# Patient Record
Sex: MALE | Race: WHITE | NOT HISPANIC OR LATINO | Employment: OTHER | URBAN - METROPOLITAN AREA
[De-identification: names, ages, dates, MRNs, and addresses within clinical notes are randomized per-mention and may not be internally consistent; named-entity substitution may affect disease eponyms.]

---

## 2019-03-19 ENCOUNTER — APPOINTMENT (OUTPATIENT)
Dept: GENERAL RADIOLOGY | Facility: CLINIC | Age: 64
DRG: 871 | End: 2019-03-19
Attending: INTERNAL MEDICINE
Payer: COMMERCIAL

## 2019-03-19 ENCOUNTER — APPOINTMENT (OUTPATIENT)
Dept: ULTRASOUND IMAGING | Facility: CLINIC | Age: 64
DRG: 871 | End: 2019-03-19
Attending: EMERGENCY MEDICINE
Payer: COMMERCIAL

## 2019-03-19 ENCOUNTER — APPOINTMENT (OUTPATIENT)
Dept: ULTRASOUND IMAGING | Facility: CLINIC | Age: 64
DRG: 871 | End: 2019-03-19
Attending: INTERNAL MEDICINE
Payer: COMMERCIAL

## 2019-03-19 ENCOUNTER — APPOINTMENT (OUTPATIENT)
Dept: CT IMAGING | Facility: CLINIC | Age: 64
DRG: 871 | End: 2019-03-19
Payer: COMMERCIAL

## 2019-03-19 ENCOUNTER — HOSPITAL ENCOUNTER (INPATIENT)
Facility: CLINIC | Age: 64
LOS: 9 days | Discharge: HOME | DRG: 871 | End: 2019-03-28
Attending: EMERGENCY MEDICINE | Admitting: INTERNAL MEDICINE
Payer: COMMERCIAL

## 2019-03-19 DIAGNOSIS — R52 PAIN: Primary | ICD-10-CM

## 2019-03-19 DIAGNOSIS — N28.9 ACUTE RENAL INSUFFICIENCY: ICD-10-CM

## 2019-03-19 DIAGNOSIS — I48.92 ATRIAL FLUTTER WITH RAPID VENTRICULAR RESPONSE (H): ICD-10-CM

## 2019-03-19 DIAGNOSIS — A41.9 SEPSIS, DUE TO UNSPECIFIED ORGANISM: ICD-10-CM

## 2019-03-19 DIAGNOSIS — R10.13 ABDOMINAL PAIN, EPIGASTRIC: ICD-10-CM

## 2019-03-19 DIAGNOSIS — K21.00 GASTROESOPHAGEAL REFLUX DISEASE WITH ESOPHAGITIS: ICD-10-CM

## 2019-03-19 DIAGNOSIS — R11.2 NAUSEA AND VOMITING, INTRACTABILITY OF VOMITING NOT SPECIFIED, UNSPECIFIED VOMITING TYPE: ICD-10-CM

## 2019-03-19 LAB
ALBUMIN SERPL-MCNC: 3.1 G/DL (ref 3.4–5)
ALP SERPL-CCNC: 73 U/L (ref 40–150)
ALT SERPL W P-5'-P-CCNC: 27 U/L (ref 0–70)
ANION GAP SERPL CALCULATED.3IONS-SCNC: 12 MMOL/L (ref 3–14)
AST SERPL W P-5'-P-CCNC: 18 U/L (ref 0–45)
BASOPHILS # BLD AUTO: 0 10E9/L (ref 0–0.2)
BASOPHILS NFR BLD AUTO: 0 %
BILIRUB SERPL-MCNC: 2.5 MG/DL (ref 0.2–1.3)
BUN SERPL-MCNC: 34 MG/DL (ref 7–30)
CALCIUM SERPL-MCNC: 9.2 MG/DL (ref 8.5–10.1)
CHLORIDE SERPL-SCNC: 102 MMOL/L (ref 94–109)
CO2 BLDCOV-SCNC: 24 MMOL/L (ref 21–28)
CO2 SERPL-SCNC: 23 MMOL/L (ref 20–32)
CREAT SERPL-MCNC: 1.89 MG/DL (ref 0.66–1.25)
D DIMER PPP FEU-MCNC: 1.4 UG/ML FEU (ref 0–0.5)
DIFFERENTIAL METHOD BLD: ABNORMAL
EOSINOPHIL # BLD AUTO: 0 10E9/L (ref 0–0.7)
EOSINOPHIL NFR BLD AUTO: 0 %
ERYTHROCYTE [DISTWIDTH] IN BLOOD BY AUTOMATED COUNT: 13.4 % (ref 10–15)
GFR SERPL CREATININE-BSD FRML MDRD: 36 ML/MIN/{1.73_M2}
GLUCOSE SERPL-MCNC: 123 MG/DL (ref 70–99)
HCT VFR BLD AUTO: 46 % (ref 40–53)
HGB BLD-MCNC: 13.9 G/DL (ref 13.3–17.7)
HGB BLD-MCNC: 14.8 G/DL (ref 13.3–17.7)
HGB BLD-MCNC: 15.8 G/DL (ref 13.3–17.7)
IMM GRANULOCYTES # BLD: 0.2 10E9/L (ref 0–0.4)
IMM GRANULOCYTES NFR BLD: 1.1 %
INTERPRETATION ECG - MUSE: NORMAL
LACTATE BLD-SCNC: 2.6 MMOL/L (ref 0.7–2.1)
LACTATE SERPL-SCNC: 2.2 MMOL/L (ref 0.4–2)
LACTATE SERPL-SCNC: 2.7 MMOL/L (ref 0.4–2)
LIPASE SERPL-CCNC: 332 U/L (ref 73–393)
LYMPHOCYTES # BLD AUTO: 0.5 10E9/L (ref 0.8–5.3)
LYMPHOCYTES NFR BLD AUTO: 2.6 %
MCH RBC QN AUTO: 30.3 PG (ref 26.5–33)
MCHC RBC AUTO-ENTMCNC: 34.3 G/DL (ref 31.5–36.5)
MCV RBC AUTO: 88 FL (ref 78–100)
MONOCYTES # BLD AUTO: 0.7 10E9/L (ref 0–1.3)
MONOCYTES NFR BLD AUTO: 3.4 %
NEUTROPHILS # BLD AUTO: 18.8 10E9/L (ref 1.6–8.3)
NEUTROPHILS NFR BLD AUTO: 92.9 %
NRBC # BLD AUTO: 0 10*3/UL
NRBC BLD AUTO-RTO: 0 /100
PCO2 BLDV: 42 MM HG (ref 40–50)
PH BLDV: 7.36 PH (ref 7.32–7.43)
PLATELET # BLD AUTO: 152 10E9/L (ref 150–450)
PO2 BLDV: 19 MM HG (ref 25–47)
POTASSIUM SERPL-SCNC: 4 MMOL/L (ref 3.4–5.3)
PROCALCITONIN SERPL-MCNC: 23 NG/ML
PROT SERPL-MCNC: 7.9 G/DL (ref 6.8–8.8)
RBC # BLD AUTO: 5.22 10E12/L (ref 4.4–5.9)
SAO2 % BLDV FROM PO2: 27 %
SODIUM SERPL-SCNC: 137 MMOL/L (ref 133–144)
TROPONIN I SERPL-MCNC: <0.015 UG/L (ref 0–0.04)
WBC # BLD AUTO: 20.3 10E9/L (ref 4–11)

## 2019-03-19 PROCEDURE — 87040 BLOOD CULTURE FOR BACTERIA: CPT | Performed by: PHYSICIAN ASSISTANT

## 2019-03-19 PROCEDURE — 83690 ASSAY OF LIPASE: CPT | Performed by: PHYSICIAN ASSISTANT

## 2019-03-19 PROCEDURE — 25000128 H RX IP 250 OP 636: Performed by: PHYSICIAN ASSISTANT

## 2019-03-19 PROCEDURE — 84484 ASSAY OF TROPONIN QUANT: CPT | Performed by: PHYSICIAN ASSISTANT

## 2019-03-19 PROCEDURE — 36415 COLL VENOUS BLD VENIPUNCTURE: CPT | Performed by: INTERNAL MEDICINE

## 2019-03-19 PROCEDURE — C9113 INJ PANTOPRAZOLE SODIUM, VIA: HCPCS | Performed by: INTERNAL MEDICINE

## 2019-03-19 PROCEDURE — 96365 THER/PROPH/DIAG IV INF INIT: CPT | Mod: 59

## 2019-03-19 PROCEDURE — 25800030 ZZH RX IP 258 OP 636: Performed by: PHYSICIAN ASSISTANT

## 2019-03-19 PROCEDURE — 85025 COMPLETE CBC W/AUTO DIFF WBC: CPT | Performed by: PHYSICIAN ASSISTANT

## 2019-03-19 PROCEDURE — 93970 EXTREMITY STUDY: CPT

## 2019-03-19 PROCEDURE — 25000132 ZZH RX MED GY IP 250 OP 250 PS 637: Performed by: EMERGENCY MEDICINE

## 2019-03-19 PROCEDURE — 25800030 ZZH RX IP 258 OP 636: Performed by: INTERNAL MEDICINE

## 2019-03-19 PROCEDURE — 25000128 H RX IP 250 OP 636: Performed by: EMERGENCY MEDICINE

## 2019-03-19 PROCEDURE — 96361 HYDRATE IV INFUSION ADD-ON: CPT

## 2019-03-19 PROCEDURE — 85018 HEMOGLOBIN: CPT | Performed by: PHYSICIAN ASSISTANT

## 2019-03-19 PROCEDURE — 85379 FIBRIN DEGRADATION QUANT: CPT | Performed by: PHYSICIAN ASSISTANT

## 2019-03-19 PROCEDURE — 25000132 ZZH RX MED GY IP 250 OP 250 PS 637: Performed by: PHYSICIAN ASSISTANT

## 2019-03-19 PROCEDURE — 80053 COMPREHEN METABOLIC PANEL: CPT | Performed by: PHYSICIAN ASSISTANT

## 2019-03-19 PROCEDURE — 96375 TX/PRO/DX INJ NEW DRUG ADDON: CPT

## 2019-03-19 PROCEDURE — 99285 EMERGENCY DEPT VISIT HI MDM: CPT | Mod: 25

## 2019-03-19 PROCEDURE — 25000128 H RX IP 250 OP 636: Performed by: FAMILY MEDICINE

## 2019-03-19 PROCEDURE — 82803 BLOOD GASES ANY COMBINATION: CPT

## 2019-03-19 PROCEDURE — 76705 ECHO EXAM OF ABDOMEN: CPT

## 2019-03-19 PROCEDURE — 71046 X-RAY EXAM CHEST 2 VIEWS: CPT

## 2019-03-19 PROCEDURE — 74177 CT ABD & PELVIS W/CONTRAST: CPT

## 2019-03-19 PROCEDURE — 12000000 ZZH R&B MED SURG/OB

## 2019-03-19 PROCEDURE — C9113 INJ PANTOPRAZOLE SODIUM, VIA: HCPCS | Performed by: EMERGENCY MEDICINE

## 2019-03-19 PROCEDURE — 93005 ELECTROCARDIOGRAM TRACING: CPT

## 2019-03-19 PROCEDURE — 25000125 ZZHC RX 250: Performed by: FAMILY MEDICINE

## 2019-03-19 PROCEDURE — 83605 ASSAY OF LACTIC ACID: CPT

## 2019-03-19 PROCEDURE — A9270 NON-COVERED ITEM OR SERVICE: HCPCS | Performed by: EMERGENCY MEDICINE

## 2019-03-19 PROCEDURE — 25000128 H RX IP 250 OP 636: Performed by: INTERNAL MEDICINE

## 2019-03-19 PROCEDURE — 83605 ASSAY OF LACTIC ACID: CPT | Performed by: PHYSICIAN ASSISTANT

## 2019-03-19 PROCEDURE — 84145 PROCALCITONIN (PCT): CPT | Performed by: PHYSICIAN ASSISTANT

## 2019-03-19 PROCEDURE — 99223 1ST HOSP IP/OBS HIGH 75: CPT | Mod: AI | Performed by: INTERNAL MEDICINE

## 2019-03-19 PROCEDURE — 85018 HEMOGLOBIN: CPT | Performed by: INTERNAL MEDICINE

## 2019-03-19 RX ORDER — POTASSIUM CL/LIDO/0.9 % NACL 10MEQ/0.1L
10 INTRAVENOUS SOLUTION, PIGGYBACK (ML) INTRAVENOUS
Status: DISCONTINUED | OUTPATIENT
Start: 2019-03-19 | End: 2019-03-22

## 2019-03-19 RX ORDER — POLYETHYLENE GLYCOL 3350 17 G/17G
17 POWDER, FOR SOLUTION ORAL DAILY PRN
Status: DISCONTINUED | OUTPATIENT
Start: 2019-03-19 | End: 2019-03-28 | Stop reason: HOSPADM

## 2019-03-19 RX ORDER — POTASSIUM CHLORIDE 29.8 MG/ML
20 INJECTION INTRAVENOUS
Status: DISCONTINUED | OUTPATIENT
Start: 2019-03-19 | End: 2019-03-22

## 2019-03-19 RX ORDER — ONDANSETRON 2 MG/ML
4 INJECTION INTRAMUSCULAR; INTRAVENOUS EVERY 6 HOURS PRN
Status: DISCONTINUED | OUTPATIENT
Start: 2019-03-19 | End: 2019-03-28 | Stop reason: HOSPADM

## 2019-03-19 RX ORDER — METOCLOPRAMIDE HYDROCHLORIDE 5 MG/ML
5 INJECTION INTRAMUSCULAR; INTRAVENOUS EVERY 6 HOURS PRN
Status: DISCONTINUED | OUTPATIENT
Start: 2019-03-19 | End: 2019-03-27

## 2019-03-19 RX ORDER — LIDOCAINE 40 MG/G
CREAM TOPICAL
Status: DISCONTINUED | OUTPATIENT
Start: 2019-03-19 | End: 2019-03-19

## 2019-03-19 RX ORDER — PROCHLORPERAZINE MALEATE 5 MG
5 TABLET ORAL EVERY 6 HOURS PRN
Status: DISCONTINUED | OUTPATIENT
Start: 2019-03-19 | End: 2019-03-28 | Stop reason: HOSPADM

## 2019-03-19 RX ORDER — BISACODYL 5 MG
15 TABLET, DELAYED RELEASE (ENTERIC COATED) ORAL DAILY PRN
Status: DISCONTINUED | OUTPATIENT
Start: 2019-03-19 | End: 2019-03-27

## 2019-03-19 RX ORDER — IOPAMIDOL 755 MG/ML
95 INJECTION, SOLUTION INTRAVASCULAR ONCE
Status: COMPLETED | OUTPATIENT
Start: 2019-03-19 | End: 2019-03-19

## 2019-03-19 RX ORDER — ACETAMINOPHEN 650 MG/1
650 SUPPOSITORY RECTAL EVERY 4 HOURS PRN
Status: DISCONTINUED | OUTPATIENT
Start: 2019-03-19 | End: 2019-03-28 | Stop reason: HOSPADM

## 2019-03-19 RX ORDER — POTASSIUM CHLORIDE 1.5 G/1.58G
20-40 POWDER, FOR SOLUTION ORAL
Status: DISCONTINUED | OUTPATIENT
Start: 2019-03-19 | End: 2019-03-22

## 2019-03-19 RX ORDER — POTASSIUM CHLORIDE 7.45 MG/ML
10 INJECTION INTRAVENOUS
Status: DISCONTINUED | OUTPATIENT
Start: 2019-03-19 | End: 2019-03-22

## 2019-03-19 RX ORDER — ONDANSETRON 2 MG/ML
4 INJECTION INTRAMUSCULAR; INTRAVENOUS ONCE
Status: COMPLETED | OUTPATIENT
Start: 2019-03-19 | End: 2019-03-19

## 2019-03-19 RX ORDER — AMOXICILLIN 250 MG
1 CAPSULE ORAL 2 TIMES DAILY
Status: DISCONTINUED | OUTPATIENT
Start: 2019-03-19 | End: 2019-03-20

## 2019-03-19 RX ORDER — ACETAMINOPHEN 325 MG/1
650 TABLET ORAL EVERY 4 HOURS PRN
Status: DISCONTINUED | OUTPATIENT
Start: 2019-03-19 | End: 2019-03-28 | Stop reason: HOSPADM

## 2019-03-19 RX ORDER — HYDROMORPHONE HYDROCHLORIDE 1 MG/ML
.3-.5 INJECTION, SOLUTION INTRAMUSCULAR; INTRAVENOUS; SUBCUTANEOUS
Status: DISCONTINUED | OUTPATIENT
Start: 2019-03-19 | End: 2019-03-23

## 2019-03-19 RX ORDER — METOCLOPRAMIDE 5 MG/1
5 TABLET ORAL EVERY 6 HOURS PRN
Status: DISCONTINUED | OUTPATIENT
Start: 2019-03-19 | End: 2019-03-27

## 2019-03-19 RX ORDER — NITROGLYCERIN 0.4 MG/1
0.4 TABLET SUBLINGUAL EVERY 5 MIN PRN
Status: DISCONTINUED | OUTPATIENT
Start: 2019-03-19 | End: 2019-03-28 | Stop reason: HOSPADM

## 2019-03-19 RX ORDER — ACETAMINOPHEN 500 MG
1000 TABLET ORAL ONCE
Status: COMPLETED | OUTPATIENT
Start: 2019-03-19 | End: 2019-03-19

## 2019-03-19 RX ORDER — PROCHLORPERAZINE 25 MG
12.5 SUPPOSITORY, RECTAL RECTAL EVERY 12 HOURS PRN
Status: DISCONTINUED | OUTPATIENT
Start: 2019-03-19 | End: 2019-03-28 | Stop reason: HOSPADM

## 2019-03-19 RX ORDER — PSEUDOEPHEDRINE HCL 30 MG
TABLET ORAL PRN
Status: ON HOLD | COMMUNITY
End: 2019-03-28

## 2019-03-19 RX ORDER — MAGNESIUM SULFATE HEPTAHYDRATE 40 MG/ML
4 INJECTION, SOLUTION INTRAVENOUS EVERY 4 HOURS PRN
Status: DISCONTINUED | OUTPATIENT
Start: 2019-03-19 | End: 2019-03-22

## 2019-03-19 RX ORDER — BISACODYL 5 MG
10 TABLET, DELAYED RELEASE (ENTERIC COATED) ORAL DAILY PRN
Status: DISCONTINUED | OUTPATIENT
Start: 2019-03-19 | End: 2019-03-27

## 2019-03-19 RX ORDER — LIDOCAINE 40 MG/G
CREAM TOPICAL
Status: DISCONTINUED | OUTPATIENT
Start: 2019-03-19 | End: 2019-03-28 | Stop reason: HOSPADM

## 2019-03-19 RX ORDER — OXYCODONE HYDROCHLORIDE 5 MG/1
5-10 TABLET ORAL
Status: DISCONTINUED | OUTPATIENT
Start: 2019-03-19 | End: 2019-03-28 | Stop reason: HOSPADM

## 2019-03-19 RX ORDER — POTASSIUM CHLORIDE 1500 MG/1
20-40 TABLET, EXTENDED RELEASE ORAL
Status: DISCONTINUED | OUTPATIENT
Start: 2019-03-19 | End: 2019-03-22

## 2019-03-19 RX ORDER — AMPICILLIN AND SULBACTAM 2; 1 G/1; G/1
3 INJECTION, POWDER, FOR SOLUTION INTRAMUSCULAR; INTRAVENOUS ONCE
Status: COMPLETED | OUTPATIENT
Start: 2019-03-19 | End: 2019-03-19

## 2019-03-19 RX ORDER — AMPICILLIN AND SULBACTAM 2; 1 G/1; G/1
3 INJECTION, POWDER, FOR SOLUTION INTRAMUSCULAR; INTRAVENOUS EVERY 6 HOURS
Status: DISCONTINUED | OUTPATIENT
Start: 2019-03-19 | End: 2019-03-19

## 2019-03-19 RX ORDER — BISACODYL 5 MG
5 TABLET, DELAYED RELEASE (ENTERIC COATED) ORAL DAILY PRN
Status: DISCONTINUED | OUTPATIENT
Start: 2019-03-19 | End: 2019-03-27

## 2019-03-19 RX ORDER — SODIUM CHLORIDE 9 MG/ML
INJECTION, SOLUTION INTRAVENOUS CONTINUOUS
Status: DISCONTINUED | OUTPATIENT
Start: 2019-03-19 | End: 2019-03-24

## 2019-03-19 RX ORDER — IBUPROFEN 200 MG
TABLET ORAL PRN
Status: ON HOLD | COMMUNITY
End: 2019-03-28

## 2019-03-19 RX ORDER — PIPERACILLIN SODIUM, TAZOBACTAM SODIUM 3; .375 G/15ML; G/15ML
3.38 INJECTION, POWDER, LYOPHILIZED, FOR SOLUTION INTRAVENOUS ONCE
Status: DISCONTINUED | OUTPATIENT
Start: 2019-03-19 | End: 2019-03-19

## 2019-03-19 RX ORDER — OXYMETAZOLINE HYDROCHLORIDE 0.05 G/100ML
SPRAY NASAL PRN
Status: ON HOLD | COMMUNITY
End: 2019-03-28

## 2019-03-19 RX ORDER — NALOXONE HYDROCHLORIDE 0.4 MG/ML
.1-.4 INJECTION, SOLUTION INTRAMUSCULAR; INTRAVENOUS; SUBCUTANEOUS
Status: DISCONTINUED | OUTPATIENT
Start: 2019-03-19 | End: 2019-03-28 | Stop reason: HOSPADM

## 2019-03-19 RX ORDER — AMOXICILLIN 250 MG
2 CAPSULE ORAL 2 TIMES DAILY
Status: DISCONTINUED | OUTPATIENT
Start: 2019-03-19 | End: 2019-03-20

## 2019-03-19 RX ORDER — ONDANSETRON 4 MG/1
4 TABLET, ORALLY DISINTEGRATING ORAL EVERY 6 HOURS PRN
Status: DISCONTINUED | OUTPATIENT
Start: 2019-03-19 | End: 2019-03-28 | Stop reason: HOSPADM

## 2019-03-19 RX ORDER — HYDROMORPHONE HYDROCHLORIDE 1 MG/ML
0.5 INJECTION, SOLUTION INTRAMUSCULAR; INTRAVENOUS; SUBCUTANEOUS ONCE
Status: COMPLETED | OUTPATIENT
Start: 2019-03-19 | End: 2019-03-19

## 2019-03-19 RX ORDER — AMPICILLIN AND SULBACTAM 2; 1 G/1; G/1
3 INJECTION, POWDER, FOR SOLUTION INTRAMUSCULAR; INTRAVENOUS EVERY 6 HOURS
Status: DISCONTINUED | OUTPATIENT
Start: 2019-03-19 | End: 2019-03-20

## 2019-03-19 RX ADMIN — Medication 0.5 MG: at 14:56

## 2019-03-19 RX ADMIN — AMPICILLIN SODIUM AND SULBACTAM SODIUM 3 G: 2; 1 INJECTION, POWDER, FOR SOLUTION INTRAMUSCULAR; INTRAVENOUS at 16:29

## 2019-03-19 RX ADMIN — ONDANSETRON 4 MG: 2 INJECTION INTRAMUSCULAR; INTRAVENOUS at 14:56

## 2019-03-19 RX ADMIN — ACETAMINOPHEN 1000 MG: 500 TABLET, FILM COATED ORAL at 15:18

## 2019-03-19 RX ADMIN — SODIUM CHLORIDE: 9 INJECTION, SOLUTION INTRAVENOUS at 18:55

## 2019-03-19 RX ADMIN — SODIUM CHLORIDE 1000 ML: 9 INJECTION, SOLUTION INTRAVENOUS at 17:08

## 2019-03-19 RX ADMIN — SODIUM CHLORIDE 1000 ML: 9 INJECTION, SOLUTION INTRAVENOUS at 14:51

## 2019-03-19 RX ADMIN — SODIUM CHLORIDE 69 ML: 9 INJECTION, SOLUTION INTRAVENOUS at 15:33

## 2019-03-19 RX ADMIN — PANTOPRAZOLE SODIUM 80 MG: 40 INJECTION, POWDER, FOR SOLUTION INTRAVENOUS at 16:18

## 2019-03-19 RX ADMIN — AMPICILLIN SODIUM AND SULBACTAM SODIUM 3 G: 2; 1 INJECTION, POWDER, FOR SOLUTION INTRAMUSCULAR; INTRAVENOUS at 21:37

## 2019-03-19 RX ADMIN — PANTOPRAZOLE SODIUM 40 MG: 40 INJECTION, POWDER, FOR SOLUTION INTRAVENOUS at 21:37

## 2019-03-19 RX ADMIN — SODIUM CHLORIDE, POTASSIUM CHLORIDE, SODIUM LACTATE AND CALCIUM CHLORIDE 1000 ML: 600; 310; 30; 20 INJECTION, SOLUTION INTRAVENOUS at 15:42

## 2019-03-19 RX ADMIN — IOPAMIDOL 95 ML: 755 INJECTION, SOLUTION INTRAVENOUS at 15:33

## 2019-03-19 RX ADMIN — HYDROMORPHONE HYDROCHLORIDE 0.3 MG: 1 INJECTION, SOLUTION INTRAMUSCULAR; INTRAVENOUS; SUBCUTANEOUS at 21:47

## 2019-03-19 ASSESSMENT — ENCOUNTER SYMPTOMS
DIARRHEA: 0
ABDOMINAL DISTENTION: 1
FEVER: 1
DYSURIA: 0
VOMITING: 1
BACK PAIN: 1
COUGH: 0
NAUSEA: 1
ABDOMINAL PAIN: 1

## 2019-03-19 ASSESSMENT — ACTIVITIES OF DAILY LIVING (ADL): ADLS_ACUITY_SCORE: 11

## 2019-03-19 ASSESSMENT — MIFFLIN-ST. JEOR: SCORE: 1637.21

## 2019-03-19 NOTE — H&P
[facility]    History and Physical - Hospitalist Service       Date of Admission:  3/19/2019    Assessment & Plan   Sanjeev Blackmon is a previously healthy 63 year old male admitted on 3/19/2019 after presenting with 1 day of dry heaves with epigastrium pain, and fevers to 102. On presentation he is tachycardic with SBP in the low 90's. Oxygenation 90-91% on RA. Labs are significant for BUN/Cr 34/1.89 (baseline Cr 1.05), bili 2.5, liver enzymes o/w nl, lipase 332. Lactic acid 2.7, procal 23, WBC 20.3, hgb 15.8. D-dimer ordered by RN in triage due to pain in the abdomen with deep breaths was up at 1.4. Otherwise, no chest pain, SOB, leg pain or risk factors. Patient denies any hematemesis, black or bloody stools. Just took ibuprofen 2 tabs x 3 in the last 2 days. No recent EtOH. CT abdomen/pelvis shows inflammatory changes in upper abdomen retroperitoneum around the gastric antrum, suggestive of pancreatitis vs gastritis/gastric ulcer. RUQ U/S shows sludge within the gallbladder with mild wall thickening measuring 0.3 cm, possible cholecystitis. No biliary dilation.                                                                    Severe sepsis with hypotension, elevated lactic acid  Suspect acute cholecystitis -   Gastric inflammation ? Ulcer   BRANDEN (Cr 1.89 on admission, baseline 1.05) secondary to  hypovolemia In the context of speiss.   - SBP remain in the 90's post 3 L IVF in the ED.   - Admit to [facility] for close monitoring  - Unasyn started in the ED.   - Follow hgb q 4 hours x 3  - Follow up lactic acid, BMP.   - Protonix 40 mg IV BID  - GI consulted for probable EGD tomorrow.   - General surgery for possible eventual cholecystectomy   - Continue IVF at 200 ml/hr following strict UOP. NOTIFY MD IF UOP under 120 ml / 3 hours. Will place falk if continues to be hypotensive.   - BCx x 2.     Mild hypoxia at 90% on RA - secondary to atelectasis seen on CT from upper abdominal pain with deep  breaths  Elevated D-dimer - This was ordered without symptoms suggestive of PE.   H/o superficial vein thrombosis of R leg - S/p vein surgery, otherwise no personal or family history of DVT/PE. No recent immobilization, surgery, cancer.   - CXR PA and lateral ordered  - Incentive Spirometry, discussed with patient   - bilateral U/S of legs given history, but with BRANDEN and low suspicion, I am not pursuing further diagnostics for PE.        Diet: NPO with ice chips and meds.   DVT Prophylaxis: Pneumatic Compression Devices  Jordan Catheter: not present  Code Status: Full code confirmed.     Disposition Plan   Expected discharge: 2 - 3 days, recommended to prior living arrangement once once sepsis is resolved, GI and surgery have evaluated, possible EGD and cholecystectomy. .  Entered: Kamryn Alamo MD 03/19/2019, 4:34 PM     The patient's care was discussed with the Patient, Patient's Family and ER Team.    Kamryn Alamo MD  [facility]    ______________________________________________________________________    Chief Complaint   Dry heaves, fevers, abdominal pain.     History is obtained from the patient    History of Present Illness   Sanjeev Blackmon is a previously healthy 63 year old male admitted on 3/19/2019 after presenting with 1 day of dry heaves with epigastrium pain, and fevers to 102. On presentation he is tachycardic with SBP in the low 90's. Oxygenation 90-91% on RA. Labs are significant for BUN/Cr 34/1.89 (baseline Cr 1.05), bili 2.5, liver enzymes o/w nl, lipase 332. Lactic acid 2.7, procal 23, WBC 20.3, hgb 15.8. D-dimer ordered by RN in triage due to pain in the abdomen with deep breaths was up at 1.4.     On direct questioning, patient states [facility].  About 2-1/2 hours following this he developed acute onset of nausea with vomiting.  He states the vomiting was basically just dry heaves.  He persisted with dry heaves all night.  And  start develops severe epigastric pain.  He therefore presented to the emergency department.  He denies any coffee grounds or blood in the vomit.  He had no black or bloody stools.  He states his last bowel movement was yesterday and was normal.  He has taken ibuprofen 2 tablets x3 with this acute illness.  He was having some fevers at home up to 102.  No chills.  Denies any lightheadedness.  He denies any urinary symptoms.  He specifically denies any chest pain he did report to the triage nurse that he had upper abdominal pain with deep breath.  Otherwise no pain in his chest no shortness of breath.  No recent lower extremity pain or swelling.  No known recent sick contacts.     IMPRESSION:  1. There are inflammatory changes in the upper abdomen and  retroperitoneum which abut the gastric antrum and the pancreas.  Possible etiologies include gastritis/gastric ulcer disease as well as  acute pancreatitis. No focal abscess or pseudocyst.  2. There are colonic diverticula without acute diverticulitis. No  bowel obstruction.  3. Left lung base consolidation may be pneumonia. There is dependent  atelectasis bilaterally.    U/S:   IMPRESSION: There is sludge within the gallbladder and there is mild  gallbladder wall thickening measuring 0.3 cm in diameter. This could  represent cholecystitis No biliary dilatation.    Review of Systems    The 10 point Review of Systems is negative other than noted in the HPI.     Past Medical History    I have reviewed this patient's medical history and updated it with pertinent information if needed.   Past Medical History:   Diagnosis Date     Nasal polyps 12/17/2012   History of superficial vein thrombosis. -Patient states that he was going to have vein surgery and then developed thrombosis of that vein he never required anticoagulation for this.      Past Surgical History   I have reviewed this patient's surgical history and updated it with pertinent information if needed.  Past  Surgical History:   Procedure Laterality Date     ABLATE VEIN VARICOSE RADIO FREQUENCY WITH PHLEBECTOMY MULTIPLE STAB  2011    Dr. Do     Scleral lesion excision  2006     SINUS SURGERY  1979     SINUS SURGERY  1989     SINUS SURGERY  1999     SINUS SURGERY  2010    Dr. Samson       Social History   I have reviewed this patient's social history and updated it with pertinent information if needed.  Social History     Tobacco Use     Smoking status: Never Smoker     Smokeless tobacco: Never Used   Substance Use Topics     Alcohol use: Yes     Alcohol/week: 2.0 - 3.0 oz     Types: 4 - 6 Standard drinks or equivalent per week     Drug use: Not on file   He confirms that he has never smoked and drinks about 5 drinks per week.  His last drink was on Saturday.  He lives at home independently with his wife.    Family History   I have reviewed this patient's family history and updated it with pertinent information if needed.     Family History   Problem Relation Age of Onset     Coronary Artery Disease Father    His daughter and mother both had gallbladder disease.    Prior to Admission Medications   Prior to Admission Medications   Prescriptions Last Dose Informant Patient Reported? Taking?   NO ACTIVE MEDICATIONS   Yes No      Facility-Administered Medications: None     Allergies   No Known Allergies    Physical Exam   Vital Signs: Temp: 99.6  F (37.6  C) Temp src: Oral BP: 100/73 Pulse: 103 Heart Rate: 120 Resp: 22 SpO2: 94 % O2 Device: Nasal cannula Oxygen Delivery: 2 LPM  Weight: 190 lbs 0 oz    Constitutional:   awake, alert, cooperative, no apparent distress, and appears stated age, appears tired.      Eyes:   Lids and lashes normal, pupils equal, round and reactive to light, extra ocular muscles intact, sclera clear, conjunctiva normal     ENT:   Normocephalic, without obvious abnormality, atramatic, oral pharynx with moist mucus membranes, tonsils without erythema or exudates .     Neck:   Supple, symmetrical,  trachea midline, no adenopathy, thyroid symmetric, not enlarged and no tenderness, skin normal     Lungs:   No increased work of breathing, good air exchange, clear to auscultation bilaterally, no crackles or wheezing     Cardiovascular:   Regular rhythm, rate around 100, and no murmur noted. Extremities are warm. No edema.      Abdomen:   Normal bowel sounds, soft, non-distended, TTP in the mid abdomen, not RUQ, no guarding or rebound, no masses palpated, no hepatosplenomegally     Musculoskeletal:   There is no redness, warmth, or swelling of the joints. Normal build.      Neurologic:   Awake, alert, oriented to name, place and time.  Cranial nerves II-XII are grossly intact.  Moving a 4 extremities without gross focal weakness.     Neuropsychiatric:   General: normal, calm and normal eye contact.      Skin:   no bruising or bleeding, no redness, warmth, or swelling and no rashes.        Data   Data reviewed today: I reviewed all medications, new labs and imaging results over the last 24 hours. I personally reviewed no images or EKG's today. Will personally review CXR    Recent Labs   Lab 03/19/19  1745 03/19/19  1450   WBC  --  20.3*   HGB 13.9 15.8   MCV  --  88   PLT  --  152   NA  --  137   POTASSIUM  --  4.0   CHLORIDE  --  102   CO2  --  23   BUN  --  34*   CR  --  1.89*   ANIONGAP  --  12   LEANNA  --  9.2   GLC  --  123*   ALBUMIN  --  3.1*   PROTTOTAL  --  7.9   BILITOTAL  --  2.5*   ALKPHOS  --  73   ALT  --  27   AST  --  18   LIPASE  --  332   TROPI  --  <0.015     Recent Results (from the past 24 hour(s))   CT Abdomen Pelvis w Contrast    Narrative    CT ABDOMEN AND PELVIS WITH CONTRAST   3/19/2019 3:34 PM     HISTORY:  Abdominal distension. Tenderness with vomiting and fevers.    TECHNIQUE:  CT abdomen and pelvis with 95 mL Isovue-370 IV. Radiation  dose for this scan was reduced using automated exposure control,  adjustment of the mA and/or kV according to patient size, or iterative  reconstruction  technique.    COMPARISON: None.    FINDINGS:  Abdomen: There is atelectasis at the lung bases. Consolidation in the  left posterior costophrenic angle may be pneumonia. The heart size is  normal. The liver, spleen and gallbladder are normal in appearance.  There is fluid stranding in the fat of the upper abdomen abutting the  gastric antrum as well as the pancreas. There is fluid stranding  extending down the retroperitoneum bilaterally, left greater than  right. No focal fluid collection to suggest pseudocyst or abscess. No  evidence of perforation. The pancreas otherwise appears normal. The  adrenal glands are normal in appearance. There are a few renal  cortical cysts bilaterally. No hydronephrosis. There is no abdominal  or pelvic lymph node enlargement. There is a retroaortic left renal  vein. There is atherosclerotic calcification of the aorta and its  branches. No aneurysm.    Pelvis: There are colonic diverticula without acute diverticulitis.  There is no bowel obstruction or inflammation. No free intraperitoneal  gas or fluid.      Impression    IMPRESSION:  1. There are inflammatory changes in the upper abdomen and  retroperitoneum which abut the gastric antrum and the pancreas.  Possible etiologies include gastritis/gastric ulcer disease as well as  acute pancreatitis. No focal abscess or pseudocyst.  2. There are colonic diverticula without acute diverticulitis. No  bowel obstruction.  3. Left lung base consolidation may be pneumonia. There is dependent  atelectasis bilaterally.    YAAKOV RAZO MD   Abdomen US, limited (RUQ only)    Narrative    RIGHT UPPER QUADRANT ULTRASOUND 3/19/2019 4:57 PM    HISTORY:  Right upper quadrant pain.    COMPARISON: CT abdomen and pelvis from today.    FINDINGS:    Gallbladder: No echogenic gallstones. There does appear to be sludge  within the gallbladder. Gallbladder wall is mildly thickened measuring  up to 0.3 cm.    Bile ducts: Common hepatic duct is within normal  limits at 0.2 cm.    Liver: Normal.     Pancreas: Obscured by gas    Right kidney: There are 2 cysts, the largest measuring 4.0 cm in  diameter in the lower pole.      Impression    IMPRESSION: There is sludge within the gallbladder and there is mild  gallbladder wall thickening measuring 0.3 cm in diameter. This could  represent cholecystitis No biliary dilatation.    KELLEY TORRES MD   Chest XR,  PA & LAT    Narrative    XR CHEST 2 VW 3/19/2019 5:58 PM    COMPARISON: None.    HISTORY: Shortness of breath and hypoxia.      Impression    IMPRESSION: Mild bibasilar atelectasis/consolidation. No pleural  effusion or pneumothorax seen on either side.

## 2019-03-19 NOTE — ED NOTES
"[facility]  ED Nurse Handoff Report    ED Chief complaint: Nausea & Vomiting (x1 today. Started Monday. Has not seen PCP for this. ) and Abdominal Pain (Since Monday. )      ED Diagnosis:   Final diagnoses:   Nausea and vomiting, intractability of vomiting not specified, unspecified vomiting type   Acute renal insufficiency   Sepsis, due to unspecified organism (H)   Abdominal pain, epigastric       Code Status: Full Code    Allergies: No Known Allergies    Activity level - Baseline/Home:  Independent    Activity Level - Current:   Independent     Needed?: No    Isolation: No  Infection: Not Applicable  Bariatric?: No    Vital Signs:   Vitals:    03/19/19 1605 03/19/19 1610 03/19/19 1611 03/19/19 1631   BP: 106/73 106/73     Pulse: 117      Resp:  18 22    Temp:    99.6  F (37.6  C)   TempSrc:    Oral   SpO2: 93% 94% 94%    Weight:       Height:           Cardiac Rhythm: ,        Pain level: 0-10 Pain Scale: 4    Is this patient confused?: No   Does this patient have a guardian?  No         If yes, is there guardianship documents in the Epic \"Code/ACP\" activity?  N/A         Guardian Notified?  N/A  [place] - Suicide Severity Rating Scale Completed?  Yes  If yes, what color did the patient score?  White    Patient Report: Initial Complaint: Abd pain, fever   Focused Assessment: Pt presents from home with wife with abd pain since yesterday, with one episode of vomiting yesterday and dry heaving last night into today. Pt does have a hx of drinking alcohol and CT showing possible pancreatitis vs gastritis. Pt also complaining of chest discomfort and inability to take a full breath- CT showing possible PNA as well. Here, pt febrile at 101 and tachycardic. Pt given 1g tylenol, 2 liter IVF, 80 mg protonix and unasyn. Pt reports pain improved after IV dilaudid and zofran. Blood pressures ranging from  systolic, o2 only 90% on RA so pt placed on 3 liters NC. Pt reports feeling much " improved. Wife at bedside. Pt being sent for US at this time- this resulted showing possible cholecystitis.   Tests Performed: Blood work, CT, US, ekg  Abnormal Results: Pancreatitis vs gastritis, possible PNA, lactic 2.6, wbc 20, BRANDEN  Treatments provided: See above and MAR    Family Comments: Wife at bedside    OBS brochure/video discussed/provided to patient/family: Yes              Name of person given brochure if not patient: n/a              Relationship to patient: n/a    ED Medications:   Medications   lidocaine 1 % 1 mL (not administered)   lidocaine (LMX4) cream (not administered)   sodium chloride (PF) 0.9% PF flush 3 mL (not administered)   sodium chloride (PF) 0.9% PF flush 3 mL (not administered)   0.9% sodium chloride BOLUS (not administered)   ampicillin-sulbactam (UNASYN) 3 g vial to attach to  mL bag (3 g Intravenous New Bag 3/19/19 1629)   0.9% sodium chloride BOLUS (0 mLs Intravenous Stopped 3/19/19 1541)   ondansetron (ZOFRAN) injection 4 mg (4 mg Intravenous Given 3/19/19 1456)   HYDROmorphone (PF) (DILAUDID) injection 0.5 mg (0.5 mg Intravenous Given 3/19/19 1456)   iopamidol (ISOVUE-370) solution 95 mL (95 mLs Intravenous Given 3/19/19 1533)   Saline (69 mLs As instructed Given 3/19/19 1533)   acetaminophen (TYLENOL) tablet 1,000 mg (1,000 mg Oral Given 3/19/19 1518)   lactated ringers BOLUS 1,000 mL (1,000 mLs Intravenous New Bag 3/19/19 1542)   pantoprazole (PROTONIX) 40 mg IV push injection (80 mg Intravenous Given 3/19/19 1618)       Drips infusing?:  Yes    For the majority of the shift this patient was Green.   Interventions performed were meds updates.    Severe Sepsis OR Septic Shock Diagnosis Present: No    To be done/followed up on inpatient unit:  inpt orders    ED NURSE PHONE NUMBER: *[phone]

## 2019-03-19 NOTE — ED PROVIDER NOTES
History     Chief Complaint:  Abdominal Pain and Vomiting     The history is provided by the patient.      Sanjeev Blackmon is a 63 year old male who presents with two days of abdominal pain as well as nausea and vomiting. He states he has been predominately dry heaving since a significant episode of emesis after breakfast with his mother yesterday. He reports eating a jalapeno bagel at [facility]. He reports most recent episode of emesis was 13 hours ago. Patient's spouse note trouble breathing and he asserts he cannot take a full breath due to his abdominal pain. He denies history of similar symptoms. Currently, he complains of diffuse abdominal pain and distention as well as low back pain. In addition, he reports an episode of fever at 102F yesterday and asserts his pain started shortly after having breakfast yesterday morning. He denies any cough, chest pain, diarrhea, dysuria, penile/scotal pain, or personal history of myocardial infarction. He states his last ibuprofen dose was six hours ago and states his last bowel movement was yesterday.     Allergies:  No Known Drug Allergies     Medications:    Medications reviewed. No current medications.     Past Medical History:    Nasal polyps    Past Surgical History:    Scleral lesion excision    Family History:    Father: CAD    Social History:  The patient was accompanied to the ED by wife.  Smoking Status: Never Smoker  Smokeless Tobacco: Never Used  Alcohol Use: Positive  Marital Status:      Review of Systems   Constitutional: Positive for fever.   Respiratory: Negative for cough.    Cardiovascular: Negative for chest pain.   Gastrointestinal: Positive for abdominal distention, abdominal pain, nausea and vomiting. Negative for diarrhea.   Genitourinary: Negative for dysuria and penile pain.   Musculoskeletal: Positive for back pain.   All other systems reviewed and are negative.    Physical Exam     Patient Vitals for the past 24 hrs:   BP Temp Temp  "src Pulse Heart Rate Resp SpO2 Height Weight   03/19/19 1800 100/73 -- -- -- 100 -- 92 % -- --   03/19/19 1759 -- -- -- -- 103 23 91 % -- --   03/19/19 1758 93/58 -- -- -- -- -- -- -- --   03/19/19 1735 -- -- -- -- 105 16 92 % -- --   03/19/19 1730 90/70 -- -- 103 101 20 -- -- --   03/19/19 1705 (!) 89/64 -- -- 104 103 21 -- -- --   03/19/19 1631 -- 99.6  F (37.6  C) Oral -- -- -- -- -- --   03/19/19 1630 98/69 -- -- 111 112 19 94 % -- --   03/19/19 1611 -- -- -- -- 120 22 94 % -- --   03/19/19 1610 106/73 -- -- -- 118 18 94 % -- --   03/19/19 1605 106/73 -- -- 117 120 -- 93 % -- --   03/19/19 1557 -- -- -- -- 115 23 94 % -- --   03/19/19 1545 -- -- -- -- -- -- 90 % -- --   03/19/19 1544 -- -- -- 119 -- -- 91 % -- --   03/19/19 1540 95/69 -- -- -- -- -- -- -- --   03/19/19 1511 -- 101  F (38.3  C) Oral -- -- -- -- -- --   03/19/19 1508 109/73 -- -- 118 -- -- 93 % -- --   03/19/19 1423 111/77 98.5  F (36.9  C) Oral 118 -- 18 95 % 1.753 m (5' 9\") 86.2 kg (190 lb)     Physical Exam  General: Alert and interactive. Appears uncomfortable. Cooperative and pleasant.   Eyes: The pupils are equal and round. EOMs intact. No scleral icterus.  ENT: No abnormalities to the external nose or ears. Mucous membranes moist. Posterior oropharynx is non-erythematous.      Neck: Trachea is in the midline. No nuchal rigidity.     CV: Rate as above. Regular rhythm. S1 and S2 normal without murmur, click, gallop or rub. Patient unable to take a complete breath due to discomfort.   Resp: Breath sounds are clear bilaterally, without rhonchi, wheezes, rales. Non-labored, no retractions or accessory muscle use.     GI: No peritoneal signs. Abdomen is slightly distended. Tenderness and guarding in the upper abdomen. No specific area of maximal tenderness to palpation.     MS: Moving all extremities well. Good muscle tone.   Skin: Warm and dry. No rash or lesions noted.  Neuro: Alert and oriented x 3. No focal neurologic deficits. Good strength " and sensation in upper and lower extremities.    Psych: Awake. Alert.  Normal affect. Appropriate interactions.  Lymph: No anterior or posterior cervical lymphadenopathy noted.    Emergency Department Course     ECG:  ECG taken at 1511, ECG read at 1522  Sinus tachycardia  Otherwise normal ECG  Rate 115 bpm. OH interval 140 ms. QRS duration 90 ms. QT/QTc 336/464 ms. P-R-T axes 49 62 51.    Imaging:  Radiology findings were communicated with the patient who voiced understanding of the findings.    CT Abdomen Pelvis w Contrast  1. There are inflammatory changes in the upper abdomen and  retroperitoneum which abut the gastric antrum and the pancreas.  Possible etiologies include gastritis/gastric ulcer disease as well as  acute pancreatitis. No focal abscess or pseudocyst.  2. There are colonic diverticula without acute diverticulitis. No  bowel obstruction.  3. Left lung base consolidation may be pneumonia. There is dependent  atelectasis bilaterally.  Reading per radiology    Abdomen US, limited (RUQ only)  There is sludge within the gallbladder and there is mild  gallbladder wall thickening measuring 0.3 cm in diameter. This could  represent cholecystitis No biliary dilatation.  Reading per radiology    Laboratory:  Laboratory findings were communicated with the patient who voiced understanding of the findings.    Hemoglobin: 13.9  ISTAT gases: 7.36/42/19(L)/24; Lactic Acid (Resulted: 1527): 2.6 (H)  CBC: WBC 20.3 (H), HGB 15.8,   CMP: glucose 123  (H), BUN 34 (H ), creatinine 1.89 (H), GFR 36 (L), bilirubin 2.5 (H), albumin 3.1 (L) o/w WNL  Lipase: 332  Troponin (Collected 1450): <0.015  D dimer: 1.4 (H)  Procalcitonin: 23.00 (HH)  Blood culture x2: Pending  Lactic Acid (Resulted: 1546): 2.7 (H)  Lactic Acid (Resulted: 1802): 2.2 (H)    Interventions:  1451: NS 1L IV Bolus   1456: Zofran 4 mg IV  1456: Dilaudid 0.5 mg IV  1518: Tylenol 1000 mg PO  1542: LR 1L IV Infusion  1618: Protonix 80 mg IV  Infusion  1629: Unasyn 3 g in  mL IV    Emergency Department Course:  1415 Nursing notes and vitals reviewed.    1445 I performed an exam of the patient as documented above.     1450 IV was inserted and blood was drawn for laboratory testing, results above.    1510 The patient was sent for a CT while in the emergency department, results above.     1511 EKG obtained in the ED, see results above.     1550 I staffed the patient with Juan Nieto DO. Please refer to his note for further details.    1613 I spoke with Dr. Alamo of the hospitalist service regarding patient's presentation, findings, and plan of care.    1619 Patient rechecked and updated.     1638 The patient was sent for an ultrasound while in the emergency department, results above.     I personally reviewed the imaging and laboratory results with the patient and answered all related questions prior to admission.     Impression & Plan      CMS Diagnoses:   The patient has signs of Severe Sepsis as evidenced by:    1. 2 SIRS criteria, AND  2. Suspected infection, AND   3. Organ dysfunction: Lactic Acid > 1.9    Time severe sepsis diagnosis confirmed = 1520 as this was the time when Lactate resulted, and the level was > 1.9      3 Hour Severe Sepsis Bundle Completion:  1. Initial Lactic Acid Result:   Recent Labs   Lab Test 03/19/19  1745 03/19/19  1523 03/19/19  1520   LACT 2.2* 2.6* 2.7*     2. Blood Cultures before Antibiotics: Yes  3. Broad Spectrum Antibiotics Administered: Yes  Unasyn 3g IV    4. 3000 ml of IV fluids.  Ideal body weight: 70.7 kg (155 lb 13.8 oz)  Adjusted ideal body weight: 76.9 kg (169 lb 8.3 oz)    Severe Sepsis reassessment:  1. Repeat Lactic Acid Level: 2.2  2. MAP>65 after initial IVF bolus, will continue to monitor fluid status and vital signs  I attest to having performed a repeat sepsis exam and assessment of perfusion at 1800 and the results demonstrate improved perfusion.    Medical Decision Making:  Sanjeev ANTOINE  Lorri is a 63 year old male who presents to the emergency department today for evaluation of nausea, vomiting, and abdominal pain for the past 48 hours. This apparently began after the patient ate a spicy sandwich for lunch with his mother yesterday. Laboratory analysis obtained here did show significant leukocytosis of 20.3 with a left shift, and as slight bump in the Creatinine at 1.89 (up from baseline at 1.05). Given initial vitals with tachycardia and fever, the patient met sepsis criteria. Lactate was ordered, which was elevated at 2.7. CT abdomen pelvis shows signs of inflammation surrounding the gastric antrum and pancreas. Given normal lipase, unlikely related to be acute pancreatitis, although patient does endorse alcohol use. No signs of bowel obstruction. Patient was given IV Protonix in case this is related to gastritis. Abdominal US obtained, which does show some signs of possible cholecystitis. Procalcitonin is elevated, making this more likely to be a bacterial infection, as opposed to viral gastroenteritis. Blood cultures are pending. Patient given Unasyn for possibly cholecystitis. Repeat lactate is improved at 2.2.     Patient given three liter of fluids and maintained soft blood pressures here in the Emergency Department. Upon arrival, troponin and dimer obtained given his shortness of breath. Troponin and EKG show no signs of ischemia or infarction, but dimer was elevated at 1.4. I considered PE as an etiology, although I don't think this is likely without hypoxia and no other risk factors. Given elevated renal function, felt as though I should hold off on CTA at this time. Patient understands that we cannot completely rule out PE at this time. WIll continue to monitor vitals, and CXR and ultrasounds can be ordered as an inpatient. CT and CXR shows infiltrates versus atelectasis, but I do not believe sepsis is do to pneumonia without any cough.     Staffed this patient with Dr. Nieto and  then discussed the patient with Dr. Alamo, who will admit to an inpatient bed for further IV antibiotics, GI and/or surgery consult, and further monitoring and treatment. Patient stable in Emergency Department prior to admission to floor.     Diagnosis:    ICD-10-CM   1. Nausea and vomiting, intractability of vomiting not specified, unspecified vomiting type R11.2   2. Acute renal insufficiency N28.9   3. Sepsis, due to unspecified organism (H) A41.9   4. Abdominal pain, epigastric R10.13     Disposition:   The patient is admitted into the hospitalist service under care of Dr. Alamo.    Scribe Disclosure:  Siddhartha AYALA, am serving as a scribe at 3:11 PM on 3/19/2019 to document services personally performed by Isabelle Rubio PA-C based on my observations and the provider's statements to me.     EMERGENCY DEPARTMENT       Isabelle Rubio PA-C  03/19/19 190

## 2019-03-19 NOTE — ED NOTES
DATE:  3/19/2019   TIME OF RECEIPT FROM LAB:  5:22 PM    LAB TEST:  Procalcitonin   LAB VALUE:  23  RESULTS GIVEN WITH READ-BACK TO (PROVIDER):  Juan Nieto, *  TIME LAB VALUE REPORTED TO PROVIDER:   5:23 PM

## 2019-03-19 NOTE — PROGRESS NOTES
RECEIVING UNIT ED HANDOFF REVIEW    ED Nurse Handoff Report was reviewed by: Yris Tejeda on March 19, 2019 at 6:12 PM

## 2019-03-19 NOTE — PHARMACY-ADMISSION MEDICATION HISTORY
Admission medication history interview status for the 3/19/2019  admission is complete. See EPIC admission navigator for prior to admission medications     Medication history source reliability:Good    Actions taken by pharmacist (provider contacted, etc): spoke to wife     Additional medication history information not noted on PTA med list :None    Medication reconciliation/reorder completed by provider prior to medication history? No    Time spent in this activity: 5 minutes    Prior to Admission medications    Medication Sig Last Dose Taking? Auth Provider   hypromellose-dextran (ARTIFICAL TEARS) 0.1-0.3 % ophthalmic solution as needed for dry eyes prn med Yes Unknown, Entered By History   ibuprofen (ADVIL/MOTRIN) 200 MG tablet Take by mouth as needed for mild pain prn med Yes Unknown, Entered By History   oxymetazoline (AFRIN) 0.05 % nasal spray Spray into both nostrils as needed for congestion prn med Yes Unknown, Entered By History   pseudoePHEDrine (SUDAFED) 30 MG tablet Take by mouth as needed for congestion prn med Yes Unknown, Entered By History   Alina Flaherty, PharmD

## 2019-03-19 NOTE — PROGRESS NOTES
RECEIVING UNIT ED HANDOFF REVIEW    ED Nurse Handoff Report was reviewed by: Lucy Lott on March 19, 2019 at 5:17 PM

## 2019-03-20 ENCOUNTER — APPOINTMENT (OUTPATIENT)
Dept: NUCLEAR MEDICINE | Facility: CLINIC | Age: 64
DRG: 871 | End: 2019-03-20
Attending: SURGERY
Payer: COMMERCIAL

## 2019-03-20 ENCOUNTER — APPOINTMENT (OUTPATIENT)
Dept: GENERAL RADIOLOGY | Facility: CLINIC | Age: 64
DRG: 871 | End: 2019-03-20
Attending: INTERNAL MEDICINE
Payer: COMMERCIAL

## 2019-03-20 ENCOUNTER — APPOINTMENT (OUTPATIENT)
Dept: CT IMAGING | Facility: CLINIC | Age: 64
DRG: 871 | End: 2019-03-20
Attending: INTERNAL MEDICINE
Payer: COMMERCIAL

## 2019-03-20 LAB
ALBUMIN SERPL-MCNC: 2.2 G/DL (ref 3.4–5)
ALBUMIN UR-MCNC: 100 MG/DL
ALP SERPL-CCNC: 41 U/L (ref 40–150)
ALT SERPL W P-5'-P-CCNC: 20 U/L (ref 0–70)
ANION GAP SERPL CALCULATED.3IONS-SCNC: 9 MMOL/L (ref 3–14)
APPEARANCE UR: ABNORMAL
AST SERPL W P-5'-P-CCNC: 18 U/L (ref 0–45)
BILIRUB DIRECT SERPL-MCNC: 0.8 MG/DL (ref 0–0.2)
BILIRUB SERPL-MCNC: 1.6 MG/DL (ref 0.2–1.3)
BILIRUB UR QL STRIP: NEGATIVE
BUN SERPL-MCNC: 34 MG/DL (ref 7–30)
CALCIUM SERPL-MCNC: 7.7 MG/DL (ref 8.5–10.1)
CHLORIDE SERPL-SCNC: 110 MMOL/L (ref 94–109)
CO2 SERPL-SCNC: 21 MMOL/L (ref 20–32)
COLOR UR AUTO: YELLOW
CREAT SERPL-MCNC: 1.65 MG/DL (ref 0.66–1.25)
ERYTHROCYTE [DISTWIDTH] IN BLOOD BY AUTOMATED COUNT: 13.8 % (ref 10–15)
GFR SERPL CREATININE-BSD FRML MDRD: 43 ML/MIN/{1.73_M2}
GLUCOSE SERPL-MCNC: 93 MG/DL (ref 70–99)
GLUCOSE UR STRIP-MCNC: NEGATIVE MG/DL
HCT VFR BLD AUTO: 40 % (ref 40–53)
HGB BLD-MCNC: 13.1 G/DL (ref 13.3–17.7)
HGB BLD-MCNC: 14.9 G/DL (ref 13.3–17.7)
HGB UR QL STRIP: ABNORMAL
KETONES UR STRIP-MCNC: 5 MG/DL
LACTATE BLD-SCNC: 1.6 MMOL/L (ref 0.7–2)
LACTATE BLD-SCNC: 1.8 MMOL/L (ref 0.7–2)
LACTATE BLD-SCNC: 2.1 MMOL/L (ref 0.7–2)
LACTATE BLD-SCNC: 3.6 MMOL/L (ref 0.7–2)
LACTATE BLD-SCNC: 4.1 MMOL/L (ref 0.7–2)
LEUKOCYTE ESTERASE UR QL STRIP: NEGATIVE
LIPASE SERPL-CCNC: 210 U/L (ref 73–393)
LIPASE SERPL-CCNC: 332 U/L (ref 73–393)
MCH RBC QN AUTO: 29.6 PG (ref 26.5–33)
MCHC RBC AUTO-ENTMCNC: 32.8 G/DL (ref 31.5–36.5)
MCV RBC AUTO: 90 FL (ref 78–100)
MUCOUS THREADS #/AREA URNS LPF: PRESENT /LPF
NITRATE UR QL: NEGATIVE
PH UR STRIP: 5.5 PH (ref 5–7)
PLATELET # BLD AUTO: 129 10E9/L (ref 150–450)
POTASSIUM SERPL-SCNC: 3.8 MMOL/L (ref 3.4–5.3)
PROT SERPL-MCNC: 6.2 G/DL (ref 6.8–8.8)
RBC # BLD AUTO: 4.43 10E12/L (ref 4.4–5.9)
RBC #/AREA URNS AUTO: 0 /HPF (ref 0–2)
SODIUM SERPL-SCNC: 140 MMOL/L (ref 133–144)
SOURCE: ABNORMAL
SP GR UR STRIP: 1.03 (ref 1–1.03)
SQUAMOUS #/AREA URNS AUTO: 1 /HPF (ref 0–1)
UROBILINOGEN UR STRIP-MCNC: 2 MG/DL (ref 0–2)
WBC # BLD AUTO: 15 10E9/L (ref 4–11)
WBC #/AREA URNS AUTO: 4 /HPF (ref 0–5)

## 2019-03-20 PROCEDURE — 83605 ASSAY OF LACTIC ACID: CPT | Performed by: INTERNAL MEDICINE

## 2019-03-20 PROCEDURE — 74177 CT ABD & PELVIS W/CONTRAST: CPT

## 2019-03-20 PROCEDURE — 99291 CRITICAL CARE FIRST HOUR: CPT | Performed by: INTERNAL MEDICINE

## 2019-03-20 PROCEDURE — 25000128 H RX IP 250 OP 636: Performed by: INTERNAL MEDICINE

## 2019-03-20 PROCEDURE — 82248 BILIRUBIN DIRECT: CPT | Performed by: INTERNAL MEDICINE

## 2019-03-20 PROCEDURE — 25800030 ZZH RX IP 258 OP 636: Performed by: INTERNAL MEDICINE

## 2019-03-20 PROCEDURE — 85027 COMPLETE CBC AUTOMATED: CPT | Performed by: INTERNAL MEDICINE

## 2019-03-20 PROCEDURE — 83690 ASSAY OF LIPASE: CPT | Performed by: INTERNAL MEDICINE

## 2019-03-20 PROCEDURE — 34300033 ZZH RX 343: Performed by: INTERNAL MEDICINE

## 2019-03-20 PROCEDURE — 80053 COMPREHEN METABOLIC PANEL: CPT | Performed by: INTERNAL MEDICINE

## 2019-03-20 PROCEDURE — 36415 COLL VENOUS BLD VENIPUNCTURE: CPT | Performed by: INTERNAL MEDICINE

## 2019-03-20 PROCEDURE — A9537 TC99M MEBROFENIN: HCPCS | Performed by: INTERNAL MEDICINE

## 2019-03-20 PROCEDURE — 71045 X-RAY EXAM CHEST 1 VIEW: CPT

## 2019-03-20 PROCEDURE — 78226 HEPATOBILIARY SYSTEM IMAGING: CPT

## 2019-03-20 PROCEDURE — 81001 URINALYSIS AUTO W/SCOPE: CPT | Performed by: INTERNAL MEDICINE

## 2019-03-20 PROCEDURE — 99221 1ST HOSP IP/OBS SF/LOW 40: CPT | Performed by: SURGERY

## 2019-03-20 PROCEDURE — A9270 NON-COVERED ITEM OR SERVICE: HCPCS | Performed by: INTERNAL MEDICINE

## 2019-03-20 PROCEDURE — 25000132 ZZH RX MED GY IP 250 OP 250 PS 637: Performed by: INTERNAL MEDICINE

## 2019-03-20 PROCEDURE — C9113 INJ PANTOPRAZOLE SODIUM, VIA: HCPCS | Performed by: INTERNAL MEDICINE

## 2019-03-20 PROCEDURE — 25000125 ZZHC RX 250: Performed by: INTERNAL MEDICINE

## 2019-03-20 PROCEDURE — 12000000 ZZH R&B MED SURG/OB

## 2019-03-20 PROCEDURE — 85018 HEMOGLOBIN: CPT | Performed by: INTERNAL MEDICINE

## 2019-03-20 RX ORDER — IOPAMIDOL 755 MG/ML
103 INJECTION, SOLUTION INTRAVASCULAR ONCE
Status: COMPLETED | OUTPATIENT
Start: 2019-03-20 | End: 2019-03-20

## 2019-03-20 RX ORDER — KIT FOR THE PREPARATION OF TECHNETIUM TC 99M MEBROFENIN 45 MG/10ML
6 INJECTION, POWDER, LYOPHILIZED, FOR SOLUTION INTRAVENOUS ONCE
Status: COMPLETED | OUTPATIENT
Start: 2019-03-20 | End: 2019-03-20

## 2019-03-20 RX ORDER — PIPERACILLIN SODIUM, TAZOBACTAM SODIUM 3; .375 G/15ML; G/15ML
3.38 INJECTION, POWDER, LYOPHILIZED, FOR SOLUTION INTRAVENOUS EVERY 6 HOURS
Status: DISCONTINUED | OUTPATIENT
Start: 2019-03-20 | End: 2019-03-24

## 2019-03-20 RX ADMIN — PANTOPRAZOLE SODIUM 40 MG: 40 INJECTION, POWDER, FOR SOLUTION INTRAVENOUS at 08:38

## 2019-03-20 RX ADMIN — HYDROMORPHONE HYDROCHLORIDE 0.5 MG: 1 INJECTION, SOLUTION INTRAMUSCULAR; INTRAVENOUS; SUBCUTANEOUS at 16:15

## 2019-03-20 RX ADMIN — PIPERACILLIN SODIUM,TAZOBACTAM SODIUM 3.38 G: 3; .375 INJECTION, POWDER, FOR SOLUTION INTRAVENOUS at 03:29

## 2019-03-20 RX ADMIN — PIPERACILLIN SODIUM,TAZOBACTAM SODIUM 3.38 G: 3; .375 INJECTION, POWDER, FOR SOLUTION INTRAVENOUS at 09:47

## 2019-03-20 RX ADMIN — SENNOSIDES AND DOCUSATE SODIUM 1 TABLET: 8.6; 5 TABLET ORAL at 08:38

## 2019-03-20 RX ADMIN — SODIUM CHLORIDE: 9 INJECTION, SOLUTION INTRAVENOUS at 01:04

## 2019-03-20 RX ADMIN — SODIUM CHLORIDE 71 ML: 9 INJECTION, SOLUTION INTRAVENOUS at 19:31

## 2019-03-20 RX ADMIN — IOPAMIDOL 99 ML: 755 INJECTION, SOLUTION INTRAVENOUS at 19:32

## 2019-03-20 RX ADMIN — SODIUM CHLORIDE: 9 INJECTION, SOLUTION INTRAVENOUS at 05:58

## 2019-03-20 RX ADMIN — HYDROMORPHONE HYDROCHLORIDE 0.5 MG: 1 INJECTION, SOLUTION INTRAMUSCULAR; INTRAVENOUS; SUBCUTANEOUS at 06:47

## 2019-03-20 RX ADMIN — HYDROMORPHONE HYDROCHLORIDE 0.5 MG: 1 INJECTION, SOLUTION INTRAMUSCULAR; INTRAVENOUS; SUBCUTANEOUS at 08:38

## 2019-03-20 RX ADMIN — SODIUM CHLORIDE, POTASSIUM CHLORIDE, SODIUM LACTATE AND CALCIUM CHLORIDE 1000 ML: 600; 310; 30; 20 INJECTION, SOLUTION INTRAVENOUS at 01:50

## 2019-03-20 RX ADMIN — SODIUM CHLORIDE: 9 INJECTION, SOLUTION INTRAVENOUS at 21:09

## 2019-03-20 RX ADMIN — PIPERACILLIN SODIUM,TAZOBACTAM SODIUM 3.38 G: 3; .375 INJECTION, POWDER, FOR SOLUTION INTRAVENOUS at 21:05

## 2019-03-20 RX ADMIN — SODIUM CHLORIDE: 9 INJECTION, SOLUTION INTRAVENOUS at 12:30

## 2019-03-20 RX ADMIN — SODIUM CHLORIDE, POTASSIUM CHLORIDE, SODIUM LACTATE AND CALCIUM CHLORIDE 1000 ML: 600; 310; 30; 20 INJECTION, SOLUTION INTRAVENOUS at 04:02

## 2019-03-20 RX ADMIN — HYDROMORPHONE HYDROCHLORIDE 0.5 MG: 1 INJECTION, SOLUTION INTRAMUSCULAR; INTRAVENOUS; SUBCUTANEOUS at 19:59

## 2019-03-20 RX ADMIN — LIDOCAINE HYDROCHLORIDE 10 ML: 20 JELLY TOPICAL at 01:21

## 2019-03-20 RX ADMIN — ACETAMINOPHEN 650 MG: 325 TABLET, FILM COATED ORAL at 10:28

## 2019-03-20 RX ADMIN — PIPERACILLIN SODIUM,TAZOBACTAM SODIUM 3.38 G: 3; .375 INJECTION, POWDER, FOR SOLUTION INTRAVENOUS at 16:15

## 2019-03-20 RX ADMIN — MEBROFENIN 6.3 MILLICURIE: 45 INJECTION, POWDER, LYOPHILIZED, FOR SOLUTION INTRAVENOUS at 13:28

## 2019-03-20 RX ADMIN — HYDROMORPHONE HYDROCHLORIDE 0.5 MG: 1 INJECTION, SOLUTION INTRAMUSCULAR; INTRAVENOUS; SUBCUTANEOUS at 01:53

## 2019-03-20 RX ADMIN — PANTOPRAZOLE SODIUM 40 MG: 40 INJECTION, POWDER, FOR SOLUTION INTRAVENOUS at 21:05

## 2019-03-20 ASSESSMENT — ACTIVITIES OF DAILY LIVING (ADL)
ADLS_ACUITY_SCORE: 13

## 2019-03-20 ASSESSMENT — MIFFLIN-ST. JEOR: SCORE: 1707.52

## 2019-03-20 NOTE — PLAN OF CARE
Pt. A & O x 4.  Adequate urinary output via falk.  NPO except ice chips & meds.  BS Hyperactive; abdomen distended & tender with palpation.  Stand by assist.  Tele: Sinus Tachy.  92% on 3L O2 via oxy mask.  Pain managed w/ tylenol & PRN dilaudid; dilaudid held for nuclear medicine test (4 hours prior).  Went down to nuclear medicine at 1300.  Tachy w/ stable blood pressures.  Temp: 98.7 this shift.

## 2019-03-20 NOTE — CONSULTS
[facility] General Surgery Consultation    Sanjeev Blackmon MRN# [id number]   YOB: 1955 Age: 63 year old      Date of Admission:  3/19/2019  Date of Consult: 3/20/2019         Assessment and Plan:   Patient is a 63 year old male with upper abdominal pain and CT with inflammation surrounding the gastric antrum and retroperitoneal inflammation. He had an US of the gallbladder which revealed mild wall thickening and sludge within the gallbladder. He is tachycardic, hypotensive overnight, responding to fluid resuscitation and IV abx. His WBC has improved this morning. Given his clinical picture and review of the mild wall thickening on US of the gallbladder, I would be surprised if his presentation is related to cholecystitis. A HIDA scan would be helpful for further evaluation. With the retroperitoneal inflammation and inflammation surrounding the gastric antrum - I would consider further evaluation with EGD but defer to GI regarding these recommendations.     PLAN:  HIDA stat - if positive - laparoscopic cholecystectomy  Surgery will continue to follow         Requesting Physician:      Dr. Paul        Chief Complaint:     Chief Complaint   Patient presents with     Nausea & Vomiting     x1 today. Started Monday. Has not seen PCP for this.      Abdominal Pain     Since Monday.           History of Present Illness:   Sanjeev Blackmon is a 63 year old male who was seen in consultation at the request of Dr. Ponce who presented with abdominal and emesis. He was tachycardic, hypotensive and febrile on admission. His labs revealed elevated bilirubin at 2.5 which has now improved to 1.5, lactatic acidosis now resolved, leukocytosis initially 20,000, now 15,000. He c/o upper abdominal pain and right upper pain. He has mild nausea. no further emesis. He reports he ate a bagel on Monday morning and shortly thereafter began having nausea, emesis and abdominal pain. He had an US and CT, results  "above. His labs also reveal BRANDEN.            Physical Exam:   Blood pressure 125/79, pulse 125, temperature 98.6  F (37  C), temperature source Oral, resp. rate 27, height 1.753 m (5' 9\"), weight 93.2 kg (205 lb 8 oz), SpO2 91 %.  205 lbs 8 oz  General: sitting up in bed, mild distress  Psych: Alert and Oriented.  Normal affect  Neurological: grossly intact  Eyes: Sclera clear  Respiratory:  Appears short of breath, shallow breathing  Cardiovascular:  Regular Rate and Rhythm   GI: abdomen is distended, tender in epigastrium and RUQ, no hernias, no rebound or guarding   Lymphatic/Hematologic/Immune:  No femoral or cervical lymphadenopathy.  Integumentary:  No rashes         Past Medical History:     Past Medical History:   Diagnosis Date     Nasal polyps 12/17/2012            Past Surgical History:     Past Surgical History:   Procedure Laterality Date     ABLATE VEIN VARICOSE RADIO FREQUENCY WITH PHLEBECTOMY MULTIPLE STAB  2011    Dr. Do     Scleral lesion excision  2006     SINUS SURGERY  1979     SINUS SURGERY  1989     SINUS SURGERY  1999     SINUS SURGERY  2010    Dr. Samson            Current Medications:           pantoprazole  40 mg Intravenous BID     piperacillin-tazobactam  3.375 g Intravenous Q6H     senna-docusate  1 tablet Oral BID    Or     senna-docusate  2 tablet Oral BID     sodium chloride (PF)  3 mL Intracatheter Q8H       acetaminophen, acetaminophen, bisacodyl **OR** bisacodyl **OR** bisacodyl, HYDROmorphone, lidocaine 4%, lidocaine (buffered or not buffered), magnesium sulfate, melatonin, metoclopramide **OR** metoclopramide, naloxone, nitroGLYcerin, ondansetron **OR** ondansetron, oxyCODONE, polyethylene glycol, potassium chloride, potassium chloride with lidocaine, potassium chloride, potassium chloride, potassium chloride, prochlorperazine **OR** prochlorperazine **OR** prochlorperazine, sodium chloride (PF)         Home Medications:     Prior to Admission medications    Medication Sig " Last Dose Taking? Auth Provider   hypromellose-dextran (ARTIFICAL TEARS) 0.1-0.3 % ophthalmic solution as needed for dry eyes prn med Yes Unknown, Entered By History   ibuprofen (ADVIL/MOTRIN) 200 MG tablet Take by mouth as needed for mild pain prn med Yes Unknown, Entered By History   oxymetazoline (AFRIN) 0.05 % nasal spray Spray into both nostrils as needed for congestion prn med Yes Unknown, Entered By History   pseudoePHEDrine (SUDAFED) 30 MG tablet Take by mouth as needed for congestion prn med Yes Unknown, Entered By History            Allergies:   No Known Allergies         Family History:     Family History   Problem Relation Age of Onset     Coronary Artery Disease Father            Social History:   Sanjeev Blackmon  reports that  has never smoked. he has never used smokeless tobacco. He reports that he drinks about 2.0 - 3.0 oz of alcohol per week.          Review of Systems:   The 10 point Review of Systems is negative other than noted in the HPI.         Labs/Imaging   All new lab and imaging data was reviewed.   I have personally reviewed the imaging studies including his abdominal CT and US        Leny Guerra MD

## 2019-03-20 NOTE — PLAN OF CARE
A/Ox4. Tachycardic 120s-130s. Respirations 18-30. BP's soft majority of shift. Max temp of 99.2 Tele: ST. On 1lpm NC. Lung sounds diminished. BS hypo, +flatus. Abd distended. Pain in epigastric region. Managing pain with prn dilaudid. Pt belching a lot when sitting up, stated he feels better each time he burps.  Adequate urine output. Jordan patent. NPO with ice chips. Up with sba.

## 2019-03-20 NOTE — PROGRESS NOTES
Surgery Update    HIDA reviewed and negative for acute cholecystitis. Pt examined. His abdomen remains distended, but is slightly improved since this morning. His tachycardia and fever curve have improved. He is passing gas had has had two stools today. Pt seen by Dr. Alvarez and CT abdomen has been ordered. I will follow up on this CT as well. No surgical indications at this time. We will follow closely along.     Leny Guerra MD  [facility]  [phone]

## 2019-03-20 NOTE — PROGRESS NOTES
[facility]    Medicine Progress Note - Hospitalist Service       Date of Admission:  3/19/2019  Assessment & Plan   Sanjeev Blackmon is a previously healthy 63 year-old male admitted on 3/19/2019 after presenting with 1 day of dry heaves with epigastrium pain, and fevers to 102F. On presentation he was tachycardic with SBP in the low 90's. Oxygenation 90-91% on RA. Labs significant for BUN/Cr 34/1.89 (baseline Cr 1.05), bili 2.5, liver enzymes o/w nl, lipase 332. Lactic acid 2.7, procal 23, WBC 20.3, hgb 15.8. D-dimer ordered by RN in triage due to pain in the abdomen with deep breaths was up at 1.4. Otherwise, no chest pain, SOB, leg pain or risk factors. Patient denies any hematemesis, black or bloody stools. Just took ibuprofen 2 tabs x 3 in the last 2 days. No recent alcohol use.      Severe sepsis from intraabdominal source, unclear etiology  Presents with epigastric pain and fevers. CT abdomen/pelvis shows inflammatory changes in upper abdomen retroperitoneum around the gastric antrum, suggestive of pancreatitis vs gastritis/gastric ulcer. RUQ U/S shows sludge within the gallbladder with mild wall thickening measuring 0.3 cm, possible cholecystitis. No biliary dilation.   - Lactic acid peak of 4.1, trending down and subsequently normalized before slight elevation again. Continue to trend q6H.  - Serial lipase have been normal   - Continue piperacillin-tazobactam IV  - General surgery consulted, discussed with Dr. Guerra in AM, obtained STAT HIDA scan which was negative for cholecystitis  - GI consulted, discussed with Evin Wilkinson PA-C in AM, no immediate plans for EGD or EUS and agreed with surgical evaluation for cholecystectomy. Discussed in PM with Dr. Alvarez given HIDA results, she ordered CT abd/pelvis w/o contrast to reassess inflammatory changes as well as for any obstructive changes or perforation  - On serial abdominal exams, he was more tender and localized to RUQ in AM, more  distended and tympanic mostly over stomach in PM. No rebound tenderness or guarding on either exam. Reports passing gas, no n/v 3/20  - Continue IVF, reduce rate to 100 ml/hr and can given additional boluses as needed for BP or lactic acid levels  - Continue NPO  - Continue pantoprazole IV BID  - Blood cultures NGTD  - WBC trending down, continue to monitor   - Recheck CMP in AM    Mild hypoxia  SpO2 90% on RA. Suspect secondary to atelectasis seen on CT from upper abdominal pain with deep breaths. See below regarding PE.  - Oxygen as needed, wean as tolerated  - Encourage IS    History of superficial vein thrombosis of right leg  Elevated D-dimer  D-dimer was elevated after being ordered in triage without symptoms suggestive of PE. S/p vein surgery, otherwise no personal or family history of DVT/PE. No recent immobilization, surgery, cancer. Bilateral legs negative for DVT. Clinical suspicion for PE is low, presenting symptoms and abnormalities attributed to abdominal pathology.   - Monitor     Diet: NPO for Medical/Clinical Reasons Except for: Ice Chips, Meds    DVT Prophylaxis: Pneumatic Compression Devices  Jordan Catheter: in place, indication: Strict 1-2 Hour I&O  Code Status: Full Code      Disposition Plan   Expected discharge: Unclear[place] [facility] [place]   Entered: Meliton Ponce MD 03/20/2019, 9:03 AM       The patient's care was discussed with the Bedside Nurse, Patient and Surgery and Gastroenterology Team.    Time Spent on this Encounter   Sanjeev Blackmon is critically ill due to severe intraabdominal sepsis with unclear source, which required multiple assessments at the bedside and direct consultation with two two consulting physicians. I spent 60 minutes managing the critical care of Sanjeev Blackmon in relation to the issues listed in this note.      Meliton Ponce MD  Hospitalist Service  [facility]  Hospital    ______________________________________________________________________    Interval History   Overnight events reviewed. Reports overall he feels slight improvement in pain, reports mostly in upper abdomen particularly RUQ, but diffuse component as well. Denies n/v, diarrhea. Passing gas. Occasional cough.     Data reviewed today: I reviewed all medications, new labs and imaging results over the last 24 hours. I personally reviewed no images or EKG's today.    Physical Exam   Vital Signs: Temp: 98.6  F (37  C) Temp src: Oral BP: 125/79 Pulse: 125 Heart Rate: 123 Resp: (!) 37 SpO2: 92 % O2 Device: Oxymask Oxygen Delivery: 3 LPM  Weight: 205 lbs 8 oz    Constitutional: Appears ill, but not acutely distressed  Respiratory: Diminished at bilateral bases, pain in abdomen reported with deep inspiration, no wheezes  Cardiovascular: Tachycardic with regular rhythm. No peripheral edema.  GI: See A/P for serial exams from day.  Skin/Integumen: Warm, dry  Other:     Data   Recent Labs   Lab 03/20/19  0451 03/20/19  0222 03/19/19  2145  03/19/19  1450   WBC 15.0*  --   --   --  20.3*   HGB 13.1* 14.9 14.8   < > 15.8   MCV 90  --   --   --  88   *  --   --   --  152     --   --   --  137   POTASSIUM 3.8  --   --   --  4.0   CHLORIDE 110*  --   --   --  102   CO2 21  --   --   --  23   BUN 34*  --   --   --  34*   CR 1.65*  --   --   --  1.89*   ANIONGAP 9  --   --   --  12   LEANNA 7.7*  --   --   --  9.2   GLC 93  --   --   --  123*   ALBUMIN 2.2*  --   --   --  3.1*   PROTTOTAL 6.2*  --   --   --  7.9   BILITOTAL 1.6*  --   --   --  2.5*   ALKPHOS 41  --   --   --  73   ALT 20  --   --   --  27   AST 18  --   --   --  18   LIPASE 332  --   --   --  332   TROPI  --   --   --   --  <0.015    < > = values in this interval not displayed.       Recent Results (from the past 24 hour(s))   CT Abdomen Pelvis w Contrast    Narrative    CT ABDOMEN AND PELVIS WITH CONTRAST   3/19/2019 3:34 PM     HISTORY:  Abdominal  distension. Tenderness with vomiting and fevers.    TECHNIQUE:  CT abdomen and pelvis with 95 mL Isovue-370 IV. Radiation  dose for this scan was reduced using automated exposure control,  adjustment of the mA and/or kV according to patient size, or iterative  reconstruction technique.    COMPARISON: None.    FINDINGS:  Abdomen: There is atelectasis at the lung bases. Consolidation in the  left posterior costophrenic angle may be pneumonia. The heart size is  normal. The liver, spleen and gallbladder are normal in appearance.  There is fluid stranding in the fat of the upper abdomen abutting the  gastric antrum as well as the pancreas. There is fluid stranding  extending down the retroperitoneum bilaterally, left greater than  right. No focal fluid collection to suggest pseudocyst or abscess. No  evidence of perforation. The pancreas otherwise appears normal. The  adrenal glands are normal in appearance. There are a few renal  cortical cysts bilaterally. No hydronephrosis. There is no abdominal  or pelvic lymph node enlargement. There is a retroaortic left renal  vein. There is atherosclerotic calcification of the aorta and its  branches. No aneurysm.    Pelvis: There are colonic diverticula without acute diverticulitis.  There is no bowel obstruction or inflammation. No free intraperitoneal  gas or fluid.      Impression    IMPRESSION:  1. There are inflammatory changes in the upper abdomen and  retroperitoneum which abut the gastric antrum and the pancreas.  Possible etiologies include gastritis/gastric ulcer disease as well as  acute pancreatitis. No focal abscess or pseudocyst.  2. There are colonic diverticula without acute diverticulitis. No  bowel obstruction.  3. Left lung base consolidation may be pneumonia. There is dependent  atelectasis bilaterally.    YAAKOV RAZO MD   Abdomen US, limited (RUQ only)    Narrative    RIGHT UPPER QUADRANT ULTRASOUND 3/19/2019 4:57 PM    HISTORY:  Right upper quadrant  pain.    COMPARISON: CT abdomen and pelvis from today.    FINDINGS:    Gallbladder: No echogenic gallstones. There does appear to be sludge  within the gallbladder. Gallbladder wall is mildly thickened measuring  up to 0.3 cm.    Bile ducts: Common hepatic duct is within normal limits at 0.2 cm.    Liver: Normal.     Pancreas: Obscured by gas    Right kidney: There are 2 cysts, the largest measuring 4.0 cm in  diameter in the lower pole.      Impression    IMPRESSION: There is sludge within the gallbladder and there is mild  gallbladder wall thickening measuring 0.3 cm in diameter. This could  represent cholecystitis No biliary dilatation.    KELLEY TORRES MD   Chest XR,  PA & LAT    Narrative    XR CHEST 2 VW 3/19/2019 5:58 PM    COMPARISON: None.    HISTORY: Shortness of breath and hypoxia.      Impression    IMPRESSION: Mild bibasilar atelectasis/consolidation. No pleural  effusion or pneumothorax seen on either side.    JORGE CATHERINE MD   US Lower Extremity Venous Duplex Bilateral    Narrative    ULTRASOUND VENOUS LOWER EXTREMITY BILATERAL 3/19/2019 7:51 PM     HISTORY: Rule out deep venous thrombosis. Positive D-dimer, history of  superficial vein thrombosis, low suspicion. BRANDEN.    COMPARISON: None.    TECHNIQUE: Ultrasound gray scale, Color Doppler flow, and spectral  Doppler waveform analysis performed.    FINDINGS: The bilateral common femoral, superficial femoral, popliteal  and posterior tibial veins are patent and fully compressible and  demonstrate normal venous Doppler flow. 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      Impression    IMPRESSION: No deep venous thrombosis demonstrated. 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 No proximal obstruction seen, however. Follow-up exam  could be performed to see if this clears.    KELLEY TORRES MD   XR Chest Port 1 View    Narrative    XR CHEST PORT 1 VW  3/20/2019 3:34 AM      INDICATION: Upright view; evaluate for free air in abdomen.    COMPARISON: 3/19/2019.      Impression    IMPRESSION: Portable upright chest x-ray demonstrates mild atelectasis  or infiltrate at the left base. Somewhat shallow inspiration. Lungs  otherwise clear. Stable heart size. No free air demonstrated in the  upper abdomen.    CLAYTON ROSENBERG MD     Medications     sodium chloride 200 mL/hr at 03/20/19 0558       pantoprazole  40 mg Intravenous BID     piperacillin-tazobactam  3.375 g Intravenous Q6H     senna-docusate  1 tablet Oral BID    Or     senna-docusate  2 tablet Oral BID     sodium chloride (PF)  3 mL Intracatheter Q8H

## 2019-03-20 NOTE — PROVIDER NOTIFICATION
Brief update:    Paged re: tachycardia    HR in 130s  Here with severe sepsis    Recheck lactic acid now  Additional 1L LR bolus    Holding on any rate control given concern for recurrent hypotension and presentation with sepsis.    Doug Paul MD  1:48 AM    Repeat lactic acid 4.1    Tachycardia persists.     In discussion with patient, abdominal pain not significantly changed for worse or better. Believes abdomen is more distended over the past hours.    Guarding, diffusely tender to palpation, mild rebound tenderness.  Endorses abdominal pleurisy  Still alert, conversant. No longer febrile     Add on repeat CMP    UA/UC added    Broadened abx to zosyn (had been on unasyn)    Upright XR chest/abdomen to ensure no development of free air.    Consult to general surgery; discussed with Dr Santana. With severe sepsis, may need more urgent evaluation.    Additional 1L LR as pt w/o respiratory difficulty currently (5th L)  Repeat lactic acid in 2 hr    Pending surgery input, may need to transfer to ICU. Did discuss this with patient.    Doug Paul MD  3:21 AM    Will await repeat lactic acid w/ additional fluids. If pt clinically worsening or LA remains >4, will repeat CT abd non-contrast.   Awaiting CMP.   If further decompensation, will go to ICU.     ------------    Pt remains stable, lactic acid mildly improved.  Will await surgery evaluation unless clinical changes rather than pursue non-contrast CT at this time.    Doug Paul MD  5:26 AM

## 2019-03-20 NOTE — PROGRESS NOTES
Arrived to unit approx 1845. Afebrile. HR 90s-low 100s. O2 sats down to 86% on RA, placed on 2 L n/c and weaned to 1 L n/c. IVFs. Report given to oncoming nurse.

## 2019-03-20 NOTE — CONSULTS
GASTROENTEROLOGY CONSULTATION     Sanjeev Blackmon   [address] [facility] [address]   63 year old male   Admission Date/Time: 3/19/2019   Encounter Date: 3/20/2019  Primary Care Provider: Timmy Calix     Referring / Attending Physician: Kamryn Alamo   We were asked to see the patient in consultation by Dr. Alamo for evaluation of gastric inflammation.     HPI: Sanjeev Blackmon is a 63 year old male with an unremarkable past medical history who presented to the emergency department yesterday for evaluation of nausea, dry heaving and epigastric pain.  His symptoms began 2 days ago with severe nausea and dry heaving followed by epigastric discomfort and fevers up to 102.  He denies any diarrhea hematochezia or melena.    In the emergency department he was found to be tachycardic and hypotensive with oxygen saturations in the 90-91% range.  Labs were significant for an elevated BUN and creatinine.  Total bilirubin was elevated at 2.5 however his other liver enzymes were normal.  Lipase was within normal limits at 332.  Lactic acid was elevated 2.7 and his white count was elevated at 20.3.  Hemoglobin was normal at 15.8.  CT of the abdomen and pelvis was done which suggested inflammatory changes in the upper abdomen and retroperitoneum around the gastric antrum and pancreas.  Ultrasound was then done which revealed mild gallbladder wall thickening and possibly sludge in the gallbladder.  He was admitted to the floor.  Overnight he continued to be tachycardic and his lactic acid remained elevated and did not improve with fluids.  He was initially started on Unasyn and overnight this was changed to Zosyn.  General surgery was consulted   But has not been by to see the patient.  This morning the patient states that he is doing a little bit better.  The pain no longer is coming in severe waves but is more of a constant sensation in his epigastrium radiating to his back.  He denies any further nausea or  vomiting.  He has not had any diarrhea, hematochezia or melena.    Past Medical History  Past Medical History:   Diagnosis Date     Nasal polyps 12/17/2012       Past Surgical History  Past Surgical History:   Procedure Laterality Date     ABLATE VEIN VARICOSE RADIO FREQUENCY WITH PHLEBECTOMY MULTIPLE STAB  2011    Dr. oD     Scleral lesion excision  2006     SINUS SURGERY  1979     SINUS SURGERY  1989     SINUS SURGERY  1999     SINUS SURGERY  2010    Dr. Samson       Family History  Family History   Problem Relation Age of Onset     Coronary Artery Disease Father        Social History  Social History     Socioeconomic History     Marital status:      Spouse name: Katrina     Number of children: 2     Years of education: Not on file     Highest education level: Not on file   Occupational History     Occupation: Retired     Employer: [employer]   Social Needs     Financial resource strain: Not on file     Food insecurity:     Worry: Not on file     Inability: Not on file     Transportation needs:     Medical: Not on file     Non-medical: Not on file   Tobacco Use     Smoking status: Never Smoker     Smokeless tobacco: Never Used   Substance and Sexual Activity     Alcohol use: Yes     Alcohol/week: 2.0 - 3.0 oz     Types: 4 - 6 Standard drinks or equivalent per week     Drug use: Not on file     Sexual activity: Yes     Partners: Female     Comment:  in 1976   Lifestyle     Physical activity:     Days per week: Not on file     Minutes per session: Not on file     Stress: Not on file   Relationships     Social connections:     Talks on phone: Not on file     Gets together: Not on file     Attends [employer] service: Not on file     Active member of club or organization: Not on file     Attends meetings of clubs or organizations: Not on file     Relationship status: Not on file     Intimate partner violence:     Fear of current or ex partner: Not on file     Emotionally abused: Not on file      "Physically abused: Not on file     Forced sexual activity: Not on file   Other Topics Concern     Not on file   Social History Narrative     Not on file       Medications  Prior to Admission medications    Medication Sig Start Date End Date Taking? Authorizing Provider   hypromellose-dextran (ARTIFICAL TEARS) 0.1-0.3 % ophthalmic solution as needed for dry eyes   Yes Unknown, Entered By History   ibuprofen (ADVIL/MOTRIN) 200 MG tablet Take by mouth as needed for mild pain   Yes Unknown, Entered By History   oxymetazoline (AFRIN) 0.05 % nasal spray Spray into both nostrils as needed for congestion   Yes Unknown, Entered By History   pseudoePHEDrine (SUDAFED) 30 MG tablet Take by mouth as needed for congestion   Yes Unknown, Entered By History       Allergies:  Patient has no known allergies.    ROS: A ten point review of systems was obtained and negative other than the symptoms noted above in the HPI.     Physical Exam:   /79   Pulse 125   Temp 98.6  F (37  C) (Oral)   Resp 29   Ht 1.753 m (5' 9\")   Wt 93.2 kg (205 lb 8 oz)   SpO2 99%   BMI 30.35 kg/m     Constitutional: healthy, alert, no acute distress  Cardiovascular: regular rate and rhythm, no murmurs,rubs or gallops  Respiratory: clear to auscultation bilaterally  Psychiatric: normal pleasant affect  Head: atraumatic, normocephalic  Neck: supple, no thyromegaly  ENT: mucous membranes are moist, no oral lesions are noted  Abdomen: soft, non-tender, non-distended, normally active bowel sound. No masses or hepatosplenomegaly is appreciated. No rebound tenderness or guarding  Neuro: Neurologically intact grossly  Skin: warm, dry, no rashes are noted    ADDITIONAL COMMENTS:   I reviewed the patient's new clinical lab test results.   Recent Labs   Lab Test 03/20/19  0451 03/20/19  0222 03/19/19  2145  03/19/19  1450   WBC 15.0*  --   --   --  20.3*   HGB 13.1* 14.9 14.8   < > 15.8   MCV 90  --   --   --  88   *  --   --   --  152    < > = values " in this interval not displayed.      Recent Labs   Lab Test 03/20/19  0451 03/19/19  1450 03/06/15  1053 02/27/15  1117    137  --  138   POTASSIUM 3.8 4.0 4.5 5.5*   CHLORIDE 110* 102  --  101   CO2 21 23  --   --    BUN 34* 34*  --  23  22   CR 1.65* 1.89*  --  1.05   ANIONGAP 9 12  --   --    LEANNA 7.7* 9.2  --  9.7      Recent Labs   Lab Test 03/20/19  0451 03/20/19  0325 03/19/19  1450 02/27/15  1117   ALBUMIN 2.2*  --  3.1* 4.4   BILITOTAL 1.6*  --  2.5* 0.7   ALT 20  --  27 19   AST 18  --  18 16   ALKPHOS 41  --  73 68   PROTEIN  --  100*  --   --    LIPASE 332  --  332  --       3/19/19 CT abdomen pelvis with contrast  IMPRESSION:  1. There are inflammatory changes in the upper abdomen and  retroperitoneum which abut the gastric antrum and the pancreas.  Possible etiologies include gastritis/gastric ulcer disease as well as  acute pancreatitis. No focal abscess or pseudocyst.  2. There are colonic diverticula without acute diverticulitis. No  bowel obstruction.  3. Left lung base consolidation may be pneumonia. There is dependent  atelectasis bilaterally.     3/19/19 Ultrasound abdomen  IMPRESSION: There is sludge within the gallbladder and there is mild  gallbladder wall thickening measuring 0.3 cm in diameter. This could  represent cholecystitis No biliary dilatation.    Assessment: 63-year-old male presenting with nausea and epigastric pain along with fevers.  On presentation he appeared septic with tachycardia and hypotension.  His lactic acid and pro-calcitonin were elevated and his white blood count was 20.3.  CT suggested inflammatory process in the upper abdomen and retroperitoneum around the gastric antrum.  Lipase was normal, however his total bilirubin was mildly elevated at 2.5.  Ultrasound did suggest sludge and gallbladder wall thickening suggesting possible early cholecystitis. I doubt peptic ulcer disease is causing severe sepsis as no perforation was seen on imaging. Pancreatitis is  "within the differential however his lipase is normal making this less likely. Cholecystitis seems most likely given his continued issues overnight    Plan:   -Awaiting surgical consultation  -Continue PPI IV BID.   -Given lack of anemia or melena would hold off on EGD at this time  -Follow hgb and stool output  -Continue to follow LFTs  -Antibiotics per IM  -GI following    I discussed the patient's findings and plan with Dr. Yazmin Alvarez who will also independently see and examine the patient.     Mark Wilkinson PA-C  [facility]  Cell:  [phone] Monday through Friday [id number]  Office: [phone]    Staff addendum: 63 yom admitted with epigastric pain, abd distended, elevated WBC and lactate, tachycardia. CT showed inflammation about the stomach. HIDA negative, ultrasound showed sludge. WBC and lactate improved. Abd still distended. Passing gas but no BM. Pain becoming more diffuse.    /80   Pulse 110   Temp 98.2  F (36.8  C) (Oral)   Resp 24   Ht 1.753 m (5' 9\")   Wt 93.2 kg (205 lb 8 oz)   SpO2 94%   BMI 30.35 kg/m    Gen: awake alert NAD  Cor: Tachy  Abd; distended slightly firm decreased BS    A/P: 63 yom with multiple GI symptoms of unclear etiology. Doubtful PUD would cause sx unless perforated. Exam and sx suggestive of obstruction or ileus. Ischemic bowel in ddx but less likely.   -Continue NPO, IVF, abx  -Repeat CT without contrast due to elevated creatinine  -Consider MRA  -Consider EGD tomorrow if CT not revealing    Yazmin Alvarez MD  [facility]  Pager [phone]  Office [phone]      "

## 2019-03-20 NOTE — PROVIDER NOTIFICATION
Text paged Dr. Paul regarding critical lab value, lactic 4.1. MD rounded on patient. See new orders.

## 2019-03-20 NOTE — ED PROVIDER NOTES
"Emergency Department Attending Supervision Note  3/19/2019  7:11 PM    I evaluated this patient in conjunction with KEN White    Briefly, the patient presented with abdominal pain, nausea, and vomiting.  This began yesterday morning after eating a bagel.  Nobody else ate the same food.  He has had dry heaves since then.  He had one normal stool yesterday.  The patient developed abdominal pain today.  It is in the epigastric area.  He admits to alcohol use.  He had alcohol on Friday and Saturday but none on Sunday.  The patient has not had pancreatitis in the past.    On my exam:  Patient Vitals for the past 24 hrs:   BP Temp Temp src Pulse Heart Rate Resp SpO2 Height Weight   03/19/19 1859 -- -- -- -- -- -- 93 % -- --   03/19/19 1845 (!) 136/36 98.6  F (37  C) Oral -- 99 20 (!) 86 % -- --   03/19/19 1815 -- -- -- -- 96 20 93 % -- --   03/19/19 1800 100/73 -- -- 101 100 22 92 % -- --   03/19/19 1759 -- -- -- -- 103 23 91 % -- --   03/19/19 1758 93/58 -- -- -- -- -- -- -- --   03/19/19 1735 -- -- -- -- 105 16 92 % -- --   03/19/19 1730 90/70 -- -- 103 101 20 -- -- --   03/19/19 1705 (!) 89/64 -- -- 104 103 21 -- -- --   03/19/19 1631 -- 99.6  F (37.6  C) Oral -- -- -- -- -- --   03/19/19 1630 98/69 -- -- 111 112 19 94 % -- --   03/19/19 1611 -- -- -- -- 120 22 94 % -- --   03/19/19 1610 106/73 -- -- -- 118 18 94 % -- --   03/19/19 1605 106/73 -- -- 117 120 -- 93 % -- --   03/19/19 1557 -- -- -- -- 115 23 94 % -- --   03/19/19 1545 -- -- -- -- -- -- 90 % -- --   03/19/19 1544 -- -- -- 119 -- -- 91 % -- --   03/19/19 1540 95/69 -- -- -- -- -- -- -- --   03/19/19 1511 -- 101  F (38.3  C) Oral -- -- -- -- -- --   03/19/19 1508 109/73 -- -- 118 -- -- 93 % -- --   03/19/19 1423 111/77 98.5  F (36.9  C) Oral 118 -- 18 95 % 1.753 m (5' 9\") 86.2 kg (190 lb)       Physical Exam   General:  Sitting on bed with wife at bedside.   HENT:  No obvious trauma to head  Right Ear:  External ear normal.   Left Ear:  External ear " normal.   Nose:  Nose normal.   Eyes:  Conjunctivae and EOM are normal. Pupils are equal, round, and reactive.   Neck: Normal range of motion. Neck supple. No tracheal deviation present.   CV:  Normal heart sounds. No murmur heard.  Pulm/Chest: Effort normal and breath sounds normal.   Abd: Soft. No distension. There is mild epigastric tenderness and slight guarding in this area only. There is no rigidity, no rebound and no guarding.   M/S: Normal range of motion.   Neuro: Alert. GCS 15.  Skin: Skin is warm and dry. No rash noted. Not diaphoretic.   Psych: Normal mood and affect. Behavior is normal.       Brief MDM:  Sanjeev Blackmon is a very pleasant 63 year old year old male who presents to the emergency department with concern of nausea, vomiting and abdominal pain.  Labs reveal a leukocytosis and slight elevation in his lactate.  The patient does have a temperature of 101.  Differential includes gastroenteritis, cholecystitis, choledocholithiasis, appendicitis, small bowel obstruction, diverticulitis, appendicitis, etc.  CT does show evidence of inflammatory changes around the pancreas and stomach.  There is no evidence of perforated viscus ulcer.  The patient was provided a dose of pantoprazole in case this represents gastritis.  His hemoglobin is stable and GI bleed is less likely.  I considered pancreatitis as well given the inflammation in this area but his lipase is normal.  This is still within the differential.  A right upper quadrant ultrasound obtained and shows evidence of some sludge and this could represent early cholecystitis.  Patient was provided antibiotics.  He will be admitted to the hospitalist, Dr. Alamo who has agreed to admit the patient for continued evaluation and treatment.  After receiving IV fluids the patient's blood pressure did improve and his heart rate did slow down.  Of note the RN initially obtained a d-dimer which was found to be elevated.  The patient has no shortness of  breath, no hypoxia and no recent cough.  His symptoms are mainly GI with nausea vomiting and abdominal pain.  If he develops symptoms a PE workup could be considered during the hospitalization.    My Impression and diagnosis:    ICD-10-CM    1. Nausea and vomiting, intractability of vomiting not specified, unspecified vomiting type R11.2 Procalcitonin     Procalcitonin     Hemoglobin     Lactic acid     CANCELED: Procalcitonin   2. Acute renal insufficiency N28.9    3. Sepsis, due to unspecified organism (H) A41.9    4. Abdominal pain, epigastric R10.13          Juan Nieto, DO Nieto, Juan Waddell DO  03/19/19 1917

## 2019-03-21 ENCOUNTER — APPOINTMENT (OUTPATIENT)
Dept: NUCLEAR MEDICINE | Facility: CLINIC | Age: 64
DRG: 871 | End: 2019-03-21
Attending: NURSE PRACTITIONER
Payer: COMMERCIAL

## 2019-03-21 ENCOUNTER — ANESTHESIA (OUTPATIENT)
Dept: GASTROENTEROLOGY | Facility: CLINIC | Age: 64
DRG: 871 | End: 2019-03-21
Payer: COMMERCIAL

## 2019-03-21 ENCOUNTER — ANESTHESIA EVENT (OUTPATIENT)
Dept: GASTROENTEROLOGY | Facility: CLINIC | Age: 64
DRG: 871 | End: 2019-03-21
Payer: COMMERCIAL

## 2019-03-21 ENCOUNTER — RESULTS ONLY (OUTPATIENT)
Facility: CLINIC | Age: 64
End: 2019-03-21

## 2019-03-21 LAB
ALBUMIN SERPL-MCNC: 1.9 G/DL (ref 3.4–5)
ALP SERPL-CCNC: 41 U/L (ref 40–150)
ALT SERPL W P-5'-P-CCNC: 17 U/L (ref 0–70)
ANION GAP SERPL CALCULATED.3IONS-SCNC: 7 MMOL/L (ref 3–14)
ANION GAP SERPL CALCULATED.3IONS-SCNC: 8 MMOL/L (ref 3–14)
AST SERPL W P-5'-P-CCNC: 22 U/L (ref 0–45)
BASE DEFICIT BLDA-SCNC: 4.1 MMOL/L
BILIRUB SERPL-MCNC: 1 MG/DL (ref 0.2–1.3)
BUN SERPL-MCNC: 32 MG/DL (ref 7–30)
BUN SERPL-MCNC: 35 MG/DL (ref 7–30)
CALCIUM SERPL-MCNC: 7.9 MG/DL (ref 8.5–10.1)
CALCIUM SERPL-MCNC: 8.1 MG/DL (ref 8.5–10.1)
CHLORIDE SERPL-SCNC: 111 MMOL/L (ref 94–109)
CHLORIDE SERPL-SCNC: 114 MMOL/L (ref 94–109)
CO2 SERPL-SCNC: 20 MMOL/L (ref 20–32)
CO2 SERPL-SCNC: 24 MMOL/L (ref 20–32)
CREAT SERPL-MCNC: 1.28 MG/DL (ref 0.66–1.25)
CREAT SERPL-MCNC: 1.5 MG/DL (ref 0.66–1.25)
ERYTHROCYTE [DISTWIDTH] IN BLOOD BY AUTOMATED COUNT: 14.4 % (ref 10–15)
GFR SERPL CREATININE-BSD FRML MDRD: 48 ML/MIN/{1.73_M2}
GFR SERPL CREATININE-BSD FRML MDRD: 58 ML/MIN/{1.73_M2}
GLUCOSE BLDC GLUCOMTR-MCNC: 68 MG/DL (ref 70–99)
GLUCOSE BLDC GLUCOMTR-MCNC: 99 MG/DL (ref 70–99)
GLUCOSE SERPL-MCNC: 108 MG/DL (ref 70–99)
GLUCOSE SERPL-MCNC: 78 MG/DL (ref 70–99)
HCO3 BLD-SCNC: 21 MMOL/L (ref 21–28)
HCT VFR BLD AUTO: 36 % (ref 40–53)
HGB BLD-MCNC: 11.8 G/DL (ref 13.3–17.7)
INR PPP: 1.39 (ref 0.86–1.14)
INTERPRETATION ECG - MUSE: NORMAL
LACTATE BLD-SCNC: 1.2 MMOL/L (ref 0.7–2)
LACTATE BLD-SCNC: 1.4 MMOL/L (ref 0.7–2)
LACTATE BLD-SCNC: 1.5 MMOL/L (ref 0.7–2)
LACTATE BLD-SCNC: 1.5 MMOL/L (ref 0.7–2)
LIPASE SERPL-CCNC: 128 U/L (ref 73–393)
LIPASE SERPL-CCNC: 94 U/L (ref 73–393)
MAGNESIUM SERPL-MCNC: 1.8 MG/DL (ref 1.6–2.3)
MCH RBC QN AUTO: 29.6 PG (ref 26.5–33)
MCHC RBC AUTO-ENTMCNC: 32.8 G/DL (ref 31.5–36.5)
MCV RBC AUTO: 91 FL (ref 78–100)
O2/TOTAL GAS SETTING VFR VENT: 15 %
OXYHGB MFR BLD: 91 % (ref 92–100)
PCO2 BLD: 37 MM HG (ref 35–45)
PH BLD: 7.36 PH (ref 7.35–7.45)
PLATELET # BLD AUTO: 127 10E9/L (ref 150–450)
PO2 BLD: 62 MM HG (ref 80–105)
POTASSIUM SERPL-SCNC: 3.7 MMOL/L (ref 3.4–5.3)
POTASSIUM SERPL-SCNC: 4.1 MMOL/L (ref 3.4–5.3)
PROT SERPL-MCNC: 6.1 G/DL (ref 6.8–8.8)
RBC # BLD AUTO: 3.98 10E12/L (ref 4.4–5.9)
SODIUM SERPL-SCNC: 142 MMOL/L (ref 133–144)
SODIUM SERPL-SCNC: 142 MMOL/L (ref 133–144)
TROPONIN I SERPL-MCNC: 0.04 UG/L (ref 0–0.04)
TROPONIN I SERPL-MCNC: 0.04 UG/L (ref 0–0.04)
TROPONIN I SERPL-MCNC: 0.07 UG/L (ref 0–0.04)
TROPONIN I SERPL-MCNC: NORMAL UG/L (ref 0–0.04)
WBC # BLD AUTO: 14.6 10E9/L (ref 4–11)

## 2019-03-21 PROCEDURE — 94660 CPAP INITIATION&MGMT: CPT

## 2019-03-21 PROCEDURE — 25000125 ZZHC RX 250: Performed by: NURSE PRACTITIONER

## 2019-03-21 PROCEDURE — 93005 ELECTROCARDIOGRAM TRACING: CPT

## 2019-03-21 PROCEDURE — 93010 ELECTROCARDIOGRAM REPORT: CPT | Performed by: INTERNAL MEDICINE

## 2019-03-21 PROCEDURE — 25000128 H RX IP 250 OP 636: Performed by: INTERNAL MEDICINE

## 2019-03-21 PROCEDURE — A9270 NON-COVERED ITEM OR SERVICE: HCPCS | Performed by: INTERNAL MEDICINE

## 2019-03-21 PROCEDURE — 99207 ZZC APP CREDIT; MD BILLING SHARED VISIT: CPT | Performed by: NURSE PRACTITIONER

## 2019-03-21 PROCEDURE — 99233 SBSQ HOSP IP/OBS HIGH 50: CPT | Performed by: HOSPITALIST

## 2019-03-21 PROCEDURE — 84484 ASSAY OF TROPONIN QUANT: CPT | Performed by: INTERNAL MEDICINE

## 2019-03-21 PROCEDURE — 84484 ASSAY OF TROPONIN QUANT: CPT | Performed by: NURSE PRACTITIONER

## 2019-03-21 PROCEDURE — C9113 INJ PANTOPRAZOLE SODIUM, VIA: HCPCS | Performed by: INTERNAL MEDICINE

## 2019-03-21 PROCEDURE — 00000146 ZZHCL STATISTIC GLUCOSE BY METER IP

## 2019-03-21 PROCEDURE — 36415 COLL VENOUS BLD VENIPUNCTURE: CPT | Performed by: NURSE PRACTITIONER

## 2019-03-21 PROCEDURE — A9540 TC99M MAA: HCPCS | Performed by: INTERNAL MEDICINE

## 2019-03-21 PROCEDURE — 25000132 ZZH RX MED GY IP 250 OP 250 PS 637: Performed by: INTERNAL MEDICINE

## 2019-03-21 PROCEDURE — 85027 COMPLETE CBC AUTOMATED: CPT | Performed by: INTERNAL MEDICINE

## 2019-03-21 PROCEDURE — 40000275 ZZH STATISTIC RCP TIME EA 10 MIN

## 2019-03-21 PROCEDURE — 80053 COMPREHEN METABOLIC PANEL: CPT | Performed by: INTERNAL MEDICINE

## 2019-03-21 PROCEDURE — 36415 COLL VENOUS BLD VENIPUNCTURE: CPT | Performed by: INTERNAL MEDICINE

## 2019-03-21 PROCEDURE — 36600 WITHDRAWAL OF ARTERIAL BLOOD: CPT

## 2019-03-21 PROCEDURE — 25800030 ZZH RX IP 258 OP 636: Performed by: NURSE PRACTITIONER

## 2019-03-21 PROCEDURE — 25000128 H RX IP 250 OP 636: Performed by: NURSE PRACTITIONER

## 2019-03-21 PROCEDURE — 27210210 NM LUNG SCAN VENTILATION AND PERFUSION

## 2019-03-21 PROCEDURE — A9567 TECHNETIUM TC-99M AEROSOL: HCPCS | Performed by: INTERNAL MEDICINE

## 2019-03-21 PROCEDURE — 34300033 ZZH RX 343: Performed by: INTERNAL MEDICINE

## 2019-03-21 PROCEDURE — 83605 ASSAY OF LACTIC ACID: CPT | Performed by: INTERNAL MEDICINE

## 2019-03-21 PROCEDURE — 80048 BASIC METABOLIC PNL TOTAL CA: CPT | Performed by: NURSE PRACTITIONER

## 2019-03-21 PROCEDURE — 25800030 ZZH RX IP 258 OP 636: Performed by: INTERNAL MEDICINE

## 2019-03-21 PROCEDURE — 12000000 ZZH R&B MED SURG/OB

## 2019-03-21 PROCEDURE — 99221 1ST HOSP IP/OBS SF/LOW 40: CPT | Performed by: INTERNAL MEDICINE

## 2019-03-21 PROCEDURE — 82805 BLOOD GASES W/O2 SATURATION: CPT | Performed by: NURSE PRACTITIONER

## 2019-03-21 PROCEDURE — 83690 ASSAY OF LIPASE: CPT | Performed by: NURSE PRACTITIONER

## 2019-03-21 PROCEDURE — 85610 PROTHROMBIN TIME: CPT | Performed by: NURSE PRACTITIONER

## 2019-03-21 PROCEDURE — 99231 SBSQ HOSP IP/OBS SF/LOW 25: CPT | Performed by: SURGERY

## 2019-03-21 PROCEDURE — 83735 ASSAY OF MAGNESIUM: CPT | Performed by: NURSE PRACTITIONER

## 2019-03-21 PROCEDURE — 25800025 ZZH RX 258

## 2019-03-21 RX ORDER — ADENOSINE 3 MG/ML
6 INJECTION, SOLUTION INTRAVENOUS ONCE
Status: COMPLETED | OUTPATIENT
Start: 2019-03-21 | End: 2019-03-21

## 2019-03-21 RX ORDER — METOPROLOL TARTRATE 1 MG/ML
2.5 INJECTION, SOLUTION INTRAVENOUS ONCE
Status: COMPLETED | OUTPATIENT
Start: 2019-03-21 | End: 2019-03-21

## 2019-03-21 RX ORDER — MAGNESIUM SULFATE HEPTAHYDRATE 40 MG/ML
2 INJECTION, SOLUTION INTRAVENOUS DAILY PRN
Status: DISCONTINUED | OUTPATIENT
Start: 2019-03-21 | End: 2019-03-22

## 2019-03-21 RX ORDER — MAGNESIUM SULFATE HEPTAHYDRATE 40 MG/ML
2 INJECTION, SOLUTION INTRAVENOUS ONCE
Status: COMPLETED | OUTPATIENT
Start: 2019-03-21 | End: 2019-03-21

## 2019-03-21 RX ORDER — DEXTROSE MONOHYDRATE 25 G/50ML
25 INJECTION, SOLUTION INTRAVENOUS ONCE
Status: COMPLETED | OUTPATIENT
Start: 2019-03-21 | End: 2019-03-21

## 2019-03-21 RX ORDER — DEXTROSE MONOHYDRATE 25 G/50ML
INJECTION, SOLUTION INTRAVENOUS
Status: COMPLETED
Start: 2019-03-21 | End: 2019-03-21

## 2019-03-21 RX ADMIN — PIPERACILLIN SODIUM,TAZOBACTAM SODIUM 3.38 G: 3; .375 INJECTION, POWDER, FOR SOLUTION INTRAVENOUS at 10:21

## 2019-03-21 RX ADMIN — Medication 6000 UNITS: at 03:36

## 2019-03-21 RX ADMIN — HYDROMORPHONE HYDROCHLORIDE 0.5 MG: 1 INJECTION, SOLUTION INTRAMUSCULAR; INTRAVENOUS; SUBCUTANEOUS at 00:14

## 2019-03-21 RX ADMIN — OXYCODONE HYDROCHLORIDE 10 MG: 5 TABLET ORAL at 16:54

## 2019-03-21 RX ADMIN — OXYCODONE HYDROCHLORIDE 5 MG: 5 TABLET ORAL at 21:30

## 2019-03-21 RX ADMIN — SODIUM CHLORIDE: 9 INJECTION, SOLUTION INTRAVENOUS at 14:30

## 2019-03-21 RX ADMIN — SODIUM CHLORIDE: 9 INJECTION, SOLUTION INTRAVENOUS at 04:49

## 2019-03-21 RX ADMIN — AMIODARONE HYDROCHLORIDE 1 MG/MIN: 50 INJECTION, SOLUTION INTRAVENOUS at 03:41

## 2019-03-21 RX ADMIN — AMIODARONE HYDROCHLORIDE 150 MG: 1.5 INJECTION, SOLUTION INTRAVENOUS at 13:07

## 2019-03-21 RX ADMIN — ADENOSINE 6 MG: 3 INJECTION, SOLUTION INTRAVENOUS at 02:53

## 2019-03-21 RX ADMIN — DEXTROSE MONOHYDRATE 25 ML: 25 INJECTION, SOLUTION INTRAVENOUS at 02:05

## 2019-03-21 RX ADMIN — SODIUM CHLORIDE 1000 ML: 9 INJECTION, SOLUTION INTRAVENOUS at 01:47

## 2019-03-21 RX ADMIN — HEPARIN SODIUM 1450 UNITS/HR: 10000 INJECTION, SOLUTION INTRAVENOUS at 03:36

## 2019-03-21 RX ADMIN — PIPERACILLIN SODIUM,TAZOBACTAM SODIUM 3.38 G: 3; .375 INJECTION, POWDER, FOR SOLUTION INTRAVENOUS at 16:16

## 2019-03-21 RX ADMIN — METOPROLOL TARTRATE 2.5 MG: 5 INJECTION INTRAVENOUS at 05:00

## 2019-03-21 RX ADMIN — PANTOPRAZOLE SODIUM 40 MG: 40 INJECTION, POWDER, FOR SOLUTION INTRAVENOUS at 21:30

## 2019-03-21 RX ADMIN — Medication 1 MG: at 21:30

## 2019-03-21 RX ADMIN — PANTOPRAZOLE SODIUM 40 MG: 40 INJECTION, POWDER, FOR SOLUTION INTRAVENOUS at 10:20

## 2019-03-21 RX ADMIN — SODIUM CHLORIDE 500 ML: 9 INJECTION, SOLUTION INTRAVENOUS at 03:36

## 2019-03-21 RX ADMIN — HYDROMORPHONE HYDROCHLORIDE 0.5 MG: 1 INJECTION, SOLUTION INTRAMUSCULAR; INTRAVENOUS; SUBCUTANEOUS at 13:44

## 2019-03-21 RX ADMIN — KIT FOR THE PREPARATION OF TECHNETIUM TC 99M PENTETATE 61 MILLICURIE: 20 INJECTION, POWDER, LYOPHILIZED, FOR SOLUTION INTRAVENOUS; RESPIRATORY (INHALATION) at 08:45

## 2019-03-21 RX ADMIN — AMIODARONE HYDROCHLORIDE 0.5 MG/MIN: 50 INJECTION, SOLUTION INTRAVENOUS at 08:25

## 2019-03-21 RX ADMIN — AMIODARONE HYDROCHLORIDE 150 MG: 1.5 INJECTION, SOLUTION INTRAVENOUS at 03:14

## 2019-03-21 RX ADMIN — Medication 3.6 MILLICURIE: at 08:52

## 2019-03-21 RX ADMIN — HYDROMORPHONE HYDROCHLORIDE 0.5 MG: 1 INJECTION, SOLUTION INTRAMUSCULAR; INTRAVENOUS; SUBCUTANEOUS at 11:09

## 2019-03-21 RX ADMIN — MAGNESIUM SULFATE HEPTAHYDRATE 2 G: 40 INJECTION, SOLUTION INTRAVENOUS at 02:30

## 2019-03-21 RX ADMIN — AMIODARONE HYDROCHLORIDE 0.5 MG/MIN: 50 INJECTION, SOLUTION INTRAVENOUS at 16:11

## 2019-03-21 RX ADMIN — PIPERACILLIN SODIUM,TAZOBACTAM SODIUM 3.38 G: 3; .375 INJECTION, POWDER, FOR SOLUTION INTRAVENOUS at 03:54

## 2019-03-21 RX ADMIN — PIPERACILLIN SODIUM,TAZOBACTAM SODIUM 3.38 G: 3; .375 INJECTION, POWDER, FOR SOLUTION INTRAVENOUS at 21:29

## 2019-03-21 ASSESSMENT — ACTIVITIES OF DAILY LIVING (ADL)
ADLS_ACUITY_SCORE: 13

## 2019-03-21 NOTE — PROGRESS NOTES
Patient on BiPAP 12/7 60% overnight. SPO2@ 98%. Alarm volume set at 10. Gel pad on. LS diminished. RT will continue to monitor.

## 2019-03-21 NOTE — PLAN OF CARE
VSS ex Soft BP's, on 3L oxymizer. Tele: SD tachy at times. A/Ox4. Labs: Creat elevated, decreased GFR, increased WBC, Lipase and Lactic normal. CMS intact. Pain controlled with prn IV dilaudid. Up with SBA + GB. Pt is NPO with ice chips. C/o abdominal discomfort and fullness. Denies N&V. +gas, -bm. Voiding adequately to falk. LS clear in upper lobes, diminished in lower lobes. Pt went down to CT tonight for imaging of abdomin with contrast. Will continue to monitor.

## 2019-03-21 NOTE — PROGRESS NOTES
"General Surgery Progress Note    Subjective: pt had eventful night with SVT noted and ongoing tachycardia, he was given adenosine and placed on amiodarone ggt and heparin. V/Q scan negative for PE this am.  He reports improvement overall in abdominal pain. No nausea. He is passing gas and had a bowel movement.     Objective: BP 98/79   Pulse 147   Temp 98.8  F (37.1  C) (Oral)   Resp 25   Ht 1.753 m (5' 9\")   Wt 93.2 kg (205 lb 8 oz)   SpO2 97%   BMI 30.35 kg/m    Gen: alert, sitting up in bed, appears more comfortable  CV: RRR  Pulm: nonlabored breathing  Abd: still distended and tender to palpation in epigastrium. No rebound or guarding.   Ext: WWP    Assessment/Plan:   Sanjeev Blackmon  is a 63 year old male admitted with sepsis and CT findings of retroperitoneal and perigastric edema. US revealed 3mm wall and sludge. HIDA yesterday was negative for cholecystitis. Repeat CT last night did not reveal any new findings. Sepsis of unclear etiology at this point - he has an improved abdominal exam, is having bowel function - my suspicion for a surgical intraabdominal process causing sepsis is very low at this point. Could consider upper GI with gastrograffin for further evaluation of stomach if no EGD is performed.     Leny Guerra MD  [facility]  [phone]              "

## 2019-03-21 NOTE — PROGRESS NOTES
[facility]    Medicine Progress Note - Hospitalist Service       Date of Admission:  3/19/2019  Assessment & Plan   Sanjeev Blackmon is a previously healthy 63 year-old male admitted on 3/19/2019 after presenting with 1 day of dry heaves with epigastrium pain, and fevers to 102F, clinical picture consistent with sepsis most likely due to intra-abdominal pathology based on admission CT findings.        Severe sepsis from intraabdominal source, unclear etiology  Presents with epigastric pain and fevers. CT abdomen/pelvis shows inflammatory changes in upper abdomen retroperitoneum around the gastric antrum, suggestive of pancreatitis (but serial lipase wnl) vs gastritis/gastric ulcer (repeat CT 3/20 without significant change). RUQ U/S shows sludge within the gallbladder with mild wall thickening measuring 0.3 cm, but HIDA scan 3/20 negative for cholecystitis.  Initial lactate of 4.1 trended to normal with IVF and antibiotics.    - Continue piperacillin-tazobactam IV  -  ml/hr  - continue NPO  - Continue pantoprazole IV BID  - Blood cultures NGTD  - afebrile, leukocytosis trending down  - EGD once heart rate and hypoxia improved  - GI and Surg recs appreciated    Possible A-fib/flutter  Acute increase in heart rate overnight 3/20-3/21 with EKG suggesting SVT.  Received adenosine with minimal response, but underlying rhythm appearing A-fib vs flutter.  - initiated on amiodarone overnight but remains tachycardic, will consult cardiology    BRANDEN  Baseline Cr unknown, most recent Cr 1.05 in 2015.  Admission Cr 1.89, trending down with IVF.  - Cr 1.28 on 3/21; continue IVF as above and repeat BMP in AM    Acute hypoxic respiratory failure  Possible PNA  Minimal oxygen requirements following admission, needs acutely increased with arrhythmia as above.  V/Q scan 3/21 with low probability of PE.  CT abd/pelvis 3/20 showing moderate bibasilar infiltrates.  - wean oxygen as able  - stop heparin as low  probability of PE      Diet: NPO for Medical/Clinical Reasons Except for: Ice Chips, Meds    DVT Prophylaxis: Pneumatic Compression Devices  Jordan Catheter: in place, indication: Strict 1-2 Hour I&O  Code Status: Full Code      Disposition Plan   Expected discharge: Unclear. Continue cares in [facility].   Entered: Parag Alvarenga MD 03/21/2019, 11:03 AM       The patient's care was discussed with the Bedside Nurse, Patient and Surgery Team.      Parag Alvarenga MD  Hospitalist Service  [facility]    ______________________________________________________________________    Interval History   Reports epigastric pain improved today, currently reporting more diffuse abdominal cramping.  Remains distended, but also improved today, no nausea.  Had large bowel movement.  No fever/chills.  Denies chest pain/pressure, palpitations.  States dyspnea due to inability to take deep breaths due to abd distension.     Data reviewed today: I reviewed all medications, new labs and imaging results over the last 24 hours. I personally reviewed no images or EKG's today.    Physical Exam   Vital Signs: Temp: 98.8  F (37.1  C) Temp src: Oral BP: 101/80 Pulse: 147 Heart Rate: 146 Resp: 22 SpO2: 96 % O2 Device: Oxymask Oxygen Delivery: 6 LPM  Weight: 205 lbs 8 oz    Constitutional: Well developed, well nourished male in NAD  Respiratory: Diminished at bilateral bases, pain in abdomen reported with deep inspiration, no wheezes  Cardiovascular: Tachycardic with regular rhythm. Normal s1/s2.  1+ peripheral edema.  GI: moderately distended and somewhat firm, hypoactive bowel sounds, reports diffuse tenderness with palpation  Skin/Integumen: Warm, dry  Other: alert and appropriate, cranial nerves grossly intact    Data   Recent Labs   Lab 03/21/19  0947 03/21/19  0628 03/21/19  0143  03/20/19  0451 03/20/19  0222  03/19/19  1450   WBC  --  14.6*  --   --  15.0*  --   --  20.3*   HGB  --  11.8*  --   --  13.1* 14.9   < > 15.8   MCV  --  91   --   --  90  --   --  88   PLT  --  127*  --   --  129*  --   --  152   INR  --   --  1.39*  --   --   --   --   --    NA  --  142 142  --  140  --   --  137   POTASSIUM  --  3.7 4.1  --  3.8  --   --  4.0   CHLORIDE  --  114* 111*  --  110*  --   --  102   CO2  --  20 24  --  21  --   --  23   BUN  --  32* 35*  --  34*  --   --  34*   CR  --  1.28* 1.50*  --  1.65*  --   --  1.89*   ANIONGAP  --  8 7  --  9  --   --  12   LEANNA  --  7.9* 8.1*  --  7.7*  --   --  9.2   GLC  --  108* 78  --  93  --   --  123*   ALBUMIN  --  1.9*  --   --  2.2*  --   --  3.1*   PROTTOTAL  --  6.1*  --   --  6.2*  --   --  7.9   BILITOTAL  --  1.0  --   --  1.6*  --   --  2.5*   ALKPHOS  --  41  --   --  41  --   --  73   ALT  --  17  --   --  20  --   --  27   AST  --  22  --   --  18  --   --  18   LIPASE  --  94 128   < > 332  --   --  332   TROPI 0.042 0.039  Canceled, Test credited 0.069*  --   --   --   --  <0.015    < > = values in this interval not displayed.       Recent Results (from the past 24 hour(s))   NM HepatOBiliary Scan    [facility]    NUCLEAR MEDICINE HEPATOBILIARY SCAN 3/20/2019 2:31 PM    HISTORY: Right upper quadrant pain, cholecystitis suspected. Rule out  cholecystitis.    COMPARISON: 3/19/2019 right upper quadrant ultrasound    TECHNIQUE: 6.3 mCi Tc-99m Mebrofenin injected RAC IV.    FINDINGS: There is a tiny photopenic area in the right lobe of the  liver which does not appear to correspond with the gallbladder. There  is no corresponding liver lesion on 3/19/2019 CT abdomen and pelvis.  Activity in the gallbladder identified beginning at 38 minutes. At 58  minutes the gallbladder is still fairly small but demonstrates  activity. Duodenal activity by 16 minutes and small bowel activity by  48 minutes.      Impression    IMPRESSION: No evidence for acute cholecystitis with visualization of  gallbladder and bowel activity before 60 minutes.    JERRY MASON MD   CT Abdomen Pelvis w Contrast    Narrative    CT  ABDOMEN AND PELVIS WITH CONTRAST  3/20/2019 7:36 PM    HISTORY: Abdominal distension. Evaluate for free air, obstruction,  ileus.    COMPARISON: A CT on 3/19/2019.    TECHNIQUE: Routine transverse CT imaging of the abdomen and pelvis was  performed following the uneventful administration of 90 mL Isovue-370  intravenous contrast. Radiation dose for this scan was reduced using  automated exposure control, adjustment of the mA and/or kV according  to patient size, or iterative reconstruction technique.      Impression    IMPRESSION: There has been interval placement of a catheter into the  bladder. There has been progression of mild to moderate infiltrates of  both lung bases with small bilateral pleural effusions. There has been  development of a small amount of free fluid in the pelvis. No other  change is demonstrated. This includes mild inflammatory changes of the  upper abdomen and retroperitoneum as well as colonic diverticulosis  with no evidence of diverticulitis or obstruction. No free  intraperitoneal air is seen.    ALCON CARRERO MD   NM Lung Scan Ventilation and Perfusion    Narrative    NUCLEAR MEDICINE LUNG SCAN VENTILATION AND PERFUSION   3/21/2019 9:16  AM     TECHNIQUE:  61 mCi Tc99m DTPA Vent done first, 3.6 mCi Tc99m MAA Perf  second    HISTORY:  PE suspected, intermediate probability, positive D-dimer.  Acute SVT/hypoxia from RA to now 15L.    COMPARISON:   Nuclear Study: None.  Other Relevant Studies: Chest x-ray 3/20/2019    FINDINGS:  Ventilation and perfusion static images were obtained in  anterior, Upper sor[place], left lateral, LPO, posterior, RPO, right lateral and  [place] OL[place]IRA projections. Images show no ventilation or perfusion mismatch.      Impression    IMPRESSION: Low probability of pulmonary embolus.    WILLIAM DELACRUZ DO     Medications     amiodarone 0.5 mg/min (03/21/19 0825)     HEParin 1,450 Units/hr (03/21/19 1027)     sodium chloride 100 mL/hr at 03/21/19 0800       pantoprazole  40 mg  Intravenous BID     piperacillin-tazobactam  3.375 g Intravenous Q6H     sodium chloride (PF)  3 mL Intracatheter Q8H

## 2019-03-21 NOTE — PROGRESS NOTES
"GASTROENTEROLOGY PROGRESS NOTE    CC:    SUBJECTIVE:  Feels better still pain with deep inspiration, passing flatus and having BM      OBJECTIVE:  General Appearance:  Awake alert NAD, brighter today  /85   Pulse 149   Temp 98.8  F (37.1  C) (Oral)   Resp (!) 31   Ht 1.753 m (5' 9\")   Wt 93.2 kg (205 lb 8 oz)   SpO2 95%   BMI 30.35 kg/m    Temp (24hrs), Av.6  F (37  C), Min:97.6  F (36.4  C), Max:99.7  F (37.6  C)    Patient Vitals for the past 72 hrs:   Weight   19 0542 93.2 kg (205 lb 8 oz)   19 1423 86.2 kg (190 lb)       Intake/Output Summary (Last 24 hours) at 3/21/2019 1230  Last data filed at 3/21/2019 0700  Gross per 24 hour   Intake 2200.42 ml   Output 1150 ml   Net 1050.42 ml       PHYSICAL EXAM    Cor: Tachy RR  Abd; S distended diffusely tender decreased BS        Additional Comments:  ROS, FH, SH: See initial GI consult for details.    I have reviewed the patient's new clinical lab results:    Recent Labs   Lab Test 191 19  0222  19  1450   WBC 14.6*  --  15.0*  --   --  20.3*   HGB 11.8*  --  13.1* 14.9   < > 15.8   MCV 91  --  90  --   --  88   *  --  129*  --   --  152   INR  --  1.39*  --   --   --   --     < > = values in this interval not displayed.     Recent Labs   Lab Test 19   POTASSIUM 3.7 4.1 3.8   CHLORIDE 114* 111* 110*   CO2    BUN 32* 35* 34*   ANIONGAP 8 7 9     Recent Labs   Lab Test 19  1543 19  0451 19  0325 19  1450   ALBUMIN 1.9*  --   --  2.2*  --  3.1*   BILITOTAL 1.0  --   --  1.6*  --  2.5*   ALT 17  --   --  20  --  27   AST 22  --   --  18  --  18   PROTEIN  --   --   --   --  100*  --    LIPASE 94 128 210 332  --  332         Active Problems:  Abdominal pain    Assessment: Appears to be infectious in nature but cause unclear. However appears to be improving with conservative treatment. " NO evidence of obstruction on 2nd CT    Plan:   1. EGD rescheduled for tomorrow. Heart rate too high to do today  2. Continue PPI, antibiotics  3. OK to start clears        Yazmin Alvarez MD  [facility]  Pager: [phone]  Office: [phone]

## 2019-03-21 NOTE — PROGRESS NOTES
"GASTROENTEROLOGY PROGRESS NOTE     SUBJECTIVE: Pain is slightly better today but still distended. Denies nausea, vomiting. No stool output but passing gas    OBJECTIVE:   BP (!) 86/67   Pulse 147   Temp 98.8  F (37.1  C) (Oral)   Resp 16   Ht 1.753 m (5' 9\")   Wt 93.2 kg (205 lb 8 oz)   SpO2 98%   BMI 30.35 kg/m     Temp (24hrs), Av.6  F (37  C), Min:97.6  F (36.4  C), Max:99.7  F (37.6  C)     Patient Vitals for the past 72 hrs:   Weight   19 0542 93.2 kg (205 lb 8 oz)   19 1423 86.2 kg (190 lb)        Intake/Output Summary (Last 24 hours) at 3/21/2019 0855  Last data filed at 3/21/2019 0700  Gross per 24 hour   Intake 2775.42 ml   Output 1350 ml   Net 1425.42 ml      PHYSICAL EXAM   Constitutional: Age appropriate, in bed, uncomfortable appearing  Cardiovascular: Regular rate and rhythm. No murmurs rubs or gallops  Respiratory: Clear to auscultation bilaterally  Abdomen: Soft, non-tender, moderately distended, hypoactive bowel sounds. No masses or hepatosplenomegaly appreciated. No guarding or rebound tenderness.    Additional Comments:   ROS, FH, SH: See initial GI consult for details.     I have reviewed the patient's new clinical lab results:   Recent Labs   Lab Test 19  0451 19  0222  19  1450   WBC 14.6*  --  15.0*  --   --  20.3*   HGB 11.8*  --  13.1* 14.9   < > 15.8   MCV 91  --  90  --   --  88   *  --  129*  --   --  152   INR  --  1.39*  --   --   --   --     < > = values in this interval not displayed.      Recent Labs   Lab Test 191    142 140   POTASSIUM 3.7 4.1 3.8   CHLORIDE 114* 111* 110*   CO2 20 24 21   BUN 32* 35* 34*   CR 1.28* 1.50* 1.65*   ANIONGAP 8 7 9   LEANNA 7.9* 8.1* 7.7*      Recent Labs   Lab Test 19  0628 19  0143 19  1543 19  0451 19  0325 19  1450   ALBUMIN 1.9*  --   --  2.2*  --  3.1*   BILITOTAL 1.0  --   --  1.6*  --  " 2.5*   ALT 17  --   --  20  --  27   AST 22  --   --  18  --  18   ALKPHOS 41  --   --  41  --  73   PROTEIN  --   --   --   --  100*  --    LIPASE  --  128 210 332  --  332      3/19/19 CT abdomen pelvis with contrast  IMPRESSION:  1. There are inflammatory changes in the upper abdomen and  retroperitoneum which abut the gastric antrum and the pancreas.  Possible etiologies include gastritis/gastric ulcer disease as well as  acute pancreatitis. No focal abscess or pseudocyst.  2. There are colonic diverticula without acute diverticulitis. No  bowel obstruction.  3. Left lung base consolidation may be pneumonia. There is dependent  atelectasis bilaterally.      3/19/19 Ultrasound abdomen  IMPRESSION: There is sludge within the gallbladder and there is mild  gallbladder wall thickening measuring 0.3 cm in diameter. This could  represent cholecystitis No biliary dilatation.    3/20/19 HIDA  IMPRESSION: No evidence for acute cholecystitis with visualization of  gallbladder and bowel activity before 60 minutes.    3/20/19 CT abdomen without contrast  IMPRESSION: There has been interval placement of a catheter into the  bladder. There has been progression of mild to moderate infiltrates of  both lung bases with small bilateral pleural effusions. There has been  development of a small amount of free fluid in the pelvis. No other  change is demonstrated. This includes mild inflammatory changes of the  upper abdomen and retroperitoneum as well as colonic diverticulosis  with no evidence of diverticulitis or obstruction. No free  intraperitoneal air is seen.     Assessment: 63-year-old male presenting with nausea and epigastric pain along with fevers.  On presentation he appeared septic with tachycardia and hypotension.  His lactic acid and pro-calcitonin were elevated and his white blood count was 20.3.  CT suggested inflammatory process in the upper abdomen and retroperitoneum around the gastric antrum.  Lipase was normal,  however his total bilirubin was mildly elevated at 2.5.  Ultrasound did suggest sludge and gallbladder wall thickening suggesting possible early cholecystitis. HIDA then performed which was negative for acute cholecystitis. Repeat CT still showing inflammation in the upper abdomen and retroperitoneum but no free air. Pt continued to be hypotensive and tachycardic overnight. There was concern for a PE so he was started on Heparin overnight. Now down for VQ scan. Unclear how PUD could be causing such significant sepsis without overt perforation. Pancreatitis still in the differential but seems less likely given imaging.     Plan:   -EGD today with Dr Alvarez  -Continue PPI IV BID.   -Add on lipase to morning labs  -Follow hgb and stool output  -Continue to follow LFTs  -Antibiotics per IM  -GI following    Mark Wilkinson PA-C  [facility]  Cell:  [phone] Monday through Friday [id number]  Office: [phone]

## 2019-03-21 NOTE — ANESTHESIA PREPROCEDURE EVALUATION
Anesthesia Pre-Procedure Evaluation    Patient: Sanjeev Blackmon   MRN: [id number] : 1955          Preoperative Diagnosis: Abdominal pain    Procedure(s):  COMBINED ESOPHAGOSCOPY, GASTROSCOPY, DUODENOSCOPY (EGD)    Past Medical History:   Diagnosis Date     Nasal polyps 2012     Past Surgical History:   Procedure Laterality Date     ABLATE VEIN VARICOSE RADIO FREQUENCY WITH PHLEBECTOMY MULTIPLE STAB  2011    Dr. Do     Scleral lesion excision       SINUS SURGERY       SINUS SURGERY       SINUS SURGERY       SINUS SURGERY      Dr. Samson       Anesthesia Evaluation     .             ROS/MED HX    ENT/Pulmonary: Comment: Increased oxygen requirements( increased to 15 L via face gage) overnight with sats 88 % to 94%    Nasal polyps      Neurologic:       Cardiovascular: Comment: Episode of SVT( afib vs aflutter) with RVR last evening treated with amiodarone  And heparin drips        METS/Exercise Tolerance:     Hematologic:         Musculoskeletal:         GI/Hepatic: Comment: Admitted with sepsis and CT finding of retroperitoneal and perigastric anemia    Abdominal pain improving, passing flatus        Renal/Genitourinary:         Endo:         Psychiatric:         Infectious Disease:         Malignancy:         Other:                          Physical Exam  Normal systems: dental    Airway   Mallampati: II    Dental     Cardiovascular   Rhythm and rate: irregular and abnormal      Pulmonary    breath sounds clear to auscultation            Lab Results   Component Value Date    WBC 14.6 (H) 2019    HGB 11.8 (L) 2019    HCT 36.0 (L) 2019     (L) 2019     2019    POTASSIUM 3.7 2019    CHLORIDE 114 (H) 2019    CO2 20 2019    BUN 32 (H) 2019    CR 1.28 (H) 2019     (H) 2019    LEANNA 7.9 (L) 2019    MAG 1.8 2019    ALBUMIN 1.9 (L) 2019    PROTTOTAL 6.1 (L) 2019    ALT 17  "03/21/2019    AST 22 03/21/2019    ALKPHOS 41 03/21/2019    BILITOTAL 1.0 03/21/2019    LIPASE 94 03/21/2019    INR 1.39 (H) 03/21/2019       Preop Vitals  BP Readings from Last 3 Encounters:   03/21/19 101/80   02/27/15 118/78   12/17/12 134/80    Pulse Readings from Last 3 Encounters:   03/21/19 147   02/27/15 68      Resp Readings from Last 3 Encounters:   03/21/19 22    SpO2 Readings from Last 3 Encounters:   03/21/19 96%      Temp Readings from Last 1 Encounters:   03/21/19 37.1  C (98.8  F) (Oral)    Ht Readings from Last 1 Encounters:   03/19/19 1.753 m (5' 9\")      Wt Readings from Last 1 Encounters:   03/20/19 93.2 kg (205 lb 8 oz)    Estimated body mass index is 30.35 kg/m  as calculated from the following:    Height as of this encounter: 1.753 m (5' 9\").    Weight as of this encounter: 93.2 kg (205 lb 8 oz).       Anesthesia Plan      History & Physical Review  History and physical reviewed and following examination; no interval change.    ASA Status:  3 .    NPO Status:  > 8 hours    Plan for General, RSI and ETT with Intravenous and Propofol induction.     Heart rate variable from 110-130s on amiodarone-sats better now in 93-95% on 2 liters fm      Postoperative Care      Consents  Anesthetic plan, risks, benefits and alternatives discussed with:  Patient..                 Nael Pryor MD  "

## 2019-03-21 NOTE — PROGRESS NOTES
Timur's test performed prior to radial ABG draw. Collateral circulation confirmed.     Recent Labs   Lab 03/21/19  0215   PH 7.36   PCO2 37   PO2 62*   HCO3 21   O2PER 15

## 2019-03-21 NOTE — PLAN OF CARE
A+Ox4  BP soft, tachycardic, O2 stable on 12L oxymask. Afebrile. RRT called at 0130 for SVT. Placed on Bipap for increased respiratory demand, given mag, fluid bolues, and adenosine with no result. Placed on amio and heparin gtt as well, still tachycardic, metoprolol given w/ slight decrease in HR, see geeta irizarry's note for details. LS diminished. BS active, flatus+, BM+. Jordan w/ adequate UOP. Dilaudid given x1 for pain. Up w/ 1. Abdomen rounded, distended and tender to touch. NPO ex ice.

## 2019-03-21 NOTE — PLAN OF CARE
Pt. A & O x 4.  Amio running at 0.5ml/hr.  Bolus given of 150ml.  BP soft, HR Tachy.  Afebrile.  94% on 2L O2.  Telemetry:  Sinus Tachy.  BS+, Flatus+, BM+++. Voiding via falk adequately.  SBA up to commode for BM.  LS clear, but diminished.

## 2019-03-21 NOTE — CONSULTS
INPATIENT CARDIOLOGY CONSULTATION  [facility]   DATE OF ADMISSION: 2019.   Consult Date:  2019.      REFERRAL SOURCE:  Dr. Parag Alvarenga MD, Hospitalist Service.      REASON FOR REFERRAL:  Atrial fibrillation with rapid ventricular rates in the setting of sepsis with unknown source.      HISTORY OF PRESENT ILLNESS:    Mr. Sanjeev Blackmon was accompanied by his wife, and other members of the family when I visited him in his hospital room.  He is a very pleasant 63-year-old non-obese  gentleman, a retired  from [employer], who still works part-time teaching, without any previous known medical diagnoses (was not taking any prescription medications prior to this hospitalization).  He is physically quite active and is a lifelong never tobacco user.      He was admitted 2 days ago on 2019, after developing sudden onset abdominal pain, recurrent nausea, high-grade fever, and is being treated for sepsis/infection of unknown source.  Gallbladder issue or pancreatitis has been ruled out.  He has had significant improvement over the last 24 hours and apart from obvious abdominal discomfort, he was mentally alert and was able to speak without cyanosis or dyspnea.  He is requiring about 3-4 liters oxygen supplementation.      Cardiology is being consulted because yesterday night, an RRT was called due to development of AFib with RVR of up to 170 BPM.  Indeed, on his telemetry monitor today, he is still in atrial fibrillation with RVR with labile rates of 140-160 BPM.  He has no awareness of this and denies palpitations, dyspnea or chest pain.  He has no prior cardiac history, clinical suspicion of sleep apnea is low, and he is a social alcohol consumer, no family history of atrial arrhythmias or sudden death.  His father (nonsmoker)  of a heart attack in his mid-70s.      The patient was started on IV amiodarone infusion and is currently getting 0.5 mg per  hour, but this was not preceded by a bolus.     DIAGNOSTIC DATA:  Labs:  Sodium 142, potassium 3.7, BUN 32, creatinine 1.2.  He is getting IV normal saline.  Serum lipase 94, serial troponins are borderline elevated at 0.039 and 0.042 (nonsignificant).  Normal liver enzymes.  Hemoglobin 11.8, leukocytes 14.6.      I reviewed his ECGs from yesterday and today and they confirm atrial fibrillation with a rapid ventricular rate of 150-160 BPM.      He has not had prior cardiac imaging.      A V/Q scan showed low probability of pulmonary embolus and his admission chest x-ray showed no pulmonary edema or consolidation.      PHYSICAL EXAMINATION:   VITAL SIGNS:  BP 94/80 (asymptomatic), pulse irregularly irregular with rates varying from   140-160 BPM, respiratory rate 24 per minute, sats 98% on 6 liters oxygen.  BMI 30.3 kg/m2.   GENERAL:  Pertinent for a gentleman with supplemental oxygen, who is mentally alert, has mild dyspnea.  No cyanosis.   CARDIOVASCULAR:  Normal jugular venous pressure.  He is sitting propped up.  Apical impulse is not palpable in this position.  Heart sounds are tachycardic.  No audible murmur or friction rub.   LUNGS:  Clear to auscultation bilaterally.   ABDOMEN:  Tender and distended.  I did not do a detailed examination to avoid patient discomfort.   NEUROPSYCHIATRIC:  Normal.   EXTREMITIES:  No edema.   MUSCULOSKELETAL:  Normal.      DIAGNOSES:   1.  Severe sepsis from intra-abdominal source, unclear etiology.  The patient is improving.   2.  Atrial fibrillation with rapid ventricular rates, secondary to #1.     PLAN:  1. I recommend continuing amiodarone infusion, but give a bolus of 150 mg (I have ordered this).  We really do not have the option of using other AV ravin-blocking agents such as metoprolol or Cardizem, as his blood pressure is in the 90s systolic and he is being treated for sepsis.  I suspect the atrial fibrillation was triggered by his severe systemic illness, and as this  improves, the heart rate will start to settle down.  It might take another 24-48 hours. Fortunately, he is mentating well, has maintained urine output and renal function is satisfactory, fevers are coming down, so hopefully his clinical status will trend in the right direction.   2.  In the interim, if the planned upper GI endoscopy can be postponed until tomorrow, it would be advisable.  He is too tachycardic and has borderline hypotension at present.   3.  I would recommend deferring the transthoracic echocardiogram until tomorrow when his heart rates are better controlled.  Of course, if there is acute clinical deterioration, we should get stat echocardiogram.      Cardiology will follow.  Thank you for consulting us.         DARLENE LOUISE MD             D: 2019   T: 2019   MT: REG      Name:     TIM ROCK   MRN:      [id number]        Account:       [id number]   :      1955           Consult Date:  2019      Document: [id number]

## 2019-03-21 NOTE — CONSULTS
Inpatient Cardiology Consultation   [facility]  Date of Admission:3/19/2019  Date of consult: 3/21/2019    Inpatient cardiology consultation note has been dictated. Dictation number [id number]        Anita Barros MD [facility]  Cardiology        REVIEW OF SYSTEMS:  A comprehensive 10 point review of systems was completed and the pertinent positives are documented in history of present illness.    MEDICATIONS:  Prior to Admission Medications   Prescriptions Last Dose Informant Patient Reported? Taking?   hypromellose-dextran (ARTIFICAL TEARS) 0.1-0.3 % ophthalmic solution prn med  Yes Yes   Sig: as needed for dry eyes   ibuprofen (ADVIL/MOTRIN) 200 MG tablet prn med  Yes Yes   Sig: Take by mouth as needed for mild pain   oxymetazoline (AFRIN) 0.05 % nasal spray prn med  Yes Yes   Sig: Spray into both nostrils as needed for congestion   pseudoePHEDrine (SUDAFED) 30 MG tablet prn med  Yes Yes   Sig: Take by mouth as needed for congestion      Facility-Administered Medications: None       ALLERGIES:  No Known Allergies    PAST MEDICAL HISTORY:  Past Medical History:   Diagnosis Date     Nasal polyps 12/17/2012       PAST SURGICAL HISTORY:  Past Surgical History:   Procedure Laterality Date     ABLATE VEIN VARICOSE RADIO FREQUENCY WITH PHLEBECTOMY MULTIPLE STAB  2011    Dr. Do     Scleral lesion excision  2006     SINUS SURGERY  1979     SINUS SURGERY  1989     SINUS SURGERY  1999     SINUS SURGERY  2010    Dr. Samson       SOCIAL HISTORY:   Sanjeev Blackmon  reports that  has never smoked. he has never used smokeless tobacco. He reports that he drinks about 2.0 - 3.0 oz of alcohol per week.    FAMILY HISTORY:  Family History   Problem Relation Age of Onset     Coronary Artery Disease Father        PHYSICAL EXAMINATION:  Temp: 98.8  F (37.1  C) Temp src: Oral BP: 94/80 Pulse: 125 Heart Rate: 123 Resp: 24 SpO2: 98 % O2 Device: Oxymask Oxygen Delivery: 6 LPM  03/16 0700 - 03/21 0659  In: 7545.42  [P.O.:410; I.V.:5135.42]  Out: 2050 [Urine:2050]  Net: 5495.42  Vitals:    03/19/19 1423 03/20/19 0542   Weight: 86.2 kg (190 lb) 93.2 kg (205 lb 8 oz)

## 2019-03-21 NOTE — CODE/RAPID RESPONSE
[facility]    House BIMAL RRT Note  3/21/2019   Time Called: 0139    RRT called for: HR 170s    Assessment & Plan     Supraventricular tachycardia with underlying atrial fibrillation vs ? atrial flutter with rapid ventricular response.  Acute hypoxia possibly 2/2 PE vs hypoventilation in setting of distended abdomen/acute abdominal pain.   Alternatively considered ACS, PTX, CHF exacerbation, COPD, PNA, narcotics  - Upon arrival, pt lying in bed, awake, alert, in no apparent distress.  Per nursing, pt's telemetry had been noting NSR/ST HR 90s-low 100s; however, pt's HR acutely jumped to 150s-170s.  Pt's SBP 100s-110s upon arrival.  Pt's RR 20s, O2 sats mid 70s while on 6L O2 via oxymask.  Pt now on 15L O2 via oxymask with O2 sat ~ 88-94%.  Stat EKG completed noting SVT; adenosine attempted with minimal response noted, appears to be underlying atrial fibrillation.  No history of a-fib, will initiate pt on amiodarone.  Pt's SBP soft, 90s, with noted HR 120s-130s at time of leaving the unit.  Pt's mentation remains intact, no reports of chest pain or SOB, making adequate urine output noted via falk catheter.        Considered stat CT PE; however, given BRANDEN with recent dye load x2, will start pt on heparin now and obtain VQ scan in AM after discussion with Dr. Paul.  Noted pt with recent negative BLE US for DVT.       INTERVENTIONS:  - Normal saline 1L bolus over 1 hour; an additional 500 ml NS bolus now  - Stat EKG  - Attempted vagal maneuvers without success  - Stat BMP, Mg, trop, lipase, INR  - Will trend troponin x3  - Stat ABG  - Bipap  - BG - 68; 1/2 amp D50 > 99  - Adenosine 6 mg IV now (please see paper chart for continuous EKG strip during adenosine administration)  - Mg 2 g IV now  - General surgery updated given worsening status; no emergent need for surgery at this time, will initiate heparin, pharmacy to dose.   - Amiodarone bolus and infusion  - VQ scan  - Metoprolol 2.5 mg IV now  - Updated  pt's wife per pt's request    At the end of the RRT pt resting in bed, noted slightly improved HR, now 120s-130s, SBP remains 90s, continues on Bipap, in no apparent respiratory distress.    Discussed with and defer further cares to nursing, Dr. Lazaro, oncall surgery, and Dr. Paul, cross covering hospitalist.    Interval History     Sanjeev Blackmon is a 63 year old male who was admitted on 3/19/2019 for epigastric pain, N/V, fevers, hypotension, hypoxia.    Medical history significant for: No PMH    Code Status: Full Code    Allergies   No Known Allergies    Physical Exam   Vital Signs with Ranges:  Temp:  [97.6  F (36.4  C)-99.7  F (37.6  C)] 97.6  F (36.4  C)  Pulse:  [102-130] 102  Heart Rate:  [] 101  Resp:  [13-37] 14  BP: ()/(68-84) 120/78  SpO2:  [89 %-99 %] 91 %  I/O last 3 completed shifts:  In: 4960 [P.O.:390; I.V.:2570; IV Piggyback:2000]  Out: 1825 [Urine:1825]    Constitutional: Pt lying in bed, awake, alert, in no apparent distress  Pulmonary: In no apparent respiratory distress, clear to auscultation bilaterally, shallow respirations, no crackles or wheezes noted  Cardiovascular: Tachycardic, regular rate and rhythm, normal S1S2, no murmur, rub or gallop noted  GI: Round, distended, mild tenderness to palpation most notably in RLQ, no obvious guarding or rebound tenderness noted, hypoactive bowel sounds  Skin/Integumen: Warm, dry  Neuro: A/Ox4, clear speech  Psych:  Calm  Extremities: Moves all extremities, no peripheral edema noted    Data     EKG:  Interpreted by RODOLFO Kemp CNP  Time reviewed: 0150  Symptoms at time of EKG: None   Rhythm: supraventricular tachycardia  Rate: >160  Axis: Normal  Ectopy: none  Conduction: normal  ST Segments/ T Waves: T wave inversion III and aVF  Q Waves: none  Comparison to prior: Now SVT, previously sinus tachycardia 3/19/19    Clinical Impression: supraventricular tachycardia    IMAGING: (X-ray/CT/MRI)   Recent Results (from the past 24  hour(s))   NM HepatOBiliary Scan    Narrative    NUCLEAR MEDICINE HEPATOBILIARY SCAN 3/20/2019 2:31 PM    HISTORY: Right upper quadrant pain, cholecystitis suspected. Rule out  cholecystitis.    COMPARISON: 3/19/2019 right upper quadrant ultrasound    TECHNIQUE: 6.3 mCi Tc-99m Mebrofenin injected RAC IV.    FINDINGS: There is a tiny photopenic area in the right lobe of the  liver which does not appear to correspond with the gallbladder. There  is no corresponding liver lesion on 3/19/2019 CT abdomen and pelvis.  Activity in the gallbladder identified beginning at 38 minutes. At 58  minutes the gallbladder is still fairly small but demonstrates  activity. Duodenal activity by 16 minutes and small bowel activity by  48 minutes.      Impression    IMPRESSION: No evidence for acute cholecystitis with visualization of  gallbladder and bowel activity before 60 minutes.    JERRY MASON MD   CT Abdomen Pelvis w Contrast    Narrative    CT ABDOMEN AND PELVIS WITH CONTRAST  3/20/2019 7:36 PM    HISTORY: Abdominal distension. Evaluate for free air, obstruction,  ileus.    COMPARISON: A CT on 3/19/2019.    TECHNIQUE: Routine transverse CT imaging of the abdomen and pelvis was  performed following the uneventful administration of 90 mL Isovue-370  intravenous contrast. Radiation dose for this scan was reduced using  automated exposure control, adjustment of the mA and/or kV according  to patient size, or iterative reconstruction technique.      Impression    IMPRESSION: There has been interval placement of a catheter into the  bladder. There has been progression of mild to moderate infiltrates of  both lung bases with small bilateral pleural effusions. There has been  development of a small amount of free fluid in the pelvis. No other  change is demonstrated. This includes mild inflammatory changes of the  upper abdomen and retroperitoneum as well as colonic diverticulosis  with no evidence of diverticulitis or obstruction.  No free  intraperitoneal air is seen.    ALCON CARRERO MD     CBC with Diff:  Recent Labs   Lab Test 03/21/19  0628 03/21/19  0143   WBC 14.6*  --    HGB 11.8*  --    MCV 91  --    *  --    INR  --  1.39*      Recent Labs   Lab 03/21/19  0628 03/21/19  0143 03/19/19  1450   TROPI 0.039  Canceled, Test credited 0.069* <0.015     Lactic Acid:    Lab Results   Component Value Date    LACT 1.5 03/21/2019         Comprehensive Metabolic Panel:  Recent Labs   Lab 03/21/19  0628 03/21/19  0143    142   POTASSIUM 3.7 4.1   CHLORIDE 114* 111*   CO2 20 24   ANIONGAP 8 7   * 78   BUN 32* 35*   CR 1.28* 1.50*   GFRESTIMATED 58* 48*   GFRESTBLACK 67 56*   LEANNA 7.9* 8.1*   MAG  --  1.8   PROTTOTAL 6.1*  --    ALBUMIN 1.9*  --    BILITOTAL 1.0  --    ALKPHOS 41  --    AST 22  --    ALT 17  --      INR:    Recent Labs   Lab Test 03/21/19  0143   INR 1.39*     Time Spent on this Encounter   I spent 45 minutes of critical care time on the unit/floor managing the care of Sanjeev Blackmon. Upon evaluation, this patient had a high probability of imminent or life-threatening deterioration due to acute arrhythmia with noted hypotension, which required my direct attention, intervention, and personal management. 100% of my time was spent at the bedside counseling the patient and/or coordinating care regarding services listed in this note.    RODOLFO Kemp [facility] BIMAL

## 2019-03-22 ENCOUNTER — APPOINTMENT (OUTPATIENT)
Dept: GENERAL RADIOLOGY | Facility: CLINIC | Age: 64
DRG: 871 | End: 2019-03-22
Attending: INTERNAL MEDICINE
Payer: COMMERCIAL

## 2019-03-22 PROBLEM — I48.92 ATRIAL FLUTTER WITH RAPID VENTRICULAR RESPONSE (H): Status: ACTIVE | Noted: 2019-03-22

## 2019-03-22 PROBLEM — K21.00 GASTROESOPHAGEAL REFLUX DISEASE WITH ESOPHAGITIS: Status: ACTIVE | Noted: 2019-03-22

## 2019-03-22 LAB
ANION GAP SERPL CALCULATED.3IONS-SCNC: 5 MMOL/L (ref 3–14)
BUN SERPL-MCNC: 29 MG/DL (ref 7–30)
CALCIUM SERPL-MCNC: 8 MG/DL (ref 8.5–10.1)
CHLORIDE SERPL-SCNC: 112 MMOL/L (ref 94–109)
CO2 SERPL-SCNC: 23 MMOL/L (ref 20–32)
CREAT SERPL-MCNC: 1.23 MG/DL (ref 0.66–1.25)
ERYTHROCYTE [DISTWIDTH] IN BLOOD BY AUTOMATED COUNT: 14.6 % (ref 10–15)
GFR SERPL CREATININE-BSD FRML MDRD: 61 ML/MIN/{1.73_M2}
GLUCOSE SERPL-MCNC: 92 MG/DL (ref 70–99)
HCT VFR BLD AUTO: 35.5 % (ref 40–53)
HGB BLD-MCNC: 11.6 G/DL (ref 13.3–17.7)
LACTATE BLD-SCNC: 1 MMOL/L (ref 0.7–2)
LACTATE BLD-SCNC: 1 MMOL/L (ref 0.7–2)
LACTATE BLD-SCNC: 1.1 MMOL/L (ref 0.7–2)
LACTATE BLD-SCNC: 1.5 MMOL/L (ref 0.7–2)
MCH RBC QN AUTO: 29.4 PG (ref 26.5–33)
MCHC RBC AUTO-ENTMCNC: 32.7 G/DL (ref 31.5–36.5)
MCV RBC AUTO: 90 FL (ref 78–100)
PLATELET # BLD AUTO: 136 10E9/L (ref 150–450)
POTASSIUM SERPL-SCNC: 3.6 MMOL/L (ref 3.4–5.3)
RBC # BLD AUTO: 3.94 10E12/L (ref 4.4–5.9)
SODIUM SERPL-SCNC: 140 MMOL/L (ref 133–144)
UPPER GI ENDOSCOPY: NORMAL
WBC # BLD AUTO: 15 10E9/L (ref 4–11)

## 2019-03-22 PROCEDURE — 40000886 ZZH STATISTIC STEP DOWN HRS DAY

## 2019-03-22 PROCEDURE — 74018 RADEX ABDOMEN 1 VIEW: CPT

## 2019-03-22 PROCEDURE — 12000000 ZZH R&B MED SURG/OB

## 2019-03-22 PROCEDURE — 99232 SBSQ HOSP IP/OBS MODERATE 35: CPT | Performed by: INTERNAL MEDICINE

## 2019-03-22 PROCEDURE — 25000128 H RX IP 250 OP 636: Performed by: INTERNAL MEDICINE

## 2019-03-22 PROCEDURE — 25000566 ZZH SEVOFLURANE, EA 15 MIN: Performed by: INTERNAL MEDICINE

## 2019-03-22 PROCEDURE — 25000132 ZZH RX MED GY IP 250 OP 250 PS 637: Performed by: INTERNAL MEDICINE

## 2019-03-22 PROCEDURE — 85027 COMPLETE CBC AUTOMATED: CPT | Performed by: HOSPITALIST

## 2019-03-22 PROCEDURE — 40000275 ZZH STATISTIC RCP TIME EA 10 MIN

## 2019-03-22 PROCEDURE — 25800030 ZZH RX IP 258 OP 636: Performed by: NURSE ANESTHETIST, CERTIFIED REGISTERED

## 2019-03-22 PROCEDURE — 93010 ELECTROCARDIOGRAM REPORT: CPT | Performed by: INTERNAL MEDICINE

## 2019-03-22 PROCEDURE — 83605 ASSAY OF LACTIC ACID: CPT | Performed by: INTERNAL MEDICINE

## 2019-03-22 PROCEDURE — 25000128 H RX IP 250 OP 636: Performed by: NURSE PRACTITIONER

## 2019-03-22 PROCEDURE — 25000125 ZZHC RX 250: Performed by: NURSE ANESTHETIST, CERTIFIED REGISTERED

## 2019-03-22 PROCEDURE — 0DJ08ZZ INSPECTION OF UPPER INTESTINAL TRACT, VIA NATURAL OR ARTIFICIAL OPENING ENDOSCOPIC: ICD-10-PCS | Performed by: INTERNAL MEDICINE

## 2019-03-22 PROCEDURE — A9270 NON-COVERED ITEM OR SERVICE: HCPCS | Performed by: INTERNAL MEDICINE

## 2019-03-22 PROCEDURE — 25000128 H RX IP 250 OP 636: Performed by: ANESTHESIOLOGY

## 2019-03-22 PROCEDURE — 99233 SBSQ HOSP IP/OBS HIGH 50: CPT | Performed by: INTERNAL MEDICINE

## 2019-03-22 PROCEDURE — 99231 SBSQ HOSP IP/OBS SF/LOW 25: CPT | Performed by: SURGERY

## 2019-03-22 PROCEDURE — 80048 BASIC METABOLIC PNL TOTAL CA: CPT | Performed by: HOSPITALIST

## 2019-03-22 PROCEDURE — 36415 COLL VENOUS BLD VENIPUNCTURE: CPT | Performed by: HOSPITALIST

## 2019-03-22 PROCEDURE — 25800030 ZZH RX IP 258 OP 636: Performed by: INTERNAL MEDICINE

## 2019-03-22 PROCEDURE — 43235 EGD DIAGNOSTIC BRUSH WASH: CPT | Performed by: INTERNAL MEDICINE

## 2019-03-22 PROCEDURE — 25800030 ZZH RX IP 258 OP 636: Performed by: NURSE PRACTITIONER

## 2019-03-22 PROCEDURE — 94660 CPAP INITIATION&MGMT: CPT

## 2019-03-22 PROCEDURE — P9041 ALBUMIN (HUMAN),5%, 50ML: HCPCS | Performed by: ANESTHESIOLOGY

## 2019-03-22 PROCEDURE — 37000009 ZZH ANESTHESIA TECHNICAL FEE, EACH ADDTL 15 MIN: Performed by: INTERNAL MEDICINE

## 2019-03-22 PROCEDURE — 27210339 ZZH CIRCUIT HUMIDITY W/CPAP BIP

## 2019-03-22 PROCEDURE — 37000008 ZZH ANESTHESIA TECHNICAL FEE, 1ST 30 MIN: Performed by: INTERNAL MEDICINE

## 2019-03-22 PROCEDURE — 27210338 ZZH CIRCUIT HUMID FACE/TRACH MSK

## 2019-03-22 PROCEDURE — P9041 ALBUMIN (HUMAN),5%, 50ML: HCPCS | Performed by: NURSE ANESTHETIST, CERTIFIED REGISTERED

## 2019-03-22 PROCEDURE — 93005 ELECTROCARDIOGRAM TRACING: CPT

## 2019-03-22 PROCEDURE — 36415 COLL VENOUS BLD VENIPUNCTURE: CPT | Performed by: INTERNAL MEDICINE

## 2019-03-22 PROCEDURE — 25000128 H RX IP 250 OP 636: Performed by: NURSE ANESTHETIST, CERTIFIED REGISTERED

## 2019-03-22 PROCEDURE — 40000010 ZZH STATISTIC ANES STAT CODE-CRNA PER MINUTE: Performed by: INTERNAL MEDICINE

## 2019-03-22 RX ORDER — PROPOFOL 10 MG/ML
INJECTION, EMULSION INTRAVENOUS PRN
Status: DISCONTINUED | OUTPATIENT
Start: 2019-03-22 | End: 2019-03-22

## 2019-03-22 RX ORDER — ALBUMIN, HUMAN INJ 5% 5 %
12.5 SOLUTION INTRAVENOUS EVERY 10 MIN PRN
Status: DISCONTINUED | OUTPATIENT
Start: 2019-03-22 | End: 2019-03-22 | Stop reason: HOSPADM

## 2019-03-22 RX ORDER — ALBUMIN, HUMAN INJ 5% 5 %
SOLUTION INTRAVENOUS CONTINUOUS PRN
Status: DISCONTINUED | OUTPATIENT
Start: 2019-03-22 | End: 2019-03-22

## 2019-03-22 RX ORDER — METOPROLOL TARTRATE 25 MG/1
25 TABLET, FILM COATED ORAL 2 TIMES DAILY
Status: DISCONTINUED | OUTPATIENT
Start: 2019-03-22 | End: 2019-03-22

## 2019-03-22 RX ORDER — SODIUM CHLORIDE, SODIUM LACTATE, POTASSIUM CHLORIDE, CALCIUM CHLORIDE 600; 310; 30; 20 MG/100ML; MG/100ML; MG/100ML; MG/100ML
INJECTION, SOLUTION INTRAVENOUS CONTINUOUS PRN
Status: DISCONTINUED | OUTPATIENT
Start: 2019-03-22 | End: 2019-03-22

## 2019-03-22 RX ORDER — NALOXONE HYDROCHLORIDE 0.4 MG/ML
.1-.4 INJECTION, SOLUTION INTRAMUSCULAR; INTRAVENOUS; SUBCUTANEOUS
Status: ACTIVE | OUTPATIENT
Start: 2019-03-22 | End: 2019-03-23

## 2019-03-22 RX ORDER — METOPROLOL TARTRATE 50 MG
50 TABLET ORAL 2 TIMES DAILY
Status: DISCONTINUED | OUTPATIENT
Start: 2019-03-22 | End: 2019-03-23

## 2019-03-22 RX ORDER — LIDOCAINE 40 MG/G
CREAM TOPICAL
Status: DISCONTINUED | OUTPATIENT
Start: 2019-03-22 | End: 2019-03-22 | Stop reason: HOSPADM

## 2019-03-22 RX ORDER — FLUMAZENIL 0.1 MG/ML
0.2 INJECTION, SOLUTION INTRAVENOUS
Status: ACTIVE | OUTPATIENT
Start: 2019-03-22 | End: 2019-03-23

## 2019-03-22 RX ORDER — ONDANSETRON 2 MG/ML
INJECTION INTRAMUSCULAR; INTRAVENOUS PRN
Status: DISCONTINUED | OUTPATIENT
Start: 2019-03-22 | End: 2019-03-22

## 2019-03-22 RX ORDER — PANTOPRAZOLE SODIUM 40 MG/1
40 TABLET, DELAYED RELEASE ORAL
Status: DISCONTINUED | OUTPATIENT
Start: 2019-03-23 | End: 2019-03-28 | Stop reason: HOSPADM

## 2019-03-22 RX ORDER — LIDOCAINE HYDROCHLORIDE 20 MG/ML
INJECTION, SOLUTION INFILTRATION; PERINEURAL PRN
Status: DISCONTINUED | OUTPATIENT
Start: 2019-03-22 | End: 2019-03-22

## 2019-03-22 RX ORDER — ESMOLOL HYDROCHLORIDE 10 MG/ML
INJECTION INTRAVENOUS PRN
Status: DISCONTINUED | OUTPATIENT
Start: 2019-03-22 | End: 2019-03-22

## 2019-03-22 RX ADMIN — PHENYLEPHRINE HYDROCHLORIDE 100 MCG: 10 INJECTION, SOLUTION INTRAMUSCULAR; INTRAVENOUS; SUBCUTANEOUS at 09:41

## 2019-03-22 RX ADMIN — AMIODARONE HYDROCHLORIDE 0.5 MG/MIN: 50 INJECTION, SOLUTION INTRAVENOUS at 19:57

## 2019-03-22 RX ADMIN — ALBUMIN HUMAN: 0.05 INJECTION, SOLUTION INTRAVENOUS at 09:49

## 2019-03-22 RX ADMIN — PHENYLEPHRINE HYDROCHLORIDE 100 MCG: 10 INJECTION, SOLUTION INTRAMUSCULAR; INTRAVENOUS; SUBCUTANEOUS at 09:57

## 2019-03-22 RX ADMIN — LIDOCAINE HYDROCHLORIDE 80 MG: 20 INJECTION, SOLUTION INFILTRATION; PERINEURAL at 09:40

## 2019-03-22 RX ADMIN — PHENYLEPHRINE HYDROCHLORIDE 100 MCG: 10 INJECTION, SOLUTION INTRAMUSCULAR; INTRAVENOUS; SUBCUTANEOUS at 09:49

## 2019-03-22 RX ADMIN — PIPERACILLIN SODIUM,TAZOBACTAM SODIUM 3.38 G: 3; .375 INJECTION, POWDER, FOR SOLUTION INTRAVENOUS at 12:16

## 2019-03-22 RX ADMIN — OXYCODONE HYDROCHLORIDE 5 MG: 5 TABLET ORAL at 03:12

## 2019-03-22 RX ADMIN — PHENYLEPHRINE HYDROCHLORIDE 100 MCG: 10 INJECTION, SOLUTION INTRAMUSCULAR; INTRAVENOUS; SUBCUTANEOUS at 09:55

## 2019-03-22 RX ADMIN — METOPROLOL TARTRATE 50 MG: 50 TABLET ORAL at 21:14

## 2019-03-22 RX ADMIN — AMIODARONE HYDROCHLORIDE 0.5 MG/MIN: 50 INJECTION, SOLUTION INTRAVENOUS at 01:36

## 2019-03-22 RX ADMIN — SODIUM CHLORIDE: 9 INJECTION, SOLUTION INTRAVENOUS at 23:16

## 2019-03-22 RX ADMIN — SUCCINYLCHOLINE CHLORIDE 100 MG: 20 INJECTION, SOLUTION INTRAMUSCULAR; INTRAVENOUS; PARENTERAL at 09:40

## 2019-03-22 RX ADMIN — ESMOLOL HYDROCHLORIDE 20 MG: 10 INJECTION, SOLUTION INTRAVENOUS at 09:47

## 2019-03-22 RX ADMIN — MIDAZOLAM 1 MG: 1 INJECTION INTRAMUSCULAR; INTRAVENOUS at 09:37

## 2019-03-22 RX ADMIN — PIPERACILLIN SODIUM,TAZOBACTAM SODIUM 3.38 G: 3; .375 INJECTION, POWDER, FOR SOLUTION INTRAVENOUS at 19:14

## 2019-03-22 RX ADMIN — PHENYLEPHRINE HYDROCHLORIDE 100 MCG: 10 INJECTION, SOLUTION INTRAMUSCULAR; INTRAVENOUS; SUBCUTANEOUS at 09:53

## 2019-03-22 RX ADMIN — PIPERACILLIN SODIUM,TAZOBACTAM SODIUM 3.38 G: 3; .375 INJECTION, POWDER, FOR SOLUTION INTRAVENOUS at 03:41

## 2019-03-22 RX ADMIN — ALBUMIN HUMAN: 0.05 INJECTION, SOLUTION INTRAVENOUS at 09:57

## 2019-03-22 RX ADMIN — SODIUM CHLORIDE: 9 INJECTION, SOLUTION INTRAVENOUS at 12:16

## 2019-03-22 RX ADMIN — SODIUM CHLORIDE, POTASSIUM CHLORIDE, SODIUM LACTATE AND CALCIUM CHLORIDE: 600; 310; 30; 20 INJECTION, SOLUTION INTRAVENOUS at 09:36

## 2019-03-22 RX ADMIN — PHENYLEPHRINE HYDROCHLORIDE 100 MCG: 10 INJECTION, SOLUTION INTRAMUSCULAR; INTRAVENOUS; SUBCUTANEOUS at 09:51

## 2019-03-22 RX ADMIN — PHENYLEPHRINE HYDROCHLORIDE 100 MCG: 10 INJECTION, SOLUTION INTRAMUSCULAR; INTRAVENOUS; SUBCUTANEOUS at 10:02

## 2019-03-22 RX ADMIN — AMIODARONE HYDROCHLORIDE 0.5 MG/MIN: 50 INJECTION, SOLUTION INTRAVENOUS at 11:09

## 2019-03-22 RX ADMIN — ONDANSETRON 4 MG: 2 INJECTION INTRAMUSCULAR; INTRAVENOUS at 09:57

## 2019-03-22 RX ADMIN — PHENYLEPHRINE HYDROCHLORIDE 100 MCG: 10 INJECTION, SOLUTION INTRAMUSCULAR; INTRAVENOUS; SUBCUTANEOUS at 09:50

## 2019-03-22 RX ADMIN — METOPROLOL TARTRATE 25 MG: 25 TABLET ORAL at 13:49

## 2019-03-22 RX ADMIN — ACETAMINOPHEN 650 MG: 325 TABLET, FILM COATED ORAL at 19:48

## 2019-03-22 RX ADMIN — PHENYLEPHRINE HYDROCHLORIDE 200 MCG: 10 INJECTION, SOLUTION INTRAMUSCULAR; INTRAVENOUS; SUBCUTANEOUS at 09:52

## 2019-03-22 RX ADMIN — ALBUMIN HUMAN 12.5 G: 0.05 INJECTION, SOLUTION INTRAVENOUS at 11:01

## 2019-03-22 RX ADMIN — PHENYLEPHRINE HYDROCHLORIDE 100 MCG: 10 INJECTION, SOLUTION INTRAMUSCULAR; INTRAVENOUS; SUBCUTANEOUS at 09:47

## 2019-03-22 RX ADMIN — MIDAZOLAM 1 MG: 1 INJECTION INTRAMUSCULAR; INTRAVENOUS at 09:41

## 2019-03-22 RX ADMIN — ESMOLOL HYDROCHLORIDE 20 MG: 10 INJECTION, SOLUTION INTRAVENOUS at 09:41

## 2019-03-22 RX ADMIN — PHENYLEPHRINE HYDROCHLORIDE 100 MCG: 10 INJECTION, SOLUTION INTRAMUSCULAR; INTRAVENOUS; SUBCUTANEOUS at 10:00

## 2019-03-22 RX ADMIN — OXYCODONE HYDROCHLORIDE 5 MG: 5 TABLET ORAL at 19:48

## 2019-03-22 RX ADMIN — PHENYLEPHRINE HYDROCHLORIDE 200 MCG: 10 INJECTION, SOLUTION INTRAMUSCULAR; INTRAVENOUS; SUBCUTANEOUS at 09:45

## 2019-03-22 RX ADMIN — OXYCODONE HYDROCHLORIDE 5 MG: 5 TABLET ORAL at 12:57

## 2019-03-22 RX ADMIN — PROPOFOL 100 MG: 10 INJECTION, EMULSION INTRAVENOUS at 09:40

## 2019-03-22 RX ADMIN — ACETAMINOPHEN 650 MG: 325 TABLET, FILM COATED ORAL at 12:57

## 2019-03-22 RX ADMIN — PHENYLEPHRINE HYDROCHLORIDE 100 MCG: 10 INJECTION, SOLUTION INTRAMUSCULAR; INTRAVENOUS; SUBCUTANEOUS at 10:06

## 2019-03-22 ASSESSMENT — MIFFLIN-ST. JEOR: SCORE: 1767.38

## 2019-03-22 ASSESSMENT — ACTIVITIES OF DAILY LIVING (ADL)
ADLS_ACUITY_SCORE: 13

## 2019-03-22 NOTE — PROGRESS NOTES
"General Surgery Progress Note    Subjective: pt reports he feels more distended today. Pain overall has improved. HR improved on amiodarone gtt.     Objective: /82   Pulse 114   Temp 98.8  F (37.1  C) (Oral)   Resp 15   Ht 1.753 m (5' 9\")   Wt 99.2 kg (218 lb 11.1 oz)   SpO2 95%   BMI 32.30 kg/m    Gen: alert, sitting up in chair  CV: RRR  Pulm: nonlabored breathing  Abd: increased distention, mildly tender in epigastrium to palpation  Ext: WWP    Assessment/Plan:   Sanjeev Blackmon  is a 63 year old male admitted with sepsis of unknown origin. EGD today, await results. No further surgical recommendations at this time.     Leny Guerra MD  [facility]  [phone]              "

## 2019-03-22 NOTE — ANESTHESIA CARE TRANSFER NOTE
Patient: Sanjeev Blackmon    Procedure(s):  COMBINED ESOPHAGOSCOPY, GASTROSCOPY, DUODENOSCOPY (EGD)    Diagnosis: Abdominal pain  Diagnosis Additional Information: No value filed.    Anesthesia Type:   General, RSI, ETT     Note:  Airway :Face Mask  Patient transferred to:PACU  Comments: Pt exhibits spontaneous respirations, follows commands, suctioned, extubated, dentition unchanged, exchanging well, transferred to pacu with 10L O2 via mask, all monitors and alarms on, report to [facility] Report: Identifed the Patient, Identified the Reponsible Provider, Reviewed the pertinent medical history, Discussed the surgical course, Reviewed Intra-OP anesthesia mangement and issues during anesthesia, Set expectations for post-procedure period and Allowed opportunity for questions and acknowledgement of understanding      Vitals: (Last set prior to Anesthesia Care Transfer)    CRNA VITALS  3/22/2019 0947 - 3/22/2019 1028      3/22/2019             NIBP:  103/76    Ht Rate:  131    SpO2:  92 %    EKG:  Sinus tachycardia                Electronically Signed By: RODOLFO Dunaway CRNA  March 22, 2019  10:28 AM

## 2019-03-22 NOTE — PLAN OF CARE
A+O AVSS ex tachycardic. on 2L oxymask. LS clear. BS hyperactive, flatus+, BM+. Abdomen distended and tender to touch. Jordan w/ adequate UOP. Amio gtt at 30 ml/hr. Up w/ 1. Tolerating clears

## 2019-03-22 NOTE — PLAN OF CARE
Alert and oriented x4. Vital signs stable on 4L O2 NC. Tele: pj Verdugo. Metoprolol started BID; amio gtt continuing at 30ml/hr. Lung sounds diminished. Bowel sounds active. 1 bowel movement this evening. Passing flatus. Abdomen is distended; has been getting less tender throughout the day. Jordan patent with adequate urine output.Tolerating full liquids. Up stand by assist due to IVs. EGD done today; plan to have ECHO tomorrow-no time as of now. Wife would like to be called with updates regarding abdominal xray results.

## 2019-03-22 NOTE — PROGRESS NOTES
[facility]    Medicine Progress Note - Hospitalist Service       Date of Admission:  3/19/2019  Assessment & Plan   Sanjeev Blackmon is a previously healthy 63 year-old male admitted on 3/19/2019 after presenting with 1 day of dry heaves with epigastrium pain, and fevers to 102F, clinical picture consistent with sepsis most likely due to intra-abdominal pathology based on admission CT findings.        Severe sepsis from intraabdominal source, unclear etiology  Presents with epigastric pain and fevers. CT abdomen/pelvis shows inflammatory changes in upper abdomen retroperitoneum around the gastric antrum, suggestive of pancreatitis (but serial lipase wnl) vs gastritis/gastric ulcer (repeat CT 3/20 without significant change). RUQ U/S shows sludge within the gallbladder with mild wall thickening measuring 0.3 cm, but HIDA scan 3/20 negative for cholecystitis.  Initial lactate of 4.1 trended to normal with IVF and antibiotics.    - Continue piperacillin-tazobactam IV  -  ml/hr  - continue NPO  - Continue pantoprazole IV BID  - Blood cultures NGTD  - afebrile, leukocytosis trending down  - EGD today shows reflux Esophagitis, continue PPI  - GI and Surg recs appreciated (currently no plan for surgery)    Possible A-fib/flutter  Acute increase in heart rate overnight 3/20-3/21 with EKG suggesting SVT.  Received adenosine with minimal response, but underlying rhythm appearing A-fib vs flutter.  - on IV Amiodarone, adjust per cardiology    BRANDEN  Baseline Cr unknown, most recent Cr 1.05 in 2015.  Admission Cr 1.89, trending down with IVF.  - Cr 1.23 today; continue IVF as above and repeat BMP in AM    Acute hypoxic respiratory failure  Possible PNA  Minimal oxygen requirements following admission, needs acutely increased with arrhythmia as above.  V/Q scan 3/21 with low probability of PE.  CT abd/pelvis 3/20 showing moderate bibasilar infiltrates.  - wean oxygen as able  - stopped heparin as low  probability of PE    GERD with HH and Esophagitis   -- continue PPI      Diet: Full Liquid Diet    DVT Prophylaxis: Pneumatic Compression Devices  Jordan Catheter: in place, indication: Strict 1-2 Hour I&O  Code Status: Full Code      Disposition Plan   Expected discharge: in 2 days.  Discussed with surgery (anticipate they will sign off)    Samson Jacobs MD  Hospitalist Service  [facility]    ______________________________________________________________________    Interval History   Reports epigastric pain improved today, currently reporting more diffuse abdominal cramping.  Remains distended, but also improved today, no nausea.  Had large bowel movement.  No fever/chills.  Denies chest pain/pressure, palpitations.  States dyspnea due to inability to take deep breaths due to abd distension.     Data reviewed today: I reviewed all medications, new labs and imaging results over the last 24 hours. I personally reviewed no images or EKG's today.    Physical Exam   Vital Signs: Temp: 98.3  F (36.8  C) Temp src: Temporal BP: 101/82 Pulse: 116 Heart Rate: 122 Resp: 14 SpO2: 94 % O2 Device: Oxymask Oxygen Delivery: 10 LPM  Weight: 218 lbs 11.14 oz    Constitutional: Well developed, well nourished male in NAD  Respiratory: Diminished at bilateral bases, pain in abdomen reported with deep inspiration, no wheezes  Cardiovascular: Tachycardic with regular rhythm. Normal s1/s2.  1+ peripheral edema.  GI: moderately distended and somewhat firm, hypoactive bowel sounds, reports diffuse tenderness with palpation  Skin/Integumen: Warm, dry  Other: alert and appropriate, cranial nerves grossly intact    Data   Recent Labs   Lab 03/22/19  0553 03/21/19  0947 03/21/19  0628 03/21/19  0143  03/20/19  0451   WBC 15.0*  --  14.6*  --   --  15.0*   HGB 11.6*  --  11.8*  --   --  13.1*   MCV 90  --  91  --   --  90   *  --  127*  --   --  129*   INR  --   --   --  1.39*  --   --      --  142 142  --   140   POTASSIUM 3.6  --  3.7 4.1  --  3.8   CHLORIDE 112*  --  114* 111*  --  110*   CO2 23  --  20 24  --  21   BUN 29  --  32* 35*  --  34*   CR 1.23  --  1.28* 1.50*  --  1.65*   ANIONGAP 5  --  8 7  --  9   LEANNA 8.0*  --  7.9* 8.1*  --  7.7*   GLC 92  --  108* 78  --  93   ALBUMIN  --   --  1.9*  --   --  2.2*   PROTTOTAL  --   --  6.1*  --   --  6.2*   BILITOTAL  --   --  1.0  --   --  1.6*   ALKPHOS  --   --  41  --   --  41   ALT  --   --  17  --   --  20   AST  --   --  22  --   --  18   LIPASE  --   --  94 128   < > 332   TROPI  --  0.042 0.039  Canceled, Test credited 0.069*  --   --     < > = values in this interval not displayed.       No results found for this or any previous visit (from the past 24 hour(s)).  Medications     amiodarone 0.5 mg/min (03/22/19 1109)     - MEDICATION INSTRUCTIONS -       sodium chloride 100 mL/hr at 03/22/19 1216       [START ON 3/23/2019] pantoprazole  40 mg Oral QAM AC     piperacillin-tazobactam  3.375 g Intravenous Q6H     sodium chloride (PF)  3 mL Intracatheter Q8H

## 2019-03-22 NOTE — PROGRESS NOTES
[facility].  Inpatient Cardiology Progress Note.  Date of service: 3/22/2019.     INTERVAL HISTORY:  Overall clinical status is improving.  Had an upper GI endoscopy today which was unrevealing (per report from the nurse, procedure report pending).  Patient remains in atrial fibrillation with rapid ventricular rates, that have improved since receiving the 150 mg bolus amiodarone yesterday (infusion continues at 0.5 mg/h) and a single dose of her metoprolol tartrate 25 mg this morning.  He is tolerating clear liquids.    On exam - appears tired, not tachypneic, still requiring supplemental oxygen.  Afebrile, /90 mmHg, heart rate 100-120 bpm, sats 96% on 2 L oxygen.  Irregular heart sounds, no audible murmur, lungs are clear.    Labs - potassium 3.6, creatinine 1.2 with an estimated GFR of 61, still has leukocytosis of 15.0, hemoglobin 11.6.  Non-significant troponin rise (0.039 - 0.042).    Transthoracic echocardiogram - pending.    DIAGNOSES:  1.  Severe sepsis from intra-abdominal source, unclear etiology.  The patient is improving.   2.  New diagnosis of atrial fibrillation with rapid ventricular rates, secondary to #1.  Improved rate control with the IV amiodarone and oral metoprolol.    ASSESSMENT/PLAN:  1.  As his systemic illness is improving, atrial fibrillation rate control has improved.  Continue IV amiodarone infusion at 0.5 mg/h.  2.  Increase metoprolol tartrate to 50 mg twice daily.  Hopefully, he will continue to tolerate oral intake.  3.  He has a low CHADS-VaSc score.  Therefore, anticoagulation is not needed.  4.  Transthoracic echocardiogram to be done tomorrow, when his rate control is improved and he will be able to lay flat.  I have ordered this.  5.  Cardiology will follow.    Anita Barros MD [facility]  Cardiology.

## 2019-03-22 NOTE — ANESTHESIA POSTPROCEDURE EVALUATION
Patient: Sanjeev Blackmon    Procedure(s):  COMBINED ESOPHAGOSCOPY, GASTROSCOPY, DUODENOSCOPY (EGD)    Diagnosis:Abdominal pain  Diagnosis Additional Information: No value filed.    Anesthesia Type:  General, RSI, ETT    Note:  Anesthesia Post Evaluation    Patient location during evaluation: Phase 2  Patient participation: Able to fully participate in evaluation  Level of consciousness: awake and alert  Pain management: adequate  Airway patency: patent  Cardiovascular status: acceptable  Respiratory status: acceptable and unassisted  Hydration status: acceptable  PONV: none             Last vitals:  Vitals:    03/22/19 1130 03/22/19 1140 03/22/19 1257   BP: (!) 106/92 101/82    Pulse: 112 116    Resp: 18 14    Temp:   36.7  C (98  F)   SpO2: 95% 94% 95%         Electronically Signed By: Shelton Aranda MD  March 22, 2019  1:48 PM

## 2019-03-22 NOTE — PLAN OF CARE
Dx: sepsis of unknown cause. Hx: no significant history. VSS ex Tachycardic. On 3L Oxymask. Tele: ST. A/Ox4 Pt had RRT called at 0130 today for SVT with -160. HR at 100 controlled with Amio gtt @ 30mL/hr. CMS intact. Pain controlled with prn Oxy for back and stomach pain. Up with A1/SBA. Tolerating clear liquid diet. Denies N&V. +gas, +bm x2, 1 formed, 1 loose. Voiding adequately to falk. LS clear in upper lobes, diminished in lower lobes. IS: 1250. Plan: EGD scheduled for tomorrow, no NPO status at this time. Cardiovascular recommended echo tomorrow as well. Will continue to monitor.

## 2019-03-23 ENCOUNTER — APPOINTMENT (OUTPATIENT)
Dept: CARDIOLOGY | Facility: CLINIC | Age: 64
DRG: 871 | End: 2019-03-23
Attending: INTERNAL MEDICINE
Payer: COMMERCIAL

## 2019-03-23 PROBLEM — N17.9 AKI (ACUTE KIDNEY INJURY) (H): Status: ACTIVE | Noted: 2019-03-23

## 2019-03-23 PROBLEM — J18.9 PNEUMONIA: Status: ACTIVE | Noted: 2019-03-23

## 2019-03-23 PROBLEM — D69.6 THROMBOCYTOPENIA (H): Status: ACTIVE | Noted: 2019-03-23

## 2019-03-23 LAB
ERYTHROCYTE [DISTWIDTH] IN BLOOD BY AUTOMATED COUNT: 14.8 % (ref 10–15)
HCT VFR BLD AUTO: 37.8 % (ref 40–53)
HGB BLD-MCNC: 12.2 G/DL (ref 13.3–17.7)
INTERPRETATION ECG - MUSE: NORMAL
INTERPRETATION ECG - MUSE: NORMAL
LACTATE BLD-SCNC: 1.1 MMOL/L (ref 0.7–2)
LACTATE BLD-SCNC: 1.4 MMOL/L (ref 0.7–2)
MCH RBC QN AUTO: 29.2 PG (ref 26.5–33)
MCHC RBC AUTO-ENTMCNC: 32.3 G/DL (ref 31.5–36.5)
MCV RBC AUTO: 90 FL (ref 78–100)
PLATELET # BLD AUTO: 151 10E9/L (ref 150–450)
RBC # BLD AUTO: 4.18 10E12/L (ref 4.4–5.9)
WBC # BLD AUTO: 11.4 10E9/L (ref 4–11)

## 2019-03-23 PROCEDURE — 40000264 ECHOCARDIOGRAM COMPLETE

## 2019-03-23 PROCEDURE — 25000128 H RX IP 250 OP 636: Performed by: INTERNAL MEDICINE

## 2019-03-23 PROCEDURE — 25500064 ZZH RX 255 OP 636: Performed by: INTERNAL MEDICINE

## 2019-03-23 PROCEDURE — A9270 NON-COVERED ITEM OR SERVICE: HCPCS | Performed by: INTERNAL MEDICINE

## 2019-03-23 PROCEDURE — 25000132 ZZH RX MED GY IP 250 OP 250 PS 637: Performed by: INTERNAL MEDICINE

## 2019-03-23 PROCEDURE — 25800030 ZZH RX IP 258 OP 636: Performed by: INTERNAL MEDICINE

## 2019-03-23 PROCEDURE — 12000000 ZZH R&B MED SURG/OB

## 2019-03-23 PROCEDURE — 99232 SBSQ HOSP IP/OBS MODERATE 35: CPT | Performed by: INTERNAL MEDICINE

## 2019-03-23 PROCEDURE — 83605 ASSAY OF LACTIC ACID: CPT | Performed by: INTERNAL MEDICINE

## 2019-03-23 PROCEDURE — 36415 COLL VENOUS BLD VENIPUNCTURE: CPT | Performed by: INTERNAL MEDICINE

## 2019-03-23 PROCEDURE — 93306 TTE W/DOPPLER COMPLETE: CPT | Mod: 26 | Performed by: INTERNAL MEDICINE

## 2019-03-23 PROCEDURE — 99231 SBSQ HOSP IP/OBS SF/LOW 25: CPT | Performed by: SURGERY

## 2019-03-23 PROCEDURE — 99232 SBSQ HOSP IP/OBS MODERATE 35: CPT | Mod: 25 | Performed by: INTERNAL MEDICINE

## 2019-03-23 PROCEDURE — 85027 COMPLETE CBC AUTOMATED: CPT | Performed by: INTERNAL MEDICINE

## 2019-03-23 PROCEDURE — 25000128 H RX IP 250 OP 636: Performed by: NURSE PRACTITIONER

## 2019-03-23 PROCEDURE — 25800030 ZZH RX IP 258 OP 636: Performed by: NURSE PRACTITIONER

## 2019-03-23 RX ORDER — HYDROXYZINE HYDROCHLORIDE 25 MG/1
25 TABLET, FILM COATED ORAL EVERY 6 HOURS PRN
Status: DISCONTINUED | OUTPATIENT
Start: 2019-03-23 | End: 2019-03-28 | Stop reason: HOSPADM

## 2019-03-23 RX ORDER — METOPROLOL TARTRATE 25 MG/1
25 TABLET, FILM COATED ORAL ONCE
Status: COMPLETED | OUTPATIENT
Start: 2019-03-23 | End: 2019-03-23

## 2019-03-23 RX ORDER — HYDROXYZINE HYDROCHLORIDE 25 MG/1
50 TABLET, FILM COATED ORAL EVERY 6 HOURS PRN
Status: DISCONTINUED | OUTPATIENT
Start: 2019-03-23 | End: 2019-03-28 | Stop reason: HOSPADM

## 2019-03-23 RX ADMIN — HUMAN ALBUMIN MICROSPHERES AND PERFLUTREN 9 ML: 10; .22 INJECTION, SOLUTION INTRAVENOUS at 09:18

## 2019-03-23 RX ADMIN — ACETAMINOPHEN 650 MG: 325 TABLET, FILM COATED ORAL at 17:24

## 2019-03-23 RX ADMIN — AMIODARONE HYDROCHLORIDE 0.5 MG/MIN: 50 INJECTION, SOLUTION INTRAVENOUS at 04:10

## 2019-03-23 RX ADMIN — PANTOPRAZOLE SODIUM 40 MG: 40 TABLET, DELAYED RELEASE ORAL at 08:45

## 2019-03-23 RX ADMIN — PIPERACILLIN SODIUM,TAZOBACTAM SODIUM 3.38 G: 3; .375 INJECTION, POWDER, FOR SOLUTION INTRAVENOUS at 18:46

## 2019-03-23 RX ADMIN — METOPROLOL TARTRATE 50 MG: 50 TABLET ORAL at 08:45

## 2019-03-23 RX ADMIN — HYDROXYZINE HYDROCHLORIDE 25 MG: 25 TABLET ORAL at 17:24

## 2019-03-23 RX ADMIN — PIPERACILLIN SODIUM,TAZOBACTAM SODIUM 3.38 G: 3; .375 INJECTION, POWDER, FOR SOLUTION INTRAVENOUS at 13:01

## 2019-03-23 RX ADMIN — PIPERACILLIN SODIUM,TAZOBACTAM SODIUM 3.38 G: 3; .375 INJECTION, POWDER, FOR SOLUTION INTRAVENOUS at 05:57

## 2019-03-23 RX ADMIN — PIPERACILLIN SODIUM,TAZOBACTAM SODIUM 3.38 G: 3; .375 INJECTION, POWDER, FOR SOLUTION INTRAVENOUS at 00:28

## 2019-03-23 RX ADMIN — SODIUM CHLORIDE: 9 INJECTION, SOLUTION INTRAVENOUS at 10:23

## 2019-03-23 RX ADMIN — AMIODARONE HYDROCHLORIDE 0.5 MG/MIN: 50 INJECTION, SOLUTION INTRAVENOUS at 14:00

## 2019-03-23 RX ADMIN — METOPROLOL TARTRATE 75 MG: 50 TABLET ORAL at 20:40

## 2019-03-23 RX ADMIN — METOPROLOL TARTRATE 25 MG: 25 TABLET ORAL at 13:02

## 2019-03-23 ASSESSMENT — ACTIVITIES OF DAILY LIVING (ADL)
ADLS_ACUITY_SCORE: 15
ADLS_ACUITY_SCORE: 13
ADLS_ACUITY_SCORE: 15

## 2019-03-23 ASSESSMENT — MIFFLIN-ST. JEOR: SCORE: 1804.14

## 2019-03-23 NOTE — PLAN OF CARE
A/Ox4. VSS except tachy at times. Diminished LS on 4 LPM on NC. +flatus, +bs, bm today, abdomen distended. Jordan catheter with adequate output,. Amiodarone at 0.5mg/min. Pain managed with PRN Oxycodone and Tylenol. Full liquid diet. SBA

## 2019-03-23 NOTE — PROGRESS NOTES
[facility]    Hospitalist Progress Note    Assessment & Plan   Sanjeev Blackmon is a 63 year-old male admitted on 3/19/2019 after presenting with 1 day of dry heaves with epigastrium pain, and fevers to 102F, clinical picture consistent with sepsis most likely due to intra-abdominal pathology based on admission CT findings.       Impression:   Principal Problem:    Sepsis -- ?source (?intra-abdominal vs related to pneumonia with ileus)   -- improving   -- sludge in gall bladder appears incidental (Negative Hepatobiliary scan)   -- WBC improving   -- will stop IV Dilaudid (may be contributing to ileus)    Active Problems:    Atrial flutter with rapid ventricular response    -- still on IV Amiodarone   -- Metoprolol 50 mg bid   -- echo planned today   -- [id number] Score 1 (1.3% annual stroke risk --low)      Gastroesophageal reflux disease with esophagitis   -- may have been contributing to epigastric abdominal pain   -- improved with PPI    BRANDEN (acute kidney injury) -- resolved    Pneumonia LLL   -- is on Zosyn, will switch to PO Ceftin      Plan:  Advance diet.  Will increase Metoprolol to 75 mg bid for better rate control.  Await Echo, ?consider discontinuing IV Amiodarone (per Cardiology).     DVT Prophylaxis: Pneumatic Compression Devices  Code Status: Full Code    Disposition: Expected discharge in 1 day to home    Samson Jacobs MD  Pager [phone]  Cell Phone [phone]  Text Page (7am to 6pm)    Interval History   Advanced to regular diet, abd pain is 2 out of 10 at rest, up to 6-8 when moving around.     Physical Exam   Temp: 98.4  F (36.9  C) Temp src: Oral BP: (!) 138/96 Pulse: 98 Heart Rate: 109 Resp: 16 SpO2: 93 % O2 Device: Nasal cannula Oxygen Delivery: 1 LPM  Vitals:    03/20/19 0542 03/22/19 0702 03/23/19 0541   Weight: 93.2 kg (205 lb 8 oz) 99.2 kg (218 lb 11.1 oz) 102.9 kg (226 lb 12.8 oz)     Vital Signs with Ranges  Temp:  [97.2  F (36.2  C)-98.4  F (36.9  C)]  98.4  F (36.9  C)  Pulse:  [] 98  Heart Rate:  [104-126] 109  Resp:  [13-20] 16  BP: ()/(66-97) 138/96  FiO2 (%):  [40 %] 40 %  SpO2:  [92 %-97 %] 93 %  I/O last 3 completed shifts:  In: 5477 [P.O.:600; I.V.:4127]  Out: 585 [Urine:585]    # Pain Assessment:  Current Pain Score 3/23/2019   Patient currently in pain? denies   Pain score (0-10) -   Sanjeev villalobos pain level was assessed and he currently denies pain.        Constitutional: Awake, alert, cooperative, no apparent distress  Respiratory: Clear to auscultation bilaterally, no crackles or wheezing  Cardiovascular: Regular rate and rhythm, normal S1 and S2, and no murmur noted  GI: mildly distended, non-tender to palpation, bowel sounds present but decreased   Extrem: No calf tenderness, no ankle edema  Neuro: Ox3, no focal motor or sensory deficits    Medications     amiodarone 0.5 mg/min (03/23/19 0410)     - MEDICATION INSTRUCTIONS -       sodium chloride 100 mL/hr at 03/23/19 1023       metoprolol tartrate  50 mg Oral BID     pantoprazole  40 mg Oral QAM AC     piperacillin-tazobactam  3.375 g Intravenous Q6H     sodium chloride (PF)  3 mL Intracatheter Q8H       Data   Recent Labs   Lab 03/23/19  0550 03/22/19  0553 03/21/19  0947 03/21/19  0628 03/21/19  0143  03/20/19  0451   WBC 11.4* 15.0*  --  14.6*  --   --  15.0*   HGB 12.2* 11.6*  --  11.8*  --   --  13.1*   MCV 90 90  --  91  --   --  90    136*  --  127*  --   --  129*   INR  --   --   --   --  1.39*  --   --    NA  --  140  --  142 142  --  140   POTASSIUM  --  3.6  --  3.7 4.1  --  3.8   CHLORIDE  --  112*  --  114* 111*  --  110*   CO2  --  23  --  20 24  --  21   BUN  --  29  --  32* 35*  --  34*   CR  --  1.23  --  1.28* 1.50*  --  1.65*   ANIONGAP  --  5  --  8 7  --  9   LEANNA  --  8.0*  --  7.9* 8.1*  --  7.7*   GLC  --  92  --  108* 78  --  93   ALBUMIN  --   --   --  1.9*  --   --  2.2*   PROTTOTAL  --   --   --  6.1*  --   --  6.2*   BILITOTAL  --   --   --  1.0  --   --   1.6*   ALKPHOS  --   --   --  41  --   --  41   ALT  --   --   --  17  --   --  20   AST  --   --   --  22  --   --  18   LIPASE  --   --   --  94 128   < > 332   TROPI  --   --  0.042 0.039  Canceled, Test credited 0.069*  --   --     < > = values in this interval not displayed.       Imaging:   Recent Results (from the past 24 hour(s))   XR Abdomen 1 View    Narrative    ABDOMEN ONE VIEW  3/22/2019 5:06 PM     HISTORY: Evaluate for ileus.    COMPARISON: None.       Impression    IMPRESSION: Minimal amount of stool.  Nonobstructed bowel gas pattern.  Question left lower lobe atelectasis and/or infiltrate.    LEMUEL DUNCAN MD

## 2019-03-23 NOTE — PROGRESS NOTES
"Cardiology Progress Note   Date of service: 19       Assessment and Plan:     1. Atrial flutter in the setting of sepsis, improving    Patient not currently anticoagulated.    Sepsis improving.    Continue on current medications.    Will readdress long term plan with him tomorrow.    May entail DCCV +/- ELI on Monday if doesn't convert on his own.    May need to do a short course of anticoagulation and 30 day monitor on discharge.                 Interval History:   Patient sleeping, did not awaken easily to my voice.              Review of Systems:   As per subjective, otherwise 5 systems reviewed and negative.           Physical Exam:     Vitals were reviewed  Blood pressure (!) 135/97, pulse 95, temperature 98.2  F (36.8  C), temperature source Oral, resp. rate 16, height 1.753 m (5' 9\"), weight 102.9 kg (226 lb 12.8 oz), SpO2 90 %.  Temperatures:  Current - Temp: 98.2  F (36.8  C); Max - Temp  Av  F (36.7  C)  Min: 97.2  F (36.2  C)  Max: 98.4  F (36.9  C)  Respiration range: Resp  Av.3  Min: 16  Max: 18  Pulse range: Pulse  Av.9  Min: 63  Max: 98  Blood pressure range: Systolic (24hrs), Av , Min:104 , Max:138   ; Diastolic (24hrs), Av, Min:66, Max:97    Pulse oximetry range: SpO2  Av.8 %  Min: 90 %  Max: 97 %    Intake/Output Summary (Last 24 hours) at 3/23/2019 1449  Last data filed at 3/23/2019 1226  Gross per 24 hour   Intake 4347 ml   Output 775 ml   Net 3572 ml       Constitutional:   Sleeping comfortably, no apparent distress, and appears stated age     Eyes:   Lids and lashes normal     Neck:   symmetrical, trachea midline     Back:   symmetric     Lungs:   Symmetric       Musculoskeletal:   motor strength is 5 out of 5 all extremities bilaterally     Neurologic:   Grossly nonfocal     Skin:   normal skin color, texture, turgor            Medications:     Current Facility-Administered Medications Ordered in Epic   Medication Dose Route Frequency Last Rate Last Dose     " acetaminophen (TYLENOL) Suppository 650 mg  650 mg Rectal Q4H PRN         acetaminophen (TYLENOL) tablet 650 mg  650 mg Oral Q4H PRN   650 mg at 03/22/19 1948     amiodarone in D5W adult drip (NEXTERONE) 250 MG/250ML IV infusion  0.5 mg/min Intravenous Continuous 30 mL/hr at 03/23/19 0410 0.5 mg/min at 03/23/19 0410     bisacodyl (DULCOLAX) EC tablet 5 mg  5 mg Oral Daily PRN        Or     bisacodyl (DULCOLAX) EC tablet 10 mg  10 mg Oral Daily PRN        Or     bisacodyl (DULCOLAX) EC tablet 15 mg  15 mg Oral Daily PRN         hydrOXYzine (ATARAX) tablet 25 mg  25 mg Oral Q6H PRN        Or     hydrOXYzine (ATARAX) tablet 50 mg  50 mg Oral Q6H PRN         lidocaine (LMX4) cream   Topical Q1H PRN         lidocaine 1 % 0.1-1 mL  0.1-1 mL Other Q1H PRN         May continue current IV fluids if patient has IV fluids infusing.   Does not apply Continuous PRN         melatonin tablet 1 mg  1 mg Oral At Bedtime PRN   1 mg at 03/21/19 2130     metoclopramide (REGLAN) tablet 5 mg  5 mg Oral Q6H PRN        Or     metoclopramide (REGLAN) injection 5 mg  5 mg Intravenous Q6H PRN         metoprolol tartrate (LOPRESSOR) tablet 75 mg  75 mg Oral BID         naloxone (NARCAN) injection 0.1-0.4 mg  0.1-0.4 mg Intravenous Q2 Min PRN         nitroGLYcerin (NITROSTAT) sublingual tablet 0.4 mg  0.4 mg Sublingual Q5 Min PRN         ondansetron (ZOFRAN-ODT) ODT tab 4 mg  4 mg Oral Q6H PRN        Or     ondansetron (ZOFRAN) injection 4 mg  4 mg Intravenous Q6H PRN         oxyCODONE (ROXICODONE) tablet 5-10 mg  5-10 mg Oral Q3H PRN   5 mg at 03/22/19 1948     pantoprazole (PROTONIX) EC tablet 40 mg  40 mg Oral QAM AC   40 mg at 03/23/19 0845     piperacillin-tazobactam (ZOSYN) 3.375 g vial to attach to  mL bag  3.375 g Intravenous Q6H   3.375 g at 03/23/19 1301     polyethylene glycol (MIRALAX/GLYCOLAX) Packet 17 g  17 g Oral Daily PRN         prochlorperazine (COMPAZINE) injection 5 mg  5 mg Intravenous Q6H PRN        Or      prochlorperazine (COMPAZINE) tablet 5 mg  5 mg Oral Q6H PRN        Or     prochlorperazine (COMPAZINE) Suppository 12.5 mg  12.5 mg Rectal Q12H PRN         sodium chloride (PF) 0.9% PF flush 3 mL  3 mL Intravenous q1 min prn         sodium chloride (PF) 0.9% PF flush 3 mL  3 mL Intracatheter q1 min prn         sodium chloride (PF) 0.9% PF flush 3 mL  3 mL Intracatheter Q8H   3 mL at 19 0029     sodium chloride 0.9% infusion   Intravenous Continuous 75 mL/hr at 19 1226       No current [facility]-ordered outpatient medications on file.                Data:   All laboratory data reviewed  Results for orders placed or performed during the hospital encounter of 19 (from the past 24 hour(s))   XR Abdomen 1 View    Narrative    ABDOMEN ONE VIEW  3/22/2019 5:06 PM     HISTORY: Evaluate for ileus.    COMPARISON: None.       Impression    IMPRESSION: Minimal amount of stool.  Nonobstructed bowel gas pattern.  Question left lower lobe atelectasis and/or infiltrate.    LEMUEL DUNCAN MD   Lactic acid whole blood   Result Value Ref Range    Lactic Acid 1.5 0.7 - 2.0 mmol/L   Lactic acid whole blood   Result Value Ref Range    Lactic Acid 1.4 0.7 - 2.0 mmol/L   Lactic acid whole blood   Result Value Ref Range    Lactic Acid 1.1 0.7 - 2.0 mmol/L   CBC with platelets   Result Value Ref Range    WBC 11.4 (H) 4.0 - 11.0 10e9/L    RBC Count 4.18 (L) 4.4 - 5.9 10e12/L    Hemoglobin 12.2 (L) 13.3 - 17.7 g/dL    Hematocrit 37.8 (L) 40.0 - 53.0 %    MCV 90 78 - 100 fl    MCH 29.2 26.5 - 33.0 pg    MCHC 32.3 31.5 - 36.5 g/dL    RDW 14.8 10.0 - 15.0 %    Platelet Count 151 150 - 450 10e9/L   Echocardiogram Complete    Narrative    [id number]  [id number]  [id number]  [id number]^DANI^MOTHILAL^XU           [facility]  Echocardiography Laboratory  [address] [place] [address]        Name: TIM ROCK  MRN: [id number]  : 1955  Study Date: 2019 08:48 AM  Age: 63 yrs  Gender: Male  Patient  [facility]: [facility]  Reason For Study: Afib  Ordering Physician: DARLENE LOUISE  Referring Physician: Timmy Calix  Performed By: Ximena Avila     BSA: 2.1 m2  Height: 69 in  Weight: 218 lb  HR: 107  BP: 104/72 mmHg  _____________________________________________________________________________  __        Procedure  Complete Portable Echo Adult. Contrast Optison.  _____________________________________________________________________________  __        Interpretation Summary     The left ventricle is normal in size.  There is normal left ventricular wall thickness.  The visual ejection fraction is estimated at 50-55%.  Diastolic Doppler findings (E/E' ratio and/or other parameters) suggest left  ventricular filling pressures are normal.  No regional wall motion abnormalities noted.  No significant valvular heart disease.  The rhythm was atrial flutter.  _____________________________________________________________________________  __        Left Ventricle  The left ventricle is normal in size. There is normal left ventricular wall  thickness. The visual ejection fraction is estimated at 50-55%. Diastolic  Doppler findings (E/E' ratio and/or other parameters) suggest left ventricular  filling pressures are normal. No regional wall motion abnormalities noted.     Right Ventricle  The right ventricle is normal in size and function.     Atria  The left atrium is mildly dilated. Right atrial size is normal. There is no  color Doppler evidence of an atrial shunt.     Mitral Valve  The mitral valve leaflets are mildly thickened. There is trace mitral  regurgitation.        Tricuspid Valve  There is trace tricuspid regurgitation. Normal IVC (1.5-2.5cm) with >50%  respiratory collapse; right atrial pressure is estimated at 5-10mmHg. The  right ventricular systolic pressure is approximated at 32.6 mmHg plus the  right atrial pressure.     Aortic Valve  The aortic valve is not well visualized. No aortic regurgitation is  present.     Pulmonic Valve  There is trace pulmonic valvular regurgitation.     Vessels  Mild aortic root dilatation. The ascending aorta is Mildly dilated.     Pericardium  There is no pericardial effusion.        Rhythm  The rhythm was atrial flutter.  _____________________________________________________________________________  __  MMode/2D Measurements & Calculations  IVSd: 1.1 cm     LVIDd: 4.4 cm  LVIDs: 3.4 cm  LVPWd: 1.0 cm  FS: 22.7 %  LV mass(C)d: 163.4 grams  LV mass(C)dI: 76.3 grams/m2  Ao root diam: 4.1 cm  LA dimension: 4.4 cm  asc Aorta Diam: 4.1 cm  LA/Ao: 1.1  LA Volume (BP): 90.3 ml  LA Volume Index (BP): 42.2 ml/m2  RWT: 0.47           Doppler Measurements & Calculations  MV E max milind: 65.1 cm/sec  MV dec time: 0.13 sec  TR max milind: 285.6 cm/sec  TR max P.6 mmHg  E/E' av.1  Lateral E/e': 3.5  Medial E/e': 4.7           _____________________________________________________________________________  __           Report approved by: Matt Stone 2019 11:41 AM          All laboratory data reviewed  Lab Results   Component Value Date    CHOL 233 2015     Lab Results   Component Value Date    HDL 78 2015     Lab Results   Component Value Date     2015     Lab Results   Component Value Date    TRIG 66 2015     No results found for: CHOLHDLRATIO  No results found for: TSH  Last Basic Metabolic Panel:  Lab Results   Component Value Date     2019      Lab Results   Component Value Date    POTASSIUM 3.6 2019     Lab Results   Component Value Date    CHLORIDE 112 2019     Lab Results   Component Value Date    LEANNA 8.0 2019     Lab Results   Component Value Date    CO2 23 2019     Lab Results   Component Value Date    BUN 29 2019     Lab Results   Component Value Date    CR 1.23 2019     Lab Results   Component Value Date    GLC 92 2019     Lab Results   Component Value Date    WBC 11.4 2019     Lab  Results   Component Value Date    RBC 4.18 03/23/2019     Lab Results   Component Value Date    HGB 12.2 03/23/2019     Lab Results   Component Value Date    HCT 37.8 03/23/2019     Lab Results   Component Value Date    MCV 90 03/23/2019     Lab Results   Component Value Date    MCH 29.2 03/23/2019     Lab Results   Component Value Date    MCHC 32.3 03/23/2019     Lab Results   Component Value Date    RDW 14.8 03/23/2019     Lab Results   Component Value Date     03/23/2019

## 2019-03-23 NOTE — PROGRESS NOTES
Surgery    Patient feeling better.  Still feels tired and weak.  No fevers.  WBC decreasing.  Tolerating full's but has decreased appetite.    Abdomen-mild distention without significant tenderness throughout.    A/P  Overall improving.  No indications for surgical management at this point.  Okay to slowly increase diet when tolerating better.    Sina Soto M.D.  [facility]  [phone]

## 2019-03-23 NOTE — PROGRESS NOTES
"GASTROENTEROLOGY PROGRESS NOTE    IMPRESSION: 62 y/o male who presented with fevers, nausea / dry heaves, and epigastric pain, found to be tachycardic / hypotensive, with an elevated lactate and leukocytosis (20K).  CT 3/19 with inflammatory changes in the upper abdomen and retroperitoneum, abutting the gastric antrum and pancreas.  Lipase is normal, but total bilirubin 2.5.  US with GB wall thickening and sludge, but HIDA negative for acute cholecystitis.  EGD 3/19 with LA grade B esophagitis and small HH, but no etiology for his symptoms.  Repeat CT 3/20 unchanged. WBC, lactate, and bilirubin have all improved.    Although his symptoms may be consistent with pancreatitis (N/V, epigastric pain), his lipase is normal, and his CT is certainly not classic for such. No report of heavy alcohol (drank ~2 alcoholic beverages / day on Friday and Saturday, with onset of symptoms on Monday).  If pancreatitis, may have been biliary etiology, with sludge seen, and elevated bili on presentation.  Some sort of infectious process also remains on the differential diagnosis.     RECOMMENDATIONS:  -- Continue full liquid diet.  If not tolerating, may need to consider enteral nutrition.   -- Continue PPI  -- May consider repeat CT scan per pancreatic protocol vs outpatient EUS, pending course.    We will continue to follow with you.      ----------------------------------------------------------------------------------------------------------------------    SUBJECTIVE:  Having ongoing pain - has now moved to the lower abdomen, hurts more with movement    OBJECTIVE:  General Appearance:  No acute distress  BP (!) 138/96 (BP Location: Right arm)   Pulse 98   Temp 98.4  F (36.9  C) (Oral)   Resp 16   Ht 1.753 m (5' 9\")   Wt 102.9 kg (226 lb 12.8 oz)   SpO2 97%   BMI 33.49 kg/m    Temp (24hrs), Av.1  F (36.7  C), Min:97.2  F (36.2  C), Max:98.4  F (36.9  C)    Patient Vitals for the past 72 hrs:   Weight   19 0541 102.9 " kg (226 lb 12.8 oz)   03/22/19 0702 99.2 kg (218 lb 11.1 oz)       Intake/Output Summary (Last 24 hours) at 3/23/2019 0921  Last data filed at 3/23/2019 0600  Gross per 24 hour   Intake 5477 ml   Output 585 ml   Net 4892 ml       General: awake, alert, well appearing  HEENT: mucous membranes moist, no scleral icterus, no conjunctival injection  Cardiovascular: normal rate  Chest: no accessory muscle use  Abdomen: mildly distended, TTP throughout  Extremities: no edema  Skin: no rashes or lesions    LABS:  I have reviewed the patient's new clinical lab results:    Recent Labs   Lab Test 03/23/19  0550 03/22/19  0553 03/21/19  0628 03/21/19  0143   WBC 11.4* 15.0* 14.6*  --    HGB 12.2* 11.6* 11.8*  --    MCV 90 90 91  --     136* 127*  --    INR  --   --   --  1.39*     Recent Labs   Lab Test 03/22/19  0553 03/21/19  0628 03/21/19  0143   POTASSIUM 3.6 3.7 4.1   CHLORIDE 112* 114* 111*   CO2 23 20 24   BUN 29 32* 35*   ANIONGAP 5 8 7     Recent Labs   Lab Test 03/21/19  0628 03/21/19  0143 03/20/19  1543 03/20/19  0451 03/20/19  0325 03/19/19  1450   ALBUMIN 1.9*  --   --  2.2*  --  3.1*   BILITOTAL 1.0  --   --  1.6*  --  2.5*   ALT 17  --   --  20  --  27   AST 22  --   --  18  --  18   PROTEIN  --   --   --   --  100*  --    LIPASE 94 128 210 332  --  332       IMAGING:  No new applicable imaging.    Anahy Sterling MD  [facility]  Pager: [phone]  After 5pm: [phone]

## 2019-03-23 NOTE — PLAN OF CARE
A&Ox4.  AVSS, on O2 @ 3L/NC.  A-Flutter per monitor.  Amio gtt st 30ml/hr.  Denies need for pain meds overnight.  Abd distended, passing gas, had BM previous shift.  Indwelling urinary catheter intact and patent.  Up to chair/BSC with 1A.   Slept in between care.  Plan to have ECHO today.

## 2019-03-23 NOTE — PLAN OF CARE
AVSS on RA ex rhythm remains new dx of rapid aflutter with variable conduction. Amio gtt at 30. Flank pain controlled with tylenol, atarax, heat packs, increased ambulation and up in chair. LS diminished, on RA. IS 1000. Jordan out today now voiding with adequate UOP. Ambulating in halls with encouragement and assist 1 gb. Tolerating fulls, denies nausea. Faint BS, +BM yest, passing flatus. Abd firm/tender. Updated wife Katrina by phone.

## 2019-03-24 LAB
ANION GAP SERPL CALCULATED.3IONS-SCNC: 8 MMOL/L (ref 3–14)
BUN SERPL-MCNC: 21 MG/DL (ref 7–30)
CALCIUM SERPL-MCNC: 8.1 MG/DL (ref 8.5–10.1)
CHLORIDE SERPL-SCNC: 108 MMOL/L (ref 94–109)
CO2 SERPL-SCNC: 24 MMOL/L (ref 20–32)
CREAT SERPL-MCNC: 1.15 MG/DL (ref 0.66–1.25)
GFR SERPL CREATININE-BSD FRML MDRD: 66 ML/MIN/{1.73_M2}
GLUCOSE SERPL-MCNC: 94 MG/DL (ref 70–99)
LACTATE BLD-SCNC: 1.3 MMOL/L (ref 0.7–2)
MAGNESIUM SERPL-MCNC: 1.8 MG/DL (ref 1.6–2.3)
POTASSIUM SERPL-SCNC: 2.9 MMOL/L (ref 3.4–5.3)
SODIUM SERPL-SCNC: 140 MMOL/L (ref 133–144)
WBC # BLD AUTO: 15.7 10E9/L (ref 4–11)

## 2019-03-24 PROCEDURE — A9270 NON-COVERED ITEM OR SERVICE: HCPCS | Performed by: INTERNAL MEDICINE

## 2019-03-24 PROCEDURE — 99207 ZZC CDG-MDM COMPONENT: MEETS MODERATE - UP CODED: CPT | Performed by: INTERNAL MEDICINE

## 2019-03-24 PROCEDURE — 36415 COLL VENOUS BLD VENIPUNCTURE: CPT | Performed by: INTERNAL MEDICINE

## 2019-03-24 PROCEDURE — 25800030 ZZH RX IP 258 OP 636: Performed by: NURSE PRACTITIONER

## 2019-03-24 PROCEDURE — 25000132 ZZH RX MED GY IP 250 OP 250 PS 637: Performed by: INTERNAL MEDICINE

## 2019-03-24 PROCEDURE — 25000128 H RX IP 250 OP 636: Performed by: NURSE PRACTITIONER

## 2019-03-24 PROCEDURE — 99233 SBSQ HOSP IP/OBS HIGH 50: CPT | Performed by: INTERNAL MEDICINE

## 2019-03-24 PROCEDURE — 85048 AUTOMATED LEUKOCYTE COUNT: CPT | Performed by: INTERNAL MEDICINE

## 2019-03-24 PROCEDURE — 25800030 ZZH RX IP 258 OP 636: Performed by: INTERNAL MEDICINE

## 2019-03-24 PROCEDURE — 25000128 H RX IP 250 OP 636: Performed by: INTERNAL MEDICINE

## 2019-03-24 PROCEDURE — 99232 SBSQ HOSP IP/OBS MODERATE 35: CPT | Performed by: INTERNAL MEDICINE

## 2019-03-24 PROCEDURE — 83605 ASSAY OF LACTIC ACID: CPT | Performed by: INTERNAL MEDICINE

## 2019-03-24 PROCEDURE — 83735 ASSAY OF MAGNESIUM: CPT | Performed by: INTERNAL MEDICINE

## 2019-03-24 PROCEDURE — 80048 BASIC METABOLIC PNL TOTAL CA: CPT | Performed by: INTERNAL MEDICINE

## 2019-03-24 PROCEDURE — 12000000 ZZH R&B MED SURG/OB

## 2019-03-24 RX ORDER — POTASSIUM CHLORIDE 1500 MG/1
40 TABLET, EXTENDED RELEASE ORAL EVERY 4 HOURS
Status: COMPLETED | OUTPATIENT
Start: 2019-03-24 | End: 2019-03-24

## 2019-03-24 RX ORDER — CEFUROXIME AXETIL 500 MG/1
500 TABLET ORAL EVERY 12 HOURS SCHEDULED
Status: DISCONTINUED | OUTPATIENT
Start: 2019-03-24 | End: 2019-03-24

## 2019-03-24 RX ORDER — METOPROLOL TARTRATE 100 MG
100 TABLET ORAL 2 TIMES DAILY
Status: DISCONTINUED | OUTPATIENT
Start: 2019-03-24 | End: 2019-03-25

## 2019-03-24 RX ORDER — FUROSEMIDE 10 MG/ML
40 INJECTION INTRAMUSCULAR; INTRAVENOUS ONCE
Status: COMPLETED | OUTPATIENT
Start: 2019-03-24 | End: 2019-03-24

## 2019-03-24 RX ORDER — CEFUROXIME AXETIL 500 MG/1
500 TABLET ORAL EVERY 12 HOURS SCHEDULED
Status: DISCONTINUED | OUTPATIENT
Start: 2019-03-24 | End: 2019-03-26

## 2019-03-24 RX ADMIN — FUROSEMIDE 40 MG: 10 INJECTION, SOLUTION INTRAVENOUS at 09:29

## 2019-03-24 RX ADMIN — POTASSIUM CHLORIDE 40 MEQ: 1500 TABLET, EXTENDED RELEASE ORAL at 13:12

## 2019-03-24 RX ADMIN — POTASSIUM CHLORIDE 40 MEQ: 1500 TABLET, EXTENDED RELEASE ORAL at 20:30

## 2019-03-24 RX ADMIN — AMIODARONE HYDROCHLORIDE 0.5 MG/MIN: 50 INJECTION, SOLUTION INTRAVENOUS at 00:07

## 2019-03-24 RX ADMIN — PANTOPRAZOLE SODIUM 40 MG: 40 TABLET, DELAYED RELEASE ORAL at 06:38

## 2019-03-24 RX ADMIN — ACETAMINOPHEN 650 MG: 325 TABLET, FILM COATED ORAL at 13:12

## 2019-03-24 RX ADMIN — ACETAMINOPHEN 650 MG: 325 TABLET, FILM COATED ORAL at 18:40

## 2019-03-24 RX ADMIN — PIPERACILLIN SODIUM,TAZOBACTAM SODIUM 3.38 G: 3; .375 INJECTION, POWDER, FOR SOLUTION INTRAVENOUS at 00:07

## 2019-03-24 RX ADMIN — METOPROLOL TARTRATE 100 MG: 100 TABLET, FILM COATED ORAL at 09:29

## 2019-03-24 RX ADMIN — AMIODARONE HYDROCHLORIDE 0.5 MG/MIN: 50 INJECTION, SOLUTION INTRAVENOUS at 09:50

## 2019-03-24 RX ADMIN — PIPERACILLIN SODIUM,TAZOBACTAM SODIUM 3.38 G: 3; .375 INJECTION, POWDER, FOR SOLUTION INTRAVENOUS at 06:07

## 2019-03-24 RX ADMIN — SODIUM CHLORIDE: 9 INJECTION, SOLUTION INTRAVENOUS at 02:09

## 2019-03-24 RX ADMIN — APIXABAN 5 MG: 5 TABLET, FILM COATED ORAL at 20:30

## 2019-03-24 RX ADMIN — METOPROLOL TARTRATE 100 MG: 100 TABLET, FILM COATED ORAL at 20:30

## 2019-03-24 RX ADMIN — CEFUROXIME AXETIL 500 MG: 500 TABLET ORAL at 20:30

## 2019-03-24 RX ADMIN — AMIODARONE HYDROCHLORIDE 0.5 MG/MIN: 50 INJECTION, SOLUTION INTRAVENOUS at 19:24

## 2019-03-24 RX ADMIN — HYDROXYZINE HYDROCHLORIDE 25 MG: 25 TABLET ORAL at 13:12

## 2019-03-24 RX ADMIN — POTASSIUM CHLORIDE 40 MEQ: 1500 TABLET, EXTENDED RELEASE ORAL at 18:40

## 2019-03-24 RX ADMIN — ACETAMINOPHEN 650 MG: 325 TABLET, FILM COATED ORAL at 00:25

## 2019-03-24 ASSESSMENT — MIFFLIN-ST. JEOR: SCORE: 1816.38

## 2019-03-24 ASSESSMENT — ACTIVITIES OF DAILY LIVING (ADL)
ADLS_ACUITY_SCORE: 19
ADLS_ACUITY_SCORE: 15
ADLS_ACUITY_SCORE: 19
ADLS_ACUITY_SCORE: 19

## 2019-03-24 NOTE — PLAN OF CARE
A&Ox4. VSS on RA.  LS diminished. IS to 750. Tele monitored, Aflutter HR 90-110s. Amio gtt @ 30ml/hr. BPs stable. C/o bilateral flank pain, heat packs and PRN tylenol given. Ambulated in hallway x1 with SBA. Abdomen distended, tender, BS active, passing flatus. Voiding adequately. Up in chair for most of shift, sleeping between cares. Wt increased, trace edema noted. Full liquid diet. Discussed care with wife, Katrina, over the phone.

## 2019-03-24 NOTE — PROGRESS NOTES
"GASTROENTEROLOGY PROGRESS NOTE    IMPRESSION: 64 y/o male who presented with fevers, nausea / dry heaves, and epigastric pain, found to be tachycardic / hypotensive, with an elevated lactate and leukocytosis (20K).  CT 3/19 with inflammatory changes in the upper abdomen and retroperitoneum, abutting the gastric antrum and pancreas.  Lipase is normal, but total bilirubin 2.5.  US with GB wall thickening and sludge, but HIDA negative for acute cholecystitis.  EGD 3/19 with LA grade B esophagitis and small HH, but no etiology for his symptoms.  Repeat CT 3/20 unchanged. WBC, lactate, and bilirubin have all improved.     Although his symptoms may be consistent with pancreatitis (N/V, epigastric pain), his lipase is normal, and his CT is certainly not classic for such. No report of heavy alcohol (drank ~2 alcoholic beverages / day on Friday and Saturday, with onset of symptoms on Monday).  If pancreatitis, may have been biliary etiology, with sludge seen, and elevated bili on presentation.  Some sort of infectious process also remains on the differential diagnosis.     Course has been complicated by atrial flutter with RVR.  Cardiology is following.     RECOMMENDATIONS:  -- Advance to regular diet  -- Continue PPI  -- May consider repeat CT scan per pancreatic protocol vs outpatient EUS, pending course.    We will continue to follow with you.    ----------------------------------------------------------------------------------------------------------------------    SUBJECTIVE:  Tolerating full liquids since yesterday. Wants to eat more. Abdominal pain improving.    OBJECTIVE:  BP (!) 136/96   Pulse 95   Temp 98.5  F (36.9  C) (Oral)   Resp 16   Ht 1.753 m (5' 9\")   Wt 104.1 kg (229 lb 8 oz)   SpO2 95%   BMI 33.89 kg/m    Temp (24hrs), Av  F (36.7  C), Min:97.6  F (36.4  C), Max:98.5  F (36.9  C)    Patient Vitals for the past 72 hrs:   Weight   19 0525 104.1 kg (229 lb 8 oz)   19 0541 102.9 kg (226 " lb 12.8 oz)   03/22/19 0702 99.2 kg (218 lb 11.1 oz)       Intake/Output Summary (Last 24 hours) at 3/24/2019 1010  Last data filed at 3/24/2019 1000  Gross per 24 hour   Intake 2267 ml   Output 2350 ml   Net -83 ml       General: awake, alert, well appearing  HEENT: mucous membranes moist, no scleral icterus, no conjunctival injection  Cardiovascular: normal rate  Chest: no accessory muscle use  Abdomen: soft, nontender, nondistended  Extremities: no edema  Skin: no rashes or lesions      LABS:  I have reviewed the patient's new clinical lab results:    Recent Labs   Lab Test 03/23/19  0550 03/22/19  0553 03/21/19  0628 03/21/19  0143   WBC 11.4* 15.0* 14.6*  --    HGB 12.2* 11.6* 11.8*  --    MCV 90 90 91  --     136* 127*  --    INR  --   --   --  1.39*     Recent Labs   Lab Test 03/22/19  0553 03/21/19  0628 03/21/19  0143   POTASSIUM 3.6 3.7 4.1   CHLORIDE 112* 114* 111*   CO2 23 20 24   BUN 29 32* 35*   ANIONGAP 5 8 7     Recent Labs   Lab Test 03/21/19  0628 03/21/19  0143 03/20/19  1543 03/20/19  0451 03/20/19  0325 03/19/19  1450   ALBUMIN 1.9*  --   --  2.2*  --  3.1*   BILITOTAL 1.0  --   --  1.6*  --  2.5*   ALT 17  --   --  20  --  27   AST 22  --   --  18  --  18   PROTEIN  --   --   --   --  100*  --    LIPASE 94 128 210 332  --  332       IMAGING:  No new applicable imaging.    Anahy Sterling MD  [facility]  Pager: [phone]  After 5pm: [phone]

## 2019-03-24 NOTE — PROGRESS NOTES
[facility]    Hospitalist Progress Note    Assessment & Plan   Sanjeev Blackmon is a 63 year-old male admitted on 3/19/2019 after presenting with 1 day of dry heaves with epigastrium pain, and fevers to 102F, clinical picture consistent with sepsis most likely due to intra-abdominal pathology based on admission CT findings, or related to pneumonia, but overall process seems improving:       Impression:   Principal Problem:    Sepsis -- ?source (?intra-abdominal vs related to pneumonia with ileus)   -- improving   -- sludge in gall bladder appears incidental (Negative Hepatobiliary scan)   -- WBC improving   -- will stop IV Dilaudid (may be contributing to ileus)    Active Problems:    Atrial flutter with rapid ventricular response    -- still on IV Amiodarone   --  Increase Metoprolol to 100 mg bid   -- echo OK (EF 50-55%)   -- [id number] Score 1 (1.3% annual stroke risk --low)      Gastroesophageal reflux disease with esophagitis   -- may have been contributing to epigastric abdominal pain   -- improved with PPI    BRANDEN (acute kidney injury) -- resolved    Pneumonia LLL   -- is on Zosyn, will switch to PO Ceftin    Fluid retention -- Wt up 29 lbs   -- IV lasix 40 mg today    Hypokalemia -- related to Zosyn   -- replace potassium, check in AM      Plan:  Advance diet.  Will increase Metoprolol to 100 mg bid for better rate control.  Cardiology ?ELI and cardiovert tomorrow if needed?     DVT Prophylaxis: Pneumatic Compression Devices  Code Status: Full Code    Disposition: Expected discharge in 1 day to home    Samson Jacobs MD  Pager [phone]  Cell Phone [phone]  Text Page (7am to 6pm)    Interval History   Continue regular diet.     Physical Exam   Temp: 98.2  F (36.8  C) Temp src: Oral BP: (!) 157/93   Heart Rate: 55 Resp: 16 SpO2: 100 % O2 Device: None (Room air)    Vitals:    03/22/19 0702 03/23/19 0541 03/24/19 0525   Weight: 99.2 kg (218 lb 11.1 oz) 102.9 kg (226 lb 12.8  oz) 104.1 kg (229 lb 8 oz)     Vital Signs with Ranges  Temp:  [97.6  F (36.4  C)-98.5  F (36.9  C)] 98.2  F (36.8  C)  Heart Rate:  [] 55  Resp:  [16] 16  BP: (136-157)/() 157/93  SpO2:  [89 %-100 %] 100 %  I/O last 3 completed shifts:  In: 2017 [P.O.:670; I.V.:1347]  Out: 1550 [Urine:1550]    # Pain Assessment:  Current Pain Score 3/24/2019   Patient currently in pain? -   Pain score (0-10) 6   Sanjeev s pain level was assessed and he currently denies pain.        Constitutional: Awake, alert, cooperative, no apparent distress  Respiratory: Clear to auscultation bilaterally, no crackles or wheezing  Cardiovascular: Regular rate and rhythm, normal S1 and S2, and no murmur noted  GI: mildly distended, non-tender to palpation, bowel sounds present but decreased, scrotal swelling   Extrem: No calf tenderness, no ankle edema  Neuro: Ox3, no focal motor or sensory deficits    Medications     amiodarone 0.5 mg/min (03/24/19 0950)     - MEDICATION INSTRUCTIONS -         apixaban ANTICOAGULANT  5 mg Oral BID     cefuroxime  500 mg Oral Q12H GARCIA     metoprolol tartrate  100 mg Oral BID     pantoprazole  40 mg Oral QAM AC     potassium chloride  40 mEq Oral Q4H     sodium chloride (PF)  3 mL Intracatheter Q8H       Data   Recent Labs   Lab 03/24/19  1039 03/23/19  0550 03/22/19  0553 03/21/19  0947 03/21/19  0628 03/21/19  0143  03/20/19  0451   WBC 15.7* 11.4* 15.0*  --  14.6*  --   --  15.0*   HGB  --  12.2* 11.6*  --  11.8*  --   --  13.1*   MCV  --  90 90  --  91  --   --  90   PLT  --  151 136*  --  127*  --   --  129*   INR  --   --   --   --   --  1.39*  --   --      --  140  --  142 142  --  140   POTASSIUM 2.9*  --  3.6  --  3.7 4.1  --  3.8   CHLORIDE 108  --  112*  --  114* 111*  --  110*   CO2 24  --  23  --  20 24  --  21   BUN 21  --  29  --  32* 35*  --  34*   CR 1.15  --  1.23  --  1.28* 1.50*  --  1.65*   ANIONGAP 8  --  5  --  8 7  --  9   LEANNA 8.1*  --  8.0*  --  7.9* 8.1*  --  7.7*   GLC 94   --  92  --  108* 78  --  93   ALBUMIN  --   --   --   --  1.9*  --   --  2.2*   PROTTOTAL  --   --   --   --  6.1*  --   --  6.2*   BILITOTAL  --   --   --   --  1.0  --   --  1.6*   ALKPHOS  --   --   --   --  41  --   --  41   ALT  --   --   --   --  17  --   --  20   AST  --   --   --   --  22  --   --  18   LIPASE  --   --   --   --  94 128   < > 332   TROPI  --   --   --  0.042 0.039  Canceled, Test credited 0.069*  --   --     < > = values in this interval not displayed.       Imaging:   No results found for this or any previous visit (from the past 24 hour(s)).

## 2019-03-24 NOTE — PLAN OF CARE
AVSS ex remains rapid aflutter/fib. Sats well on RA. +BS and BM on shift. Tolerating reg diet, denies nausea. Abd firm, mildly tender. Ambulating often with encouragement with assist 1, up in chair more comfortable. Amio gtt remains at 30. +3 scrotal and penile edema, +2 BLE edema. Lasix given. Good UOP. Main c/o flank/back pain. Relieved with increased ambulation, heat, tylenol, and atarax. Wife at bedside, very supportive-wishes to be phoned with any updates. Plan for possible cardioversion tomorrow. Wife would like to be notified for time of procedure.

## 2019-03-24 NOTE — PROGRESS NOTES
"Cardiology Progress Note   Date of service: 19       Assessment and Plan:   63 year old who presented with fever / nausea 3/19/2019 presumably from GI source. Being managed conservatively. Went into AF RVR shortly after admission. Echo with normal function. Low stroke risk. Continued rhythm on IV amiodarone started by Dr. Barros.      1. Atrial flutter RVR    Plan ELI and DCCV tomorrow    Start Eliquis 5mg po bid tonight as patient no having surgery planned.    30 event monitor on discharge.    May discontinue amiodarone IV if patient successfully cardioverts.                 Interval History:   Feeling a little better. WBC increasing. Advancing diet. Remains in atrial flutter / RVR and on IV amiodarone.              Review of Systems:   As per subjective, otherwise 5 systems reviewed and negative.           Physical Exam:     Vitals were reviewed  Blood pressure (!) 157/93, pulse 95, temperature 98.2  F (36.8  C), temperature source Oral, resp. rate 16, height 1.753 m (5' 9\"), weight 104.1 kg (229 lb 8 oz), SpO2 100 %.  Temperatures:  Current - Temp: 98.2  F (36.8  C); Max - Temp  Av  F (36.7  C)  Min: 97.6  F (36.4  C)  Max: 98.5  F (36.9  C)  Respiration range: Resp  Av  Min: 16  Max: 16  Pulse range: No Data Recorded  Blood pressure range: Systolic (24hrs), Av , Min:136 , Max:157   ; Diastolic (24hrs), Av, Min:93, Max:104    Pulse oximetry range: SpO2  Av.2 %  Min: 89 %  Max: 100 %    Intake/Output Summary (Last 24 hours) at 3/24/2019 1253  Last data filed at 3/24/2019 1200  Gross per 24 hour   Intake 1897 ml   Output 2900 ml   Net -1003 ml       Constitutional:   awake, sleepy, cooperative, no apparent distress, and appears stated age     Eyes:   Lids and lashes normal, pupils equal, round and reactive to light, extra ocular muscles intact, sclera clear, conjunctiva normal     Neck:   supple, symmetrical, trachea midline     Back:   symmetric     Lungs:   Coarse     Cardiovascular:   " Irreg, irreg, tachy     Abdomen:   benign     Musculoskeletal:   motor strength is 5 out of 5 all extremities bilaterally     Neurologic:   Grossly nonfocal     Skin:   normal skin color, texture, turgor            Medications:     Current Facility-Administered Medications Ordered in Epic   Medication Dose Route Frequency Last Rate Last Dose     acetaminophen (TYLENOL) Suppository 650 mg  650 mg Rectal Q4H PRN         acetaminophen (TYLENOL) tablet 650 mg  650 mg Oral Q4H PRN   650 mg at 03/24/19 0025     amiodarone in D5W adult drip (NEXTERONE) 250 MG/250ML IV infusion  0.5 mg/min Intravenous Continuous 30 mL/hr at 03/24/19 0950 0.5 mg/min at 03/24/19 0950     bisacodyl (DULCOLAX) EC tablet 5 mg  5 mg Oral Daily PRN        Or     bisacodyl (DULCOLAX) EC tablet 10 mg  10 mg Oral Daily PRN        Or     bisacodyl (DULCOLAX) EC tablet 15 mg  15 mg Oral Daily PRN         cefuroxime (CEFTIN) tablet 500 mg  500 mg Oral Q12H GARCIA         hydrOXYzine (ATARAX) tablet 25 mg  25 mg Oral Q6H PRN   25 mg at 03/23/19 1724    Or     hydrOXYzine (ATARAX) tablet 50 mg  50 mg Oral Q6H PRN         lidocaine (LMX4) cream   Topical Q1H PRN         lidocaine 1 % 0.1-1 mL  0.1-1 mL Other Q1H PRN         May continue current IV fluids if patient has IV fluids infusing.   Does not apply Continuous PRN         melatonin tablet 1 mg  1 mg Oral At Bedtime PRN   1 mg at 03/21/19 2130     metoclopramide (REGLAN) tablet 5 mg  5 mg Oral Q6H PRN        Or     metoclopramide (REGLAN) injection 5 mg  5 mg Intravenous Q6H PRN         metoprolol tartrate (LOPRESSOR) tablet 100 mg  100 mg Oral BID   100 mg at 03/24/19 0929     naloxone (NARCAN) injection 0.1-0.4 mg  0.1-0.4 mg Intravenous Q2 Min PRN         nitroGLYcerin (NITROSTAT) sublingual tablet 0.4 mg  0.4 mg Sublingual Q5 Min PRN         ondansetron (ZOFRAN-ODT) ODT tab 4 mg  4 mg Oral Q6H PRN        Or     ondansetron (ZOFRAN) injection 4 mg  4 mg Intravenous Q6H PRN         oxyCODONE  (ROXICODONE) tablet 5-10 mg  5-10 mg Oral Q3H PRN   5 mg at 03/22/19 1948     pantoprazole (PROTONIX) EC tablet 40 mg  40 mg Oral QAM AC   40 mg at 03/24/19 0638     polyethylene glycol (MIRALAX/GLYCOLAX) Packet 17 g  17 g Oral Daily PRN         potassium chloride ER (K-DUR/KLOR-CON M) CR tablet 40 mEq  40 mEq Oral Q4H         prochlorperazine (COMPAZINE) injection 5 mg  5 mg Intravenous Q6H PRN        Or     prochlorperazine (COMPAZINE) tablet 5 mg  5 mg Oral Q6H PRN        Or     prochlorperazine (COMPAZINE) Suppository 12.5 mg  12.5 mg Rectal Q12H PRN         sodium chloride (PF) 0.9% PF flush 3 mL  3 mL Intravenous q1 min prn         sodium chloride (PF) 0.9% PF flush 3 mL  3 mL Intracatheter q1 min prn         sodium chloride (PF) 0.9% PF flush 3 mL  3 mL Intracatheter Q8H   3 mL at 03/23/19 2043     No current [facility]-ordered outpatient medications on file.                Data:   All laboratory data reviewed  Results for orders placed or performed during the hospital encounter of 03/19/19 (from the past 24 hour(s))   Basic metabolic panel   Result Value Ref Range    Sodium 140 133 - 144 mmol/L    Potassium 2.9 (L) 3.4 - 5.3 mmol/L    Chloride 108 94 - 109 mmol/L    Carbon Dioxide 24 20 - 32 mmol/L    Anion Gap 8 3 - 14 mmol/L    Glucose 94 70 - 99 mg/dL    Urea Nitrogen 21 7 - 30 mg/dL    Creatinine 1.15 0.66 - 1.25 mg/dL    GFR Estimate 66 >60 mL/min/[1.73_m2]    GFR Estimate If Black 77 >60 mL/min/[1.73_m2]    Calcium 8.1 (L) 8.5 - 10.1 mg/dL   WBC count   Result Value Ref Range    WBC 15.7 (H) 4.0 - 11.0 10e9/L   Magnesium   Result Value Ref Range    Magnesium 1.8 1.6 - 2.3 mg/dL       All laboratory data reviewed  Lab Results   Component Value Date    CHOL 233 02/27/2015     Lab Results   Component Value Date    HDL 78 02/27/2015     Lab Results   Component Value Date     02/27/2015     Lab Results   Component Value Date    TRIG 66 02/27/2015     No results found for: CHOLHDLRATIO  No results found  for: TSH  Last Basic Metabolic Panel:  Lab Results   Component Value Date     03/24/2019      Lab Results   Component Value Date    POTASSIUM 2.9 03/24/2019     Lab Results   Component Value Date    CHLORIDE 108 03/24/2019     Lab Results   Component Value Date    LEANNA 8.1 03/24/2019     Lab Results   Component Value Date    CO2 24 03/24/2019     Lab Results   Component Value Date    BUN 21 03/24/2019     Lab Results   Component Value Date    CR 1.15 03/24/2019     Lab Results   Component Value Date    GLC 94 03/24/2019     Lab Results   Component Value Date    WBC 15.7 03/24/2019     Lab Results   Component Value Date    RBC 4.18 03/23/2019     Lab Results   Component Value Date    HGB 12.2 03/23/2019     Lab Results   Component Value Date    HCT 37.8 03/23/2019     Lab Results   Component Value Date    MCV 90 03/23/2019     Lab Results   Component Value Date    MCH 29.2 03/23/2019     Lab Results   Component Value Date    MCHC 32.3 03/23/2019     Lab Results   Component Value Date    RDW 14.8 03/23/2019     Lab Results   Component Value Date     03/23/2019

## 2019-03-25 ENCOUNTER — APPOINTMENT (OUTPATIENT)
Dept: CARDIOLOGY | Facility: CLINIC | Age: 64
DRG: 871 | End: 2019-03-25
Attending: INTERNAL MEDICINE
Payer: COMMERCIAL

## 2019-03-25 ENCOUNTER — ANESTHESIA (OUTPATIENT)
Dept: SURGERY | Facility: CLINIC | Age: 64
DRG: 871 | End: 2019-03-25
Payer: COMMERCIAL

## 2019-03-25 ENCOUNTER — ANESTHESIA EVENT (OUTPATIENT)
Dept: SURGERY | Facility: CLINIC | Age: 64
DRG: 871 | End: 2019-03-25
Payer: COMMERCIAL

## 2019-03-25 LAB
ALBUMIN SERPL-MCNC: 1.8 G/DL (ref 3.4–5)
ALP SERPL-CCNC: 113 U/L (ref 40–150)
ALT SERPL W P-5'-P-CCNC: 24 U/L (ref 0–70)
ANION GAP SERPL CALCULATED.3IONS-SCNC: 7 MMOL/L (ref 3–14)
AST SERPL W P-5'-P-CCNC: 33 U/L (ref 0–45)
BACTERIA SPEC CULT: NO GROWTH
BACTERIA SPEC CULT: NO GROWTH
BILIRUB DIRECT SERPL-MCNC: 0.3 MG/DL (ref 0–0.2)
BILIRUB SERPL-MCNC: 0.8 MG/DL (ref 0.2–1.3)
BUN SERPL-MCNC: 19 MG/DL (ref 7–30)
CALCIUM SERPL-MCNC: 8.1 MG/DL (ref 8.5–10.1)
CHLORIDE SERPL-SCNC: 110 MMOL/L (ref 94–109)
CO2 SERPL-SCNC: 23 MMOL/L (ref 20–32)
CREAT SERPL-MCNC: 1.07 MG/DL (ref 0.66–1.25)
ERYTHROCYTE [DISTWIDTH] IN BLOOD BY AUTOMATED COUNT: 14.6 % (ref 10–15)
GFR SERPL CREATININE-BSD FRML MDRD: 72 ML/MIN/{1.73_M2}
GLUCOSE SERPL-MCNC: 95 MG/DL (ref 70–99)
HCT VFR BLD AUTO: 39.1 % (ref 40–53)
HGB BLD-MCNC: 13.5 G/DL (ref 13.3–17.7)
INTERPRETATION ECG - MUSE: NORMAL
LIPASE SERPL-CCNC: 2875 U/L (ref 73–393)
Lab: NORMAL
Lab: NORMAL
MAGNESIUM SERPL-MCNC: 1.9 MG/DL (ref 1.6–2.3)
MCH RBC QN AUTO: 29.9 PG (ref 26.5–33)
MCHC RBC AUTO-ENTMCNC: 34.5 G/DL (ref 31.5–36.5)
MCV RBC AUTO: 87 FL (ref 78–100)
PLATELET # BLD AUTO: 282 10E9/L (ref 150–450)
POTASSIUM SERPL-SCNC: 3.8 MMOL/L (ref 3.4–5.3)
PROT SERPL-MCNC: 6.7 G/DL (ref 6.8–8.8)
RBC # BLD AUTO: 4.51 10E12/L (ref 4.4–5.9)
SODIUM SERPL-SCNC: 140 MMOL/L (ref 133–144)
SPECIMEN SOURCE: NORMAL
SPECIMEN SOURCE: NORMAL
WBC # BLD AUTO: 16.4 10E9/L (ref 4–11)

## 2019-03-25 PROCEDURE — 25000128 H RX IP 250 OP 636: Performed by: INTERNAL MEDICINE

## 2019-03-25 PROCEDURE — 93010 ELECTROCARDIOGRAM REPORT: CPT | Performed by: INTERNAL MEDICINE

## 2019-03-25 PROCEDURE — 40000857 ZZH STATISTIC TEE INCLUDES SEDATION

## 2019-03-25 PROCEDURE — A9270 NON-COVERED ITEM OR SERVICE: HCPCS | Performed by: INTERNAL MEDICINE

## 2019-03-25 PROCEDURE — 99232 SBSQ HOSP IP/OBS MODERATE 35: CPT | Mod: 25 | Performed by: INTERNAL MEDICINE

## 2019-03-25 PROCEDURE — 40000235 ZZH STATISTIC TELEMETRY

## 2019-03-25 PROCEDURE — 25000132 ZZH RX MED GY IP 250 OP 250 PS 637: Performed by: INTERNAL MEDICINE

## 2019-03-25 PROCEDURE — 99233 SBSQ HOSP IP/OBS HIGH 50: CPT | Performed by: INTERNAL MEDICINE

## 2019-03-25 PROCEDURE — 80048 BASIC METABOLIC PNL TOTAL CA: CPT | Performed by: INTERNAL MEDICINE

## 2019-03-25 PROCEDURE — 93312 ECHO TRANSESOPHAGEAL: CPT | Mod: 26 | Performed by: INTERNAL MEDICINE

## 2019-03-25 PROCEDURE — 93005 ELECTROCARDIOGRAM TRACING: CPT

## 2019-03-25 PROCEDURE — 83690 ASSAY OF LIPASE: CPT | Performed by: INTERNAL MEDICINE

## 2019-03-25 PROCEDURE — 93321 DOPPLER ECHO F-UP/LMTD STD: CPT | Mod: 26 | Performed by: INTERNAL MEDICINE

## 2019-03-25 PROCEDURE — 25000128 H RX IP 250 OP 636: Performed by: NURSE ANESTHETIST, CERTIFIED REGISTERED

## 2019-03-25 PROCEDURE — 93320 DOPPLER ECHO COMPLETE: CPT

## 2019-03-25 PROCEDURE — 80076 HEPATIC FUNCTION PANEL: CPT | Performed by: INTERNAL MEDICINE

## 2019-03-25 PROCEDURE — 85027 COMPLETE CBC AUTOMATED: CPT | Performed by: INTERNAL MEDICINE

## 2019-03-25 PROCEDURE — 37000008 ZZH ANESTHESIA TECHNICAL FEE, 1ST 30 MIN

## 2019-03-25 PROCEDURE — 25800030 ZZH RX IP 258 OP 636: Performed by: INTERNAL MEDICINE

## 2019-03-25 PROCEDURE — 25000125 ZZHC RX 250: Performed by: INTERNAL MEDICINE

## 2019-03-25 PROCEDURE — 25000128 H RX IP 250 OP 636: Performed by: NURSE PRACTITIONER

## 2019-03-25 PROCEDURE — 83735 ASSAY OF MAGNESIUM: CPT | Performed by: INTERNAL MEDICINE

## 2019-03-25 PROCEDURE — 21000001 ZZH R&B HEART CARE

## 2019-03-25 PROCEDURE — 93325 DOPPLER ECHO COLOR FLOW MAPG: CPT | Mod: 26 | Performed by: INTERNAL MEDICINE

## 2019-03-25 PROCEDURE — 25800030 ZZH RX IP 258 OP 636: Performed by: NURSE PRACTITIONER

## 2019-03-25 PROCEDURE — 36415 COLL VENOUS BLD VENIPUNCTURE: CPT | Performed by: INTERNAL MEDICINE

## 2019-03-25 RX ORDER — METOPROLOL TARTRATE 50 MG
50 TABLET ORAL 2 TIMES DAILY
Status: DISCONTINUED | OUTPATIENT
Start: 2019-03-25 | End: 2019-03-28 | Stop reason: HOSPADM

## 2019-03-25 RX ORDER — NALOXONE HYDROCHLORIDE 0.4 MG/ML
.1-.4 INJECTION, SOLUTION INTRAMUSCULAR; INTRAVENOUS; SUBCUTANEOUS
Status: DISCONTINUED | OUTPATIENT
Start: 2019-03-25 | End: 2019-03-25 | Stop reason: HOSPADM

## 2019-03-25 RX ORDER — FENTANYL CITRATE 50 UG/ML
25-50 INJECTION, SOLUTION INTRAMUSCULAR; INTRAVENOUS
Status: COMPLETED | OUTPATIENT
Start: 2019-03-25 | End: 2019-03-25

## 2019-03-25 RX ORDER — POTASSIUM CHLORIDE 1500 MG/1
20 TABLET, EXTENDED RELEASE ORAL
Status: DISCONTINUED | OUTPATIENT
Start: 2019-03-25 | End: 2019-03-25 | Stop reason: HOSPADM

## 2019-03-25 RX ORDER — PROPOFOL 10 MG/ML
INJECTION, EMULSION INTRAVENOUS PRN
Status: DISCONTINUED | OUTPATIENT
Start: 2019-03-25 | End: 2019-03-25

## 2019-03-25 RX ORDER — MAGNESIUM SULFATE HEPTAHYDRATE 40 MG/ML
2 INJECTION, SOLUTION INTRAVENOUS
Status: DISCONTINUED | OUTPATIENT
Start: 2019-03-25 | End: 2019-03-25 | Stop reason: HOSPADM

## 2019-03-25 RX ORDER — POTASSIUM CHLORIDE 1500 MG/1
40 TABLET, EXTENDED RELEASE ORAL
Status: DISCONTINUED | OUTPATIENT
Start: 2019-03-25 | End: 2019-03-25 | Stop reason: HOSPADM

## 2019-03-25 RX ORDER — HYDRALAZINE HYDROCHLORIDE 25 MG/1
25 TABLET, FILM COATED ORAL 3 TIMES DAILY PRN
Status: DISCONTINUED | OUTPATIENT
Start: 2019-03-25 | End: 2019-03-28 | Stop reason: HOSPADM

## 2019-03-25 RX ORDER — ATROPINE SULFATE 0.1 MG/ML
.5-1 INJECTION INTRAVENOUS
Status: DISCONTINUED | OUTPATIENT
Start: 2019-03-25 | End: 2019-03-25 | Stop reason: HOSPADM

## 2019-03-25 RX ORDER — FLUMAZENIL 0.1 MG/ML
0.2 INJECTION, SOLUTION INTRAVENOUS
Status: DISCONTINUED | OUTPATIENT
Start: 2019-03-25 | End: 2019-03-25 | Stop reason: HOSPADM

## 2019-03-25 RX ORDER — LIDOCAINE HYDROCHLORIDE 40 MG/ML
1.5 SOLUTION TOPICAL ONCE
Status: COMPLETED | OUTPATIENT
Start: 2019-03-25 | End: 2019-03-25

## 2019-03-25 RX ORDER — POTASSIUM CHLORIDE 1500 MG/1
20 TABLET, EXTENDED RELEASE ORAL
Status: COMPLETED | OUTPATIENT
Start: 2019-03-25 | End: 2019-03-25

## 2019-03-25 RX ORDER — GLYCOPYRROLATE 0.2 MG/ML
0.1 INJECTION, SOLUTION INTRAMUSCULAR; INTRAVENOUS ONCE
Status: COMPLETED | OUTPATIENT
Start: 2019-03-25 | End: 2019-03-25

## 2019-03-25 RX ORDER — FENTANYL CITRATE 50 UG/ML
25 INJECTION, SOLUTION INTRAMUSCULAR; INTRAVENOUS
Status: DISCONTINUED | OUTPATIENT
Start: 2019-03-25 | End: 2019-03-25 | Stop reason: HOSPADM

## 2019-03-25 RX ORDER — SODIUM CHLORIDE 9 MG/ML
1000 INJECTION, SOLUTION INTRAVENOUS CONTINUOUS
Status: DISCONTINUED | OUTPATIENT
Start: 2019-03-25 | End: 2019-03-25 | Stop reason: HOSPADM

## 2019-03-25 RX ORDER — FUROSEMIDE 40 MG
40 TABLET ORAL DAILY
Status: DISCONTINUED | OUTPATIENT
Start: 2019-03-25 | End: 2019-03-28 | Stop reason: HOSPADM

## 2019-03-25 RX ORDER — MAGNESIUM SULFATE HEPTAHYDRATE 40 MG/ML
2 INJECTION, SOLUTION INTRAVENOUS
Status: COMPLETED | OUTPATIENT
Start: 2019-03-25 | End: 2019-03-25

## 2019-03-25 RX ADMIN — POTASSIUM CHLORIDE 20 MEQ: 1500 TABLET, EXTENDED RELEASE ORAL at 08:50

## 2019-03-25 RX ADMIN — AMIODARONE HYDROCHLORIDE 0.5 MG/MIN: 50 INJECTION, SOLUTION INTRAVENOUS at 05:03

## 2019-03-25 RX ADMIN — CEFUROXIME AXETIL 500 MG: 500 TABLET ORAL at 08:45

## 2019-03-25 RX ADMIN — ACETAMINOPHEN 650 MG: 325 TABLET, FILM COATED ORAL at 00:47

## 2019-03-25 RX ADMIN — METOPROLOL TARTRATE 50 MG: 50 TABLET ORAL at 20:41

## 2019-03-25 RX ADMIN — APIXABAN 5 MG: 5 TABLET, FILM COATED ORAL at 13:21

## 2019-03-25 RX ADMIN — GLYCOPYRROLATE 0.1 MG: 0.2 INJECTION, SOLUTION INTRAMUSCULAR; INTRAVENOUS at 09:53

## 2019-03-25 RX ADMIN — FUROSEMIDE 40 MG: 40 TABLET ORAL at 08:45

## 2019-03-25 RX ADMIN — FENTANYL CITRATE 50 MCG: 50 INJECTION, SOLUTION INTRAMUSCULAR; INTRAVENOUS at 11:08

## 2019-03-25 RX ADMIN — PANTOPRAZOLE SODIUM 40 MG: 40 TABLET, DELAYED RELEASE ORAL at 06:42

## 2019-03-25 RX ADMIN — LIDOCAINE HYDROCHLORIDE 1.5 ML: 40 SOLUTION TOPICAL at 09:53

## 2019-03-25 RX ADMIN — APIXABAN 5 MG: 5 TABLET, FILM COATED ORAL at 20:37

## 2019-03-25 RX ADMIN — TOPICAL ANESTHETIC 0.5 ML: 200 SPRAY DENTAL; PERIODONTAL at 10:55

## 2019-03-25 RX ADMIN — CEFUROXIME AXETIL 500 MG: 500 TABLET ORAL at 20:37

## 2019-03-25 RX ADMIN — MIDAZOLAM HYDROCHLORIDE 2 MG: 1 INJECTION, SOLUTION INTRAMUSCULAR; INTRAVENOUS at 11:06

## 2019-03-25 RX ADMIN — PROPOFOL 100 MG: 10 INJECTION, EMULSION INTRAVENOUS at 11:22

## 2019-03-25 RX ADMIN — SODIUM CHLORIDE 1000 ML: 9 INJECTION, SOLUTION INTRAVENOUS at 09:55

## 2019-03-25 RX ADMIN — MAGNESIUM SULFATE HEPTAHYDRATE 2 G: 40 INJECTION, SOLUTION INTRAVENOUS at 07:13

## 2019-03-25 ASSESSMENT — ENCOUNTER SYMPTOMS
DYSRHYTHMIAS: 1
SEIZURES: 0

## 2019-03-25 ASSESSMENT — ACTIVITIES OF DAILY LIVING (ADL)
ADLS_ACUITY_SCORE: 14
ADLS_ACUITY_SCORE: 14
ADLS_ACUITY_SCORE: 13
ADLS_ACUITY_SCORE: 13
ADLS_ACUITY_SCORE: 14

## 2019-03-25 ASSESSMENT — MIFFLIN-ST. JEOR: SCORE: 1793.38

## 2019-03-25 NOTE — PROGRESS NOTES
"Cardiology Progress Note   Date of service: 19       Assessment and Plan:     63 year old who presented with fever / nausea 3/19/2019 presumably from GI source. Being managed conservatively. Went into AF RVR shortly after admission. Echo with normal function. Low stroke risk. Continued rhythm on IV amiodarone started by Dr. Barros.        1. Atrial flutter RVR    Plan ELI and DCCV today    Eliquis 5mg po bid started yesterday as patient no having surgery planned.    30 event monitor on discharge.    May discontinue amiodarone IV if patient successfully cardioverts.    Follow up in cardiology clinic 2 weeks with Rosemary Patel or Migdalia Hoffman.      2. Increasing WBC count    Left antecub IV site erythematous, marked for possible cellulitis.    Initial presentation was with fever and nausea.    Infectious workup ongoing.                   Interval History:   Sleepy. Feels about the same. WBC count increasing.              Review of Systems:   As per subjective, otherwise 5 systems reviewed and negative.           Physical Exam:     Vitals were reviewed  Blood pressure (!) 131/93, pulse 72, temperature 98.2  F (36.8  C), temperature source Oral, resp. rate 20, height 1.753 m (5' 9\"), weight 101.8 kg (224 lb 6.9 oz), SpO2 94 %.  Temperatures:  Current - Temp: 98.2  F (36.8  C); Max - Temp  Av.5  F (36.9  C)  Min: 98.2  F (36.8  C)  Max: 99.1  F (37.3  C)  Respiration range: Resp  Av  Min: 16  Max: 20  Pulse range: Pulse  Av  Min: 72  Max: 102  Blood pressure range: Systolic (24hrs), Av , Min:120 , Max:157   ; Diastolic (24hrs), Av, Min:85, Max:100    Pulse oximetry range: SpO2  Av.2 %  Min: 94 %  Max: 100 %    Intake/Output Summary (Last 24 hours) at 3/25/2019 0818  Last data filed at 3/25/2019 0500  Gross per 24 hour   Intake 250 ml   Output 2850 ml   Net -2600 ml       Constitutional:   awake, alert, cooperative, no apparent distress, and appears stated age     Eyes:   Lids and lashes " normal, pupils equal, round and reactive to light, extra ocular muscles intact, sclera clear, conjunctiva normal     Neck:   supple, symmetrical, trachea midline     Back:   symmetric     Musculoskeletal:   motor strength is 5 out of 5 all extremities bilaterally     Neurologic:   Grossly nonfocal     Skin:   Erythema L antecub             Medications:     Current Facility-Administered Medications Ordered in Epic   Medication Dose Route Frequency Last Rate Last Dose     acetaminophen (TYLENOL) Suppository 650 mg  650 mg Rectal Q4H PRN         acetaminophen (TYLENOL) tablet 650 mg  650 mg Oral Q4H PRN   650 mg at 03/25/19 0047     amiodarone in D5W adult drip (NEXTERONE) 250 MG/250ML IV infusion  0.5 mg/min Intravenous Continuous 30 mL/hr at 03/25/19 0503 0.5 mg/min at 03/25/19 0503     apixaban ANTICOAGULANT (ELIQUIS) tablet 5 mg  5 mg Oral BID   5 mg at 03/24/19 2030     atropine injection 0.5-1 mg  0.5-1 mg Intravenous Once PRN         benzocaine 20% (HURRICAINE/TOPEX) 20 % spray 0.5-2 mL  1-4 spray Mouth/Throat Once         bisacodyl (DULCOLAX) EC tablet 5 mg  5 mg Oral Daily PRN        Or     bisacodyl (DULCOLAX) EC tablet 10 mg  10 mg Oral Daily PRN        Or     bisacodyl (DULCOLAX) EC tablet 15 mg  15 mg Oral Daily PRN         cefuroxime (CEFTIN) tablet 500 mg  500 mg Oral Q12H GARCIA   500 mg at 03/24/19 2030     fentaNYL (PF) (SUBLIMAZE) injection 25-50 mcg  25-50 mcg Intravenous Once within 24 hrs        Followed by     fentaNYL (PF) (SUBLIMAZE) injection 25 mcg  25 mcg Intravenous Q2 Min PRN         flumazenil (ROMAZICON) injection 0.2 mg  0.2 mg Intravenous q1 min prn         furosemide (LASIX) tablet 40 mg  40 mg Oral Daily         Give   of usual dose of LONG ACTING insulin AM of procedure IF diabetic   Does not apply Continuous PRN         glycopyrrolate (ROBINUL) injection 0.1 mg  0.1 mg Intravenous Once         HOLD digoxin (LANOXIN)  AM of procedure IF on digoxin   Does not apply HOLD         HOLD:  Insulin - RAPID/SHORT acting AM of procedure IF diabetic   Does not apply HOLD         HOLD: Insulin - REGULAR AM of procedure IF diabetic   Does not apply HOLD         HOLD: Oral hypoglycemics AM of procedure IF diabetic   Does not apply HOLD         hydrOXYzine (ATARAX) tablet 25 mg  25 mg Oral Q6H PRN   25 mg at 03/24/19 1312    Or     hydrOXYzine (ATARAX) tablet 50 mg  50 mg Oral Q6H PRN         lidocaine (LMX4) cream   Topical Q1H PRN         lidocaine (XYLOCAINE) 4 % solution 1.5 mL  1.5 mL Topical Once         lidocaine 1 % 0.1-1 mL  0.1-1 mL Other Q1H PRN         magnesium sulfate 2 g in water intermittent infusion  2 g Intravenous Q1H PRN         May continue current IV fluids if patient has IV fluids infusing.   Does not apply Continuous PRN         May take oral meds with a sip of water, the morning of Cardioversion procedure.       Does not apply Continuous PRN         melatonin tablet 1 mg  1 mg Oral At Bedtime PRN   1 mg at 03/21/19 2130     metoclopramide (REGLAN) tablet 5 mg  5 mg Oral Q6H PRN        Or     metoclopramide (REGLAN) injection 5 mg  5 mg Intravenous Q6H PRN         metoprolol tartrate (LOPRESSOR) tablet 100 mg  100 mg Oral BID   100 mg at 03/24/19 2030     midazolam (VERSED) injection 0.5-1 mg  0.5-1 mg Intravenous Once within 24 hrs        Followed by     midazolam (VERSED) injection 0.5 mg  0.5 mg Intravenous Q2 Min PRN         naloxone (NARCAN) injection 0.1-0.4 mg  0.1-0.4 mg Intravenous Q2 Min PRN         naloxone (NARCAN) injection 0.1-0.4 mg  0.1-0.4 mg Intravenous Q2 Min PRN         nitroGLYcerin (NITROSTAT) sublingual tablet 0.4 mg  0.4 mg Sublingual Q5 Min PRN         ondansetron (ZOFRAN-ODT) ODT tab 4 mg  4 mg Oral Q6H PRN        Or     ondansetron (ZOFRAN) injection 4 mg  4 mg Intravenous Q6H PRN         oxyCODONE (ROXICODONE) tablet 5-10 mg  5-10 mg Oral Q3H PRN   5 mg at 03/22/19 1948     pantoprazole (PROTONIX) EC tablet 40 mg  40 mg Oral QAM AC   40 mg at 03/25/19 0642      polyethylene glycol (MIRALAX/GLYCOLAX) Packet 17 g  17 g Oral Daily PRN         potassium chloride ER (K-DUR/KLOR-CON M) CR tablet 20 mEq  20 mEq Oral Once PRN         potassium chloride ER (K-DUR/KLOR-CON M) CR tablet 20 mEq  20 mEq Oral Q1H PRN         potassium chloride ER (K-DUR/KLOR-CON M) CR tablet 40 mEq  40 mEq Oral Once PRN         potassium chloride ER (K-DUR/KLOR-CON M) CR tablet 40 mEq  40 mEq Oral Q1H PRN         prochlorperazine (COMPAZINE) injection 5 mg  5 mg Intravenous Q6H PRN        Or     prochlorperazine (COMPAZINE) tablet 5 mg  5 mg Oral Q6H PRN        Or     prochlorperazine (COMPAZINE) Suppository 12.5 mg  12.5 mg Rectal Q12H PRN         sodium chloride (PF) 0.9% PF flush 3 mL  3 mL Intravenous Q1H PRN         sodium chloride (PF) 0.9% PF flush 3 mL  3 mL Intravenous Q8H         sodium chloride (PF) 0.9% PF flush 3 mL  3 mL Intravenous q1 min prn         sodium chloride (PF) 0.9% PF flush 3 mL  3 mL Intracatheter q1 min prn         sodium chloride (PF) 0.9% PF flush 3 mL  3 mL Intracatheter Q8H   3 mL at 03/23/19 2043     sodium chloride (PF) 0.9% PF flush 9.5 mL  9.5 mL Intravenous q1 min prn         sodium chloride 0.9% infusion  1,000 mL Intravenous Continuous         No current [facility]-ordered outpatient medications on file.                Data:   All laboratory data reviewed  Results for orders placed or performed during the hospital encounter of 03/19/19 (from the past 24 hour(s))   Basic metabolic panel   Result Value Ref Range    Sodium 140 133 - 144 mmol/L    Potassium 2.9 (L) 3.4 - 5.3 mmol/L    Chloride 108 94 - 109 mmol/L    Carbon Dioxide 24 20 - 32 mmol/L    Anion Gap 8 3 - 14 mmol/L    Glucose 94 70 - 99 mg/dL    Urea Nitrogen 21 7 - 30 mg/dL    Creatinine 1.15 0.66 - 1.25 mg/dL    GFR Estimate 66 >60 mL/min/[1.73_m2]    GFR Estimate If Black 77 >60 mL/min/[1.73_m2]    Calcium 8.1 (L) 8.5 - 10.1 mg/dL   WBC count   Result Value Ref Range    WBC 15.7 (H) 4.0 - 11.0 10e9/L    Magnesium   Result Value Ref Range    Magnesium 1.8 1.6 - 2.3 mg/dL   Lactic acid level STAT for sepsis protocol   Result Value Ref Range    Lactate for Sepsis Protocol 1.3 0.7 - 2.0 mmol/L   Basic metabolic panel   Result Value Ref Range    Sodium 140 133 - 144 mmol/L    Potassium 3.8 3.4 - 5.3 mmol/L    Chloride 110 (H) 94 - 109 mmol/L    Carbon Dioxide 23 20 - 32 mmol/L    Anion Gap 7 3 - 14 mmol/L    Glucose 95 70 - 99 mg/dL    Urea Nitrogen 19 7 - 30 mg/dL    Creatinine 1.07 0.66 - 1.25 mg/dL    GFR Estimate 72 >60 mL/min/[1.73_m2]    GFR Estimate If Black 84 >60 mL/min/[1.73_m2]    Calcium 8.1 (L) 8.5 - 10.1 mg/dL   CBC with platelets   Result Value Ref Range    WBC 16.4 (H) 4.0 - 11.0 10e9/L    RBC Count 4.51 4.4 - 5.9 10e12/L    Hemoglobin 13.5 13.3 - 17.7 g/dL    Hematocrit 39.1 (L) 40.0 - 53.0 %    MCV 87 78 - 100 fl    MCH 29.9 26.5 - 33.0 pg    MCHC 34.5 31.5 - 36.5 g/dL    RDW 14.6 10.0 - 15.0 %    Platelet Count 282 150 - 450 10e9/L   Magnesium   Result Value Ref Range    Magnesium 1.9 1.6 - 2.3 mg/dL       All laboratory data reviewed  Lab Results   Component Value Date    CHOL 233 02/27/2015     Lab Results   Component Value Date    HDL 78 02/27/2015     Lab Results   Component Value Date     02/27/2015     Lab Results   Component Value Date    TRIG 66 02/27/2015     No results found for: CHOLHDLRATIO  No results found for: TSH  Last Basic Metabolic Panel:  Lab Results   Component Value Date     03/25/2019      Lab Results   Component Value Date    POTASSIUM 3.8 03/25/2019     Lab Results   Component Value Date    CHLORIDE 110 03/25/2019     Lab Results   Component Value Date    LEANNA 8.1 03/25/2019     Lab Results   Component Value Date    CO2 23 03/25/2019     Lab Results   Component Value Date    BUN 19 03/25/2019     Lab Results   Component Value Date    CR 1.07 03/25/2019     Lab Results   Component Value Date    GLC 95 03/25/2019     Lab Results   Component Value Date    WBC 16.4  03/25/2019     Lab Results   Component Value Date    RBC 4.51 03/25/2019     Lab Results   Component Value Date    HGB 13.5 03/25/2019     Lab Results   Component Value Date    HCT 39.1 03/25/2019     Lab Results   Component Value Date    MCV 87 03/25/2019     Lab Results   Component Value Date    MCH 29.9 03/25/2019     Lab Results   Component Value Date    MCHC 34.5 03/25/2019     Lab Results   Component Value Date    RDW 14.6 03/25/2019     Lab Results   Component Value Date     03/25/2019

## 2019-03-25 NOTE — ANESTHESIA PREPROCEDURE EVALUATION
Anesthesia Pre-Procedure Evaluation    Patient: Sanjeev Blackmon   MRN: [id number] : 1955          Preoperative Diagnosis: SEPSIS.    Procedure(s):  ELI, CARDIOVERSION.    Past Medical History:   Diagnosis Date     Nasal polyps 2012     Past Surgical History:   Procedure Laterality Date     ABLATE VEIN VARICOSE RADIO FREQUENCY WITH PHLEBECTOMY MULTIPLE STAB      Dr. Do     Scleral lesion excision       SINUS SURGERY       SINUS SURGERY       SINUS SURGERY       SINUS SURGERY      Dr. Samson       Anesthesia Evaluation     .             ROS/MED HX    ENT/Pulmonary:     (+), recent URI . .   (-) sleep apnea   Neurologic:      (-) seizures and CVA   Cardiovascular:     (+) ----. : . . . :. dysrhythmias a-fib, .      (-) hypertension   METS/Exercise Tolerance:  >4 METS   Hematologic:         Musculoskeletal:         GI/Hepatic:     (+) GERD Asymptomatic on medication,      (-) liver disease   Renal/Genitourinary:     (+) chronic renal disease, type: ARF,       Endo:      (-) Type II DM and thyroid disease   Psychiatric:         Infectious Disease:         Malignancy:         Other:                          Physical Exam  Normal systems: pulmonary and dental    Airway   Mallampati: II  TM distance: >3 FB  Neck ROM: full    Dental     Cardiovascular   Rhythm and rate: irregular and abnormal      Pulmonary             Lab Results   Component Value Date    WBC 16.4 (H) 2019    HGB 13.5 2019    HCT 39.1 (L) 2019     2019     2019    POTASSIUM 3.8 2019    CHLORIDE 110 (H) 2019    CO2 23 2019    BUN 19 2019    CR 1.07 2019    GLC 95 2019    LEANNA 8.1 (L) 2019    MAG 1.9 2019    ALBUMIN 1.9 (L) 2019    PROTTOTAL 6.1 (L) 2019    ALT 17 2019    AST 22 2019    ALKPHOS 41 2019    BILITOTAL 1.0 2019    LIPASE 94 2019    INR 1.39 (H) 2019       Preop  "Vitals  BP Readings from Last 3 Encounters:   03/25/19 (!) 131/93   02/27/15 118/78   12/17/12 134/80    Pulse Readings from Last 3 Encounters:   03/25/19 72   02/27/15 68      Resp Readings from Last 3 Encounters:   03/25/19 20    SpO2 Readings from Last 3 Encounters:   03/25/19 94%      Temp Readings from Last 1 Encounters:   03/25/19 36.8  C (98.2  F) (Oral)    Ht Readings from Last 1 Encounters:   03/19/19 1.753 m (5' 9\")      Wt Readings from Last 1 Encounters:   03/25/19 101.8 kg (224 lb 6.9 oz)    Estimated body mass index is 33.14 kg/m  as calculated from the following:    Height as of this encounter: 1.753 m (5' 9\").    Weight as of this encounter: 101.8 kg (224 lb 6.9 oz).       Anesthesia Plan      History & Physical Review  History and physical reviewed and following examination; no interval change.    ASA Status:  3 .    NPO Status:  > 8 hours    Plan for General with Propofol induction.          Postoperative Care      Consents  Anesthetic plan, risks, benefits and alternatives discussed with:  Patient..                 Romeo Eid MD  "

## 2019-03-25 NOTE — PLAN OF CARE
Patient A&O x4. Lethargic through night. VS elevated, Bp slightly high, HR consistently 130's-140's. No C/o CP, or SOB. Edema to BLE, and UE. Scrotum has edema as well. Some c/o pain with tylenol given for relief. Good UOP. BS active, CMS intact. Mg replaced this am before cardioversion. K+ replaced yesterday for K+ of 2.9.  Tele: in Aflutter w/ RVR. Plan is for cardioversion this am.

## 2019-03-25 NOTE — PLAN OF CARE
"Pt A/O. VSS. Up with sba/ind. Tele shows SR. Pt denies any CP or SOB. CV today, 1 shock to SR. Back on regular diet per GI. 2 extravasation sites on lt arm, treated with elevation and ice, borders marked. Pt has no new complaints.    BP (!) 138/99 (BP Location: Left arm)   Pulse 96   Temp 98  F (36.7  C) (Oral)   Resp 18   Ht 1.753 m (5' 9\")   Wt 101.8 kg (224 lb 6.9 oz)   SpO2 94%   BMI 33.14 kg/m        "

## 2019-03-25 NOTE — PROGRESS NOTES
[facility]    Hospitalist Progress Note    Assessment & Plan   Sanjeev Blackmon is a 63 year-old male admitted on 3/19/2019 after presenting with 1 day of dry heaves with epigastrium pain, and fevers to 102F, clinical picture consistent with sepsis most likely due to intra-abdominal pathology based on admission CT findings.       Impression:   Principal Problem:    Sepsis -- ?source (?intra-abdominal vs related to pneumonia with ileus)   -- improving   -- sludge in gall bladder appears incidental (Negative Hepatobiliary scan)   -- WBC improving   -- will stop IV Dilaudid (may be contributing to ileus)    Active Problems:    Atrial flutter with rapid ventricular response    -- still on IV Amiodarone   -- Metoprolol 50 mg bid   -- ELI and successful cardioversion today   -- [id number] Score 1 (1.3% annual stroke risk --low)      Gastroesophageal reflux disease with esophagitis   -- may have been contributing to epigastric abdominal pain   -- improved with PPI    BRANDEN (acute kidney injury) -- resolved    Pneumonia LLL   -- PO Ceftin      Plan:  Check CBC in AM, anticipate home tomorrow if continues to do well.     DVT Prophylaxis: Pneumatic Compression Devices  Code Status: Full Code    Disposition: Expected discharge in 1 day to home    Samson Jacobs MD  Pager [phone]  Cell Phone [phone]  Text Page (7am to 6pm)    Interval History   Feels ok, no SOB, slight cough but no sputum production.  Bowels moving.      Physical Exam   Temp: 100  F (37.8  C) Temp src: Axillary BP: (!) 146/98 Pulse: 86 Heart Rate: 102 Resp: 20 SpO2: 94 % O2 Device: None (Room air) Oxygen Delivery: 3 LPM  Vitals:    03/23/19 0541 03/24/19 0525 03/25/19 0519   Weight: 102.9 kg (226 lb 12.8 oz) 104.1 kg (229 lb 8 oz) 101.8 kg (224 lb 6.9 oz)     Vital Signs with Ranges  Temp:  [98.2  F (36.8  C)-100  F (37.8  C)] 100  F (37.8  C)  Pulse:  [] 86  Heart Rate:  [] 102  Resp:  [16-30] 20  BP:  (108-155)/() 146/98  SpO2:  [93 %-97 %] 94 %  I/O last 3 completed shifts:  In: 90 [I.V.:90]  Out: 1150 [Urine:1150]    # Pain Assessment:  Current Pain Score 3/25/2019   Patient currently in pain? denies   Pain score (0-10) -   Sanjeev villalobos pain level was assessed and he currently denies pain.        Constitutional: Awake, alert, cooperative, no apparent distress  Respiratory: Clear to auscultation bilaterally, no crackles or wheezing  Cardiovascular: Regular rate and rhythm, normal S1 and S2, and no murmur noted  GI: mildly distended, non-tender to palpation, bowel sounds present but decreased   Extrem: No calf tenderness, no ankle edema  Neuro: Ox3, no focal motor or sensory deficits    Medications     amiodarone 0.5 mg/min (03/25/19 0700)     - MEDICATION INSTRUCTIONS -         apixaban ANTICOAGULANT  5 mg Oral BID     cefuroxime  500 mg Oral Q12H GARCIA     furosemide  40 mg Oral Daily     metoprolol tartrate  100 mg Oral BID     pantoprazole  40 mg Oral QAM AC     sodium chloride (PF)  3 mL Intracatheter Q8H       Data   Recent Labs   Lab 03/25/19  0724 03/24/19  1039 03/23/19  0550 03/22/19  0553 03/21/19  0947 03/21/19  0628 03/21/19  0143   WBC 16.4* 15.7* 11.4* 15.0*  --  14.6*  --    HGB 13.5  --  12.2* 11.6*  --  11.8*  --    MCV 87  --  90 90  --  91  --      --  151 136*  --  127*  --    INR  --   --   --   --   --   --  1.39*    140  --  140  --  142 142   POTASSIUM 3.8 2.9*  --  3.6  --  3.7 4.1   CHLORIDE 110* 108  --  112*  --  114* 111*   CO2 23 24  --  23  --  20 24   BUN 19 21  --  29  --  32* 35*   CR 1.07 1.15  --  1.23  --  1.28* 1.50*   ANIONGAP 7 8  --  5  --  8 7   LEANNA 8.1* 8.1*  --  8.0*  --  7.9* 8.1*   GLC 95 94  --  92  --  108* 78   ALBUMIN 1.8*  --   --   --   --  1.9*  --    PROTTOTAL 6.7*  --   --   --   --  6.1*  --    BILITOTAL 0.8  --   --   --   --  1.0  --    ALKPHOS 113  --   --   --   --  41  --    ALT 24  --   --   --   --  17  --    AST 33  --   --   --   --  22   --    LIPASE 2,875*  --   --   --   --  94 128   TROPI  --   --   --   --  0.042 0.039  Canceled, Test credited 0.069*       Imaging:   No results found for this or any previous visit (from the past 24 hour(s)).

## 2019-03-25 NOTE — ANESTHESIA POSTPROCEDURE EVALUATION
Patient: Sanjeev Blackmon    Procedure(s):  ELI, CARDIOVERSION.    Diagnosis:SEPSIS.  Diagnosis Additional Information: No value filed.    Anesthesia Type:  General    Note:  Anesthesia Post Evaluation    Patient location during evaluation: Bedside  Patient participation: Able to fully participate in evaluation  Level of consciousness: awake and alert  Pain management: satisfactory to patient  Airway patency: patent  Cardiovascular status: hemodynamically stable  Respiratory status: acceptable and unassisted  Hydration status: balanced  PONV: none     Anesthetic complications: None          Last vitals:  Vitals:    03/25/19 1134 03/25/19 1136 03/25/19 1140   BP: 111/78 112/87 115/86   Pulse:  100 96   Resp:  17 24   Temp:      SpO2:            Electronically Signed By: Romeo Eid MD  March 25, 2019  11:51 AM

## 2019-03-25 NOTE — PROGRESS NOTES
ELI/DCCV Pt arrived to [facility] with amiodarone infusing into left hand, hand with slight swelling and redness around IV. Pt states it is tender. Amiodarone stopped and new IV placed on right AC. . Upper right antecubital site with previous infiltrate red with ice pack to . Denies pain.  Monitor shows questionable SVT rate 140's Stat EKG ordered. Dr. Farias states it is A-Flutter and will proceed. Pt received lasix prior to procedure and has voided x3 in urinal total of 800 ml. Pt received total of 2 mg IV versed and 50 mcg IV fentanyl for procedure. Tolerated well. DCCV per TOMMIE and Dr. Farias.  Converted to NSR rate 90's. Pt awakened and recovered, denies pain. Only c/o is thirst. Pts wife at bedside.Report to [facility] nurse, pt transferred to new room.

## 2019-03-25 NOTE — PROGRESS NOTES
A/O. Rhythm a fib with RVR. Amio gtt at 30ml/hr. Pt sent for a ELI/DCCV. Plan to transfer to [facility].

## 2019-03-25 NOTE — PROGRESS NOTES
"GASTROENTEROLOGY PROGRESS NOTE     SUBJECTIVE: Had ELI/Cardioversion today. Feeling better today. Abdominal distention much improved (\"20' pound baby to 5' baby\"). No nausea or vomiting. No pain now. White count increasing over the last couple of days 11-->15.7-->16.4. LFTs normal. Hungry. Low grade temp at 2000 yesterday.     OBJECTIVE:   BP (!) 132/91   Pulse 88   Temp 98.2  F (36.8  C) (Oral)   Resp 20   Ht 1.753 m (5' 9\")   Wt 101.8 kg (224 lb 6.9 oz)   SpO2 93%   BMI 33.14 kg/m     Temp (24hrs), Av.6  F (37  C), Min:98.2  F (36.8  C), Max:99.1  F (37.3  C)     Patient Vitals for the past 72 hrs:   Weight   19 0519 101.8 kg (224 lb 6.9 oz)   19 0525 104.1 kg (229 lb 8 oz)   19 0541 102.9 kg (226 lb 12.8 oz)        Intake/Output Summary (Last 24 hours) at 3/25/2019 1322  Last data filed at 3/25/2019 1000  Gross per 24 hour   Intake 90 ml   Output 1200 ml   Net -1110 ml      PHYSICAL EXAM   Constitutional: Sitting up in the chair, no acute distress  Cardiovascular: Regular rate and rhythm. No murmurs rubs or gallops  Respiratory: Clear to auscultation bilaterally  Abdomen: Soft, non-tender, non-distended, normally active bowel sounds. No masses or hepatosplenomegaly appreciated. No guarding or rebound tenderness.    Additional Comments:   ROS, FH, SH: See initial GI consult for details.     I have reviewed the patient's new clinical lab results:   Recent Labs   Lab Test 19  0724 19  1039 19  0550 19  0553  19  0143   WBC 16.4* 15.7* 11.4* 15.0*   < >  --    HGB 13.5  --  12.2* 11.6*   < >  --    MCV 87  --  90 90   < >  --      --  151 136*   < >  --    INR  --   --   --   --   --  1.39*    < > = values in this interval not displayed.      Recent Labs   Lab Test 19  0724 19  1039 19  0553    140 140   POTASSIUM 3.8 2.9* 3.6   CHLORIDE 110* 108 112*   CO2 23 24 23   BUN 19 21 29   CR 1.07 1.15 1.23   ANIONGAP 7 8 5   LEANNA 8.1* " 8.1* 8.0*      Recent Labs   Lab Test 03/25/19  0724 03/21/19  0628 03/21/19  0143 03/20/19  1543 03/20/19  0451 03/20/19  0325   ALBUMIN 1.8* 1.9*  --   --  2.2*  --    BILITOTAL 0.8 1.0  --   --  1.6*  --    ALT 24 17  --   --  20  --    AST 33 22  --   --  18  --    ALKPHOS 113 41  --   --  41  --    PROTEIN  --   --   --   --   --  100*   LIPASE  --  94 128 210 332  --           Assessment:  62 y/o male who presented with fevers, nausea / dry heaves, and epigastric pain, found to be tachycardic / hypotensive, with an elevated lactate and leukocytosis (20K).  CT 3/19 with inflammatory changes in the upper abdomen and retroperitoneum, abutting the gastric antrum and pancreas.  Lipase is normal, but total bilirubin 2.5.  US with GB wall thickening and sludge, but HIDA negative for acute cholecystitis.  EGD 3/19 with LA grade B esophagitis and small HH, but no etiology for his symptoms.  Repeat CT 3/20 unchanged. Lactate, and bilirubin have all improved. WBC initially improved, but increased again.   Presenting symptoms were consistent with pancreatitis (N/V, epigastric pain). His lipase is normal, but initial CT 3/19 did indicate possible pancreatitis. No report of heavy alcohol (drank ~2 alcoholic beverages / day on Friday and Saturday, with onset of symptoms on Monday).  If pancreatitis, may have been biliary etiology, with sludge seen, and elevated bili on presentation.  Some sort of infectious process also remains on the differential diagnosis.   Course has been complicated by atrial flutter with RVR.  Cardiology is following. Had ELI with cardioversion today.     Plan:    -- OK to advance to regular diet  -- Continue PPI   -- If white count is up tomorrow will consider CT scan per pancreatic protocol. If WBC trending down and he continues to feel well, will arrange outpatient EUS in 6 weeks.     This patient was discussed with Dr. Evans.    Aileen Garnett, [facility]  Cell: [phone]   Monday through Friday [id number]  Office: [phone]

## 2019-03-25 NOTE — PROGRESS NOTES
Cardioversion note:    The patient is a 63 year old with atrial flutter with RVR sent for a ELI/DCCV.    EGD this admission showed esophagitis in the distal esophagus so only a limited ELI was performed to avoid the distal esophagus.    Informed consent was obtained after explaining the risks and benefits with the patient who was in agreement.  His ELI did not show evidence for a LA appendage thrombus.    Pads were placed in the AP position and the patient was cardioverted with one 200 J shock.  No complications were noted.    Michael Farias MD

## 2019-03-26 ENCOUNTER — APPOINTMENT (OUTPATIENT)
Dept: ULTRASOUND IMAGING | Facility: CLINIC | Age: 64
DRG: 871 | End: 2019-03-26
Attending: INTERNAL MEDICINE
Payer: COMMERCIAL

## 2019-03-26 ENCOUNTER — APPOINTMENT (OUTPATIENT)
Dept: MRI IMAGING | Facility: CLINIC | Age: 64
DRG: 871 | End: 2019-03-26
Attending: NURSE PRACTITIONER
Payer: COMMERCIAL

## 2019-03-26 PROBLEM — I82.622 ARM DVT (DEEP VENOUS THROMBOEMBOLISM), ACUTE, LEFT (H): Status: ACTIVE | Noted: 2019-03-26

## 2019-03-26 LAB
ALBUMIN SERPL-MCNC: 1.9 G/DL (ref 3.4–5)
ALP SERPL-CCNC: 102 U/L (ref 40–150)
ALT SERPL W P-5'-P-CCNC: 24 U/L (ref 0–70)
AST SERPL W P-5'-P-CCNC: 30 U/L (ref 0–45)
BILIRUB DIRECT SERPL-MCNC: 0.2 MG/DL (ref 0–0.2)
BILIRUB SERPL-MCNC: 0.7 MG/DL (ref 0.2–1.3)
ERYTHROCYTE [DISTWIDTH] IN BLOOD BY AUTOMATED COUNT: 14.6 % (ref 10–15)
HCT VFR BLD AUTO: 40.8 % (ref 40–53)
HGB BLD-MCNC: 13.5 G/DL (ref 13.3–17.7)
LIPASE SERPL-CCNC: 2823 U/L (ref 73–393)
MCH RBC QN AUTO: 29.3 PG (ref 26.5–33)
MCHC RBC AUTO-ENTMCNC: 33.1 G/DL (ref 31.5–36.5)
MCV RBC AUTO: 89 FL (ref 78–100)
PLATELET # BLD AUTO: 346 10E9/L (ref 150–450)
POTASSIUM SERPL-SCNC: 3.9 MMOL/L (ref 3.4–5.3)
PROT SERPL-MCNC: 6.9 G/DL (ref 6.8–8.8)
RBC # BLD AUTO: 4.6 10E12/L (ref 4.4–5.9)
WBC # BLD AUTO: 15.6 10E9/L (ref 4–11)

## 2019-03-26 PROCEDURE — 21000001 ZZH R&B HEART CARE

## 2019-03-26 PROCEDURE — 25000132 ZZH RX MED GY IP 250 OP 250 PS 637: Performed by: INTERNAL MEDICINE

## 2019-03-26 PROCEDURE — 80076 HEPATIC FUNCTION PANEL: CPT | Performed by: INTERNAL MEDICINE

## 2019-03-26 PROCEDURE — 74183 MRI ABD W/O CNTR FLWD CNTR: CPT

## 2019-03-26 PROCEDURE — A9270 NON-COVERED ITEM OR SERVICE: HCPCS | Performed by: INTERNAL MEDICINE

## 2019-03-26 PROCEDURE — 83690 ASSAY OF LIPASE: CPT | Performed by: INTERNAL MEDICINE

## 2019-03-26 PROCEDURE — 99231 SBSQ HOSP IP/OBS SF/LOW 25: CPT | Performed by: INTERNAL MEDICINE

## 2019-03-26 PROCEDURE — 84132 ASSAY OF SERUM POTASSIUM: CPT | Performed by: INTERNAL MEDICINE

## 2019-03-26 PROCEDURE — 85027 COMPLETE CBC AUTOMATED: CPT | Performed by: INTERNAL MEDICINE

## 2019-03-26 PROCEDURE — 93971 EXTREMITY STUDY: CPT | Mod: LT

## 2019-03-26 PROCEDURE — A9585 GADOBUTROL INJECTION: HCPCS | Performed by: INTERNAL MEDICINE

## 2019-03-26 PROCEDURE — 25500064 ZZH RX 255 OP 636: Performed by: INTERNAL MEDICINE

## 2019-03-26 PROCEDURE — 99233 SBSQ HOSP IP/OBS HIGH 50: CPT | Performed by: INTERNAL MEDICINE

## 2019-03-26 PROCEDURE — 25800025 ZZH RX 258: Performed by: INTERNAL MEDICINE

## 2019-03-26 PROCEDURE — 36415 COLL VENOUS BLD VENIPUNCTURE: CPT | Performed by: INTERNAL MEDICINE

## 2019-03-26 RX ORDER — GADOBUTROL 604.72 MG/ML
10 INJECTION INTRAVENOUS ONCE
Status: COMPLETED | OUTPATIENT
Start: 2019-03-26 | End: 2019-03-26

## 2019-03-26 RX ORDER — ACYCLOVIR 200 MG/1
30 CAPSULE ORAL ONCE
Status: COMPLETED | OUTPATIENT
Start: 2019-03-26 | End: 2019-03-26

## 2019-03-26 RX ADMIN — FUROSEMIDE 40 MG: 40 TABLET ORAL at 11:02

## 2019-03-26 RX ADMIN — APIXABAN 10 MG: 5 TABLET, FILM COATED ORAL at 21:28

## 2019-03-26 RX ADMIN — APIXABAN 5 MG: 5 TABLET, FILM COATED ORAL at 09:17

## 2019-03-26 RX ADMIN — METOPROLOL TARTRATE 50 MG: 50 TABLET ORAL at 21:28

## 2019-03-26 RX ADMIN — CEFUROXIME AXETIL 500 MG: 500 TABLET ORAL at 09:17

## 2019-03-26 RX ADMIN — GADOBUTROL 10 ML: 604.72 INJECTION INTRAVENOUS at 15:18

## 2019-03-26 RX ADMIN — PANTOPRAZOLE SODIUM 40 MG: 40 TABLET, DELAYED RELEASE ORAL at 06:24

## 2019-03-26 RX ADMIN — SODIUM CHLORIDE 30 ML: 9 INJECTION, SOLUTION INTRAMUSCULAR; INTRAVENOUS; SUBCUTANEOUS at 15:18

## 2019-03-26 RX ADMIN — METOPROLOL TARTRATE 50 MG: 50 TABLET ORAL at 09:17

## 2019-03-26 ASSESSMENT — ACTIVITIES OF DAILY LIVING (ADL)
ADLS_ACUITY_SCORE: 11

## 2019-03-26 ASSESSMENT — MIFFLIN-ST. JEOR: SCORE: 1778.74

## 2019-03-26 NOTE — PROGRESS NOTES
[facility]    Hospitalist Progress Note    Assessment & Plan   Sanjeev Blackmon is a 63 year-old male admitted on 3/19/2019 after presenting with 1 day of dry heaves with epigastrium pain, and fevers to 102F, clinical picture consistent with sepsis most likely due to intra-abdominal pathology, although no definite source identified, and probable pneumonia, treated with antibiotics:      Impression:   Principal Problem:    Sepsis -- ?source. Better but not resolved   -- improving   -- sludge in gall bladder appears incidental (Neg HIDA scan)   -- WBC still elevated, Lipase elevated   -- Will discuss with [facility] (?Image abdomin)      Atrial flutter with rapid ventricular response    -- Metoprolol 50 mg bid   -- ELI and successful cardioversion 3/25/19   -- Eliquid 5 mg bid (post cardioversion)   -- [id number] Score 1 (1.3% annual stroke risk --low)      Swollen Left arm, S/P extravasation in hand and elbow   -- will get left arm ultrasound today --- Left arm DVT    -- increase Eliquis to 10 mg bid      Rash on back -- ?allergy to Ceftin   -- stop Ceftin, monitor WBC and temp      Gastroesophageal reflux disease with esophagitis   -- may have been contributing to epigastric abdominal pain   -- improved with PPI      BRANDEN (acute kidney injury) -- resolved    Pneumonia LLL   -- PO Ceftin      Plan:  Discussed with GI and will obtain MRCP.  Updated wife.     DVT Prophylaxis: Pneumatic Compression Devices  Code Status: Full Code    Disposition: Expected discharge potentially tomorrow    Samson Jacobs MD  Pager [phone]  Cell Phone [phone]  Text Page (7am to 6pm)    Interval History   Feels OK, no pain with eating but abdomin still distended and left had and arm swollen (had IV infiltrate in left hand and antecubital fossa 2 days ago).  Has not been able to sleep.  Also peuritic rash on back.     Physical Exam   Temp: 98.8  F (37.1  C) Temp src: Oral BP: (!) 151/103 Pulse: 89 Heart Rate:  90 Resp: 16 SpO2: 95 % O2 Device: None (Room air) Oxygen Delivery: 3 LPM  Vitals:    03/24/19 0525 03/25/19 0519 03/26/19 0054   Weight: 104.1 kg (229 lb 8 oz) 101.8 kg (224 lb 6.9 oz) 100.3 kg (221 lb 3.2 oz)     Vital Signs with Ranges  Temp:  [98  F (36.7  C)-100  F (37.8  C)] 98.8  F (37.1  C)  Pulse:  [] 89  Heart Rate:  [90] 90  Resp:  [16-30] 16  BP: (108-155)/() 151/103  SpO2:  [91 %-97 %] 95 %  I/O last 3 completed shifts:  In: 90 [I.V.:90]  Out: 750 [Urine:750]    # Pain Assessment:  Current Pain Score 3/26/2019   Patient currently in pain? denies   Pain score (0-10) -   Sanjeev villalobos pain level was assessed and he currently denies pain.        Constitutional: Awake, alert, cooperative, no apparent distress  Respiratory: Clear to auscultation bilaterally, no crackles or wheezing  Cardiovascular: Regular rate and rhythm, normal S1 and S2, and no murmur noted  GI: mildly distended, non-tender to palpation, bowel sounds present but decreased   Extrem: No calf tenderness, trace ankle edema. Left hand and elbow area puffy and mildly tender, redness improved, but hand to swollen to make a fist.   Neuro: Ox3, no focal motor or sensory deficits  Skin:  Maculopapular rash on back    Medications     amiodarone 0.5 mg/min (03/25/19 0700)     - MEDICATION INSTRUCTIONS -         apixaban ANTICOAGULANT  5 mg Oral BID     cefuroxime  500 mg Oral Q12H GARCIA     furosemide  40 mg Oral Daily     metoprolol tartrate  50 mg Oral BID     pantoprazole  40 mg Oral QAM AC     sodium chloride (PF)  3 mL Intracatheter Q8H       Data   Recent Labs   Lab 03/26/19  0533 03/25/19  0724 03/24/19  1039 03/23/19  0550 03/22/19  0553 03/21/19  0947 03/21/19  0628 03/21/19  0143   WBC 15.6* 16.4* 15.7* 11.4* 15.0*  --  14.6*  --    HGB 13.5 13.5  --  12.2* 11.6*  --  11.8*  --    MCV 89 87  --  90 90  --  91  --     282  --  151 136*  --  127*  --    INR  --   --   --   --   --   --   --  1.39*   NA  --  140 140  --  140  --  142  142   POTASSIUM 3.9 3.8 2.9*  --  3.6  --  3.7 4.1   CHLORIDE  --  110* 108  --  112*  --  114* 111*   CO2  --  23 24  --  23  --  20 24   BUN  --  19 21  --  29  --  32* 35*   CR  --  1.07 1.15  --  1.23  --  1.28* 1.50*   ANIONGAP  --  7 8  --  5  --  8 7   LEANNA  --  8.1* 8.1*  --  8.0*  --  7.9* 8.1*   GLC  --  95 94  --  92  --  108* 78   ALBUMIN 1.9* 1.8*  --   --   --   --  1.9*  --    PROTTOTAL 6.9 6.7*  --   --   --   --  6.1*  --    BILITOTAL 0.7 0.8  --   --   --   --  1.0  --    ALKPHOS 102 113  --   --   --   --  41  --    ALT 24 24  --   --   --   --  17  --    AST 30 33  --   --   --   --  22  --    LIPASE 2,823* 2,875*  --   --   --   --  94 128   TROPI  --   --   --   --   --  0.042 0.039  Canceled, Test credited 0.069*       Imaging:   No results found for this or any previous visit (from the past 24 hour(s)).

## 2019-03-26 NOTE — PROGRESS NOTES
"GASTROENTEROLOGY PROGRESS NOTE     SUBJECTIVE:   Abdominal distention much improved over the last 48 hours, but still present. No nausea or vomiting. No pain now. White count increasing over the last couple of days 11-->15.7-->16.4-->15.6. Lipase 2,800 today. LFTs normal. Hungry. Hopeful to discharge soon.   Had ELI/Cardioversion yesterday.   Wife noted on back yesterday. No itching.     OBJECTIVE:   /86 (BP Location: Left arm)   Pulse 94   Temp 98.8  F (37.1  C) (Oral)   Resp 16   Ht 1.753 m (5' 9\")   Wt 100.3 kg (221 lb 3.2 oz)   SpO2 98%   BMI 32.67 kg/m     Temp (24hrs), Av.6  F (37  C), Min:98.2  F (36.8  C), Max:99.1  F (37.3  C)     Patient Vitals for the past 72 hrs:   Weight   19 0054 100.3 kg (221 lb 3.2 oz)   19 0519 101.8 kg (224 lb 6.9 oz)   19 0525 104.1 kg (229 lb 8 oz)        Intake/Output Summary (Last 24 hours) at 3/25/2019 1322  Last data filed at 3/25/2019 1000  Gross per 24 hour   Intake 90 ml   Output 1200 ml   Net -1110 ml      PHYSICAL EXAM   Constitutional: Sitting up in the chair, no acute distress  Cardiovascular: Regular rate and rhythm. No murmurs rubs or gallops  Respiratory: Clear to auscultation bilaterally  Abdomen: Soft, +mildly distended, normally active bowel sounds. No guarding or rebound tenderness.  Skin: mild papular, erythematous rash noted on back, not present on arms, chest, or face     Additional Comments:   ROS, FH, SH: See initial GI consult for details.     I have reviewed the patient's new clinical lab results:   Recent Labs   Lab Test 19  0533 19  0724 19  1039 19  0550  19  0143   WBC 15.6* 16.4* 15.7* 11.4*   < >  --    HGB 13.5 13.5  --  12.2*   < >  --    MCV 89 87  --  90   < >  --     282  --  151   < >  --    INR  --   --   --   --   --  1.39*    < > = values in this interval not displayed.      Recent Labs   Lab Test 19  0533 19  0724 19  1039 19  0553   NA  --  140 " 140 140   POTASSIUM 3.9 3.8 2.9* 3.6   CHLORIDE  --  110* 108 112*   CO2  --  23 24 23   BUN  --  19 21 29   CR  --  1.07 1.15 1.23   ANIONGAP  --  7 8 5   LEANNA  --  8.1* 8.1* 8.0*      Recent Labs   Lab Test 03/26/19  0533 03/25/19  0724 03/21/19  0628  03/20/19  0325   ALBUMIN 1.9* 1.8* 1.9*   < >  --    BILITOTAL 0.7 0.8 1.0   < >  --    ALT 24 24 17   < >  --    AST 30 33 22   < >  --    ALKPHOS 102 113 41   < >  --    PROTEIN  --   --   --   --  100*   LIPASE 2,823* 2,875* 94   < >  --     < > = values in this interval not displayed.          Assessment:  62 y/o male who presented with fevers, nausea/dry heaves, and epigastric pain, found to be tachycardi /hypotensive, with an elevated lactate and leukocytosis (20K).  CT 3/19 with inflammatory changes in the upper abdomen and retroperitoneum, abutting the gastric antrum and pancreas.  Lipase was normal, but increased to 2800 on 3/25. US with GB wall thickening and sludge, but HIDA negative for acute cholecystitis.    EGD 3/19 with LA grade B esophagitis and small HH, but no etiology for his symptoms.   Repeat CT 3/20 unchanged. Lactate, and bilirubin all improved. WBC initially improved, but increased again.   Presenting symptoms were consistent with pancreatitis (N/V, epigastric pain). His lipase was normal upon admission, but was 2800 on 3/25. Initial CT 3/19 did indicate possible pancreatitis. No report of heavy alcohol (drank ~2 alcoholic beverages / day on Friday and Saturday, with onset of symptoms on Monday). Possible biliary etiology, with sludge seen, and elevated bili on presentation.  Some sort of infectious process also remains on the differential diagnosis.   Course has been complicated by atrial flutter with RVR. Had ELI with cardioversion 3/25.     Plan:    -- Low fat diet   -- Continue PPI   -- Discussed with Dr. Lindsey and Dr. Evans [facility] this afternoon for further evaluation   -- Will arrange outpatient EUS in 6 weeks    Aileen Garnett, CNP    [facility]   Cell: [phone]  Monday through Friday [id number]  Office: [phone]

## 2019-03-26 NOTE — PROGRESS NOTES
12 hour RN.  Transfers independently.  A/Ox4.  VSS.  Denies pain.  Tele SR.  LS dim.  Denies SOB.  2+ edema to johanna LE.  Edema to left forearm where pt had 2 IV sites  that extravasation.  GI following.  Pt continues on po Protonix.  Pt continues on Ceftin for PNA.  Pt voiding w/o difficulty.

## 2019-03-26 NOTE — PLAN OF CARE
Pt has been pain free with V/signs stable and maintaining his O2 sats in the 90's on Rm air. Temp at 13:30 99.8 and Rash present per back so Dr Lindsey did stop po antibiotic.. U/S per Lt arm was completed and clot was present per upper old arm. Eliquis was increase to 10 mg per BID with first starting this Evening. Lt Arm is warm, arthur and tender to touch especially per hand. Ice pack was placed and Lt arm is elevated on pillow. Lipase was 2.823 this morning ( which is decreased) and will be rechecked in Am. No ABD pain today and did have BM. Pt left unit for MRI of ABD and has been NPO since breakfast. Remains in NSR .Wife at bedside  and has been updated through he day.

## 2019-03-26 NOTE — CONSULTS
Medication coverage check for Eliquis. Patient has a $200 deductible to meet.  1st fill: free with voucher from discharge pharmacy  2nd fill: $240 in order to meet deductible.  After that: $40 monthly.    Belinda Lin CphT  [facility] Discharge Pharmacy Liaison  Liaison Cell: [phone]

## 2019-03-26 NOTE — PROGRESS NOTES
SPIRITUAL HEALTH SERVICES Progress Note  [facility]    Initial visit per LOS.  Pt pleasantly declined offer of a  visit.  Pt expressed hope that he might DSC tomorrow, and adds that a best case scenario is for him to then travel to [place] to join his family on vacation there.  wished pt well.                                                                                                                                           Sanjeev Tan M.Div., [facility]  Staff   Pager [phone]

## 2019-03-26 NOTE — PROGRESS NOTES
[facility]    Hospitalist Progress Note    Assessment & Plan   Sanjeev Blackmon is a 63 year-old male admitted on 3/19/2019 after presenting with 1 day of dry heaves with epigastrium pain, and fevers to 102F, clinical picture consistent with sepsis most likely due to intra-abdominal pathology, although no definite source identified, and probable pneumonia, treated with antibiotics:      Impression:   Principal Problem:    Sepsis -- ?source. Better but not resolved   -- improving   -- sludge in gall bladder appears incidental (Neg HIDA scan)   -- WBC still elevated, Lipase elevated   -- Will discuss with [facility] (?Image abdomin)      Atrial flutter with rapid ventricular response    -- Metoprolol 50 mg bid   -- ELI and successful cardioversion 3/25/19   -- Eliquid 5 mg bid (post cardioversion)   -- [id number] Score 1 (1.3% annual stroke risk --low)      Swollen Left arm w Cephalic Vein Thrombus   -- S/P extravasation in hand and elbow   -- left arm ultrasound today shows thrombus   -- on Eliquis as above, will increase to 10 mg bid while here      Gastroesophageal reflux disease with esophagitis   -- may have been contributing to epigastric abdominal pain   -- improved with PPI      BRANDEN (acute kidney injury) -- resolved    Pneumonia LLL   -- PO Ceftin      Plan:  Will discuss with GI and consider abdominal imaging.     DVT Prophylaxis: Pneumatic Compression Devices  Code Status: Full Code    Disposition: Expected discharge potentially tomorrow    Samson Jacobs MD  Pager [phone]  Cell Phone [phone]  Text Page (7am to 6pm)    Interval History   Feels OK, no pain with eating but abdomin still distended and left had and arm swollen (had IV infiltrate in left hand and antecubital fossa 2 days ago).  Has not been able to sleep.        Physical Exam   Temp: 98.8  F (37.1  C) Temp src: Oral BP: 128/86 Pulse: 84 Heart Rate: 96 Resp: 16 SpO2: 98 % O2 Device: None (Room air)    Vitals:     03/24/19 0525 03/25/19 0519 03/26/19 0054   Weight: 104.1 kg (229 lb 8 oz) 101.8 kg (224 lb 6.9 oz) 100.3 kg (221 lb 3.2 oz)     Vital Signs with Ranges  Temp:  [98  F (36.7  C)-99.1  F (37.3  C)] 98.8  F (37.1  C)  Pulse:  [84-96] 84  Heart Rate:  [90-96] 96  Resp:  [16-18] 16  BP: (128-146)/(85-99) 128/86  SpO2:  [91 %-98 %] 98 %  I/O last 3 completed shifts:  In: 90 [I.V.:90]  Out: 750 [Urine:750]    # Pain Assessment:  Current Pain Score 3/26/2019   Patient currently in pain? denies   Pain score (0-10) -   Sanjeev villalobos pain level was assessed and he currently denies pain.        Constitutional: Awake, alert, cooperative, no apparent distress  Respiratory: Clear to auscultation bilaterally, no crackles or wheezing  Cardiovascular: Regular rate and rhythm, normal S1 and S2, and no murmur noted  GI: mildly distended, non-tender to palpation, bowel sounds present but decreased   Extrem: No calf tenderness, trace ankle edema. Left hand and elbow area puffy and mildly tender, redness improved, but hand to swollen to make a fist.   Neuro: Ox3, no focal motor or sensory deficits    Medications     - MEDICATION INSTRUCTIONS -         apixaban ANTICOAGULANT  5 mg Oral BID     cefuroxime  500 mg Oral Q12H GARCIA     furosemide  40 mg Oral Daily     metoprolol tartrate  50 mg Oral BID     pantoprazole  40 mg Oral QAM AC     sodium chloride (PF)  3 mL Intracatheter Q8H       Data   Recent Labs   Lab 03/26/19  0533 03/25/19  0724 03/24/19  1039 03/23/19  0550 03/22/19  0553 03/21/19  0947 03/21/19  0628 03/21/19  0143   WBC 15.6* 16.4* 15.7* 11.4* 15.0*  --  14.6*  --    HGB 13.5 13.5  --  12.2* 11.6*  --  11.8*  --    MCV 89 87  --  90 90  --  91  --     282  --  151 136*  --  127*  --    INR  --   --   --   --   --   --   --  1.39*   NA  --  140 140  --  140  --  142 142   POTASSIUM 3.9 3.8 2.9*  --  3.6  --  3.7 4.1   CHLORIDE  --  110* 108  --  112*  --  114* 111*   CO2  --  23 24  --  23  --  20 24   BUN  --  19 21  --   29  --  32* 35*   CR  --  1.07 1.15  --  1.23  --  1.28* 1.50*   ANIONGAP  --  7 8  --  5  --  8 7   LEANNA  --  8.1* 8.1*  --  8.0*  --  7.9* 8.1*   GLC  --  95 94  --  92  --  108* 78   ALBUMIN 1.9* 1.8*  --   --   --   --  1.9*  --    PROTTOTAL 6.9 6.7*  --   --   --   --  6.1*  --    BILITOTAL 0.7 0.8  --   --   --   --  1.0  --    ALKPHOS 102 113  --   --   --   --  41  --    ALT 24 24  --   --   --   --  17  --    AST 30 33  --   --   --   --  22  --    LIPASE 2,823* 2,875*  --   --   --   --  94 128   TROPI  --   --   --   --   --  0.042 0.039  Canceled, Test credited 0.069*       Imaging:   Recent Results (from the past 24 hour(s))   US Upper Extremity Venous Duplex Left    Narrative    PROCEDURE:  Venous Doppler ultrasound of the left upper extremity    DATE OF PROCEDURE:  3/26/2019 10:22 AM    CLINICAL HISTORY/INDICATION:   rule out DVT, left arm swollen    COMPARISON:   None relevant    TECHNIQUE:   Grayscale, color-flow, and spectral waveform analysis were performed  of the deep veins of the left upper extremity    FINDINGS:   The left jugular vein demonstrates normal compressibility, color-flow  and spectral waveform.    The left subclavian vein, axillary vein, brachial vein and basilic  vein demonstrate normal compressibility, spectral waveform, color flow  and augmentation.    Occlusive thrombus is present within the cephalic vein extending from  the antecubital fossa proximally to the mid humerus. Additionally,  thrombus is present in the peripheral cephalic vein near the wrist  extending into a branch of the cephalic vein over the dorsum of the  hand.      Impression    IMPRESSION:   1. Occlusive thrombus within the cephalic vein extending from the mid  humerus to the antecubital fossa as well as in the most peripheral  portions overlying the wrist and extending into a side branch over the  dorsum of the hand.    ALYSON ZENDEJAS MD

## 2019-03-26 NOTE — PROGRESS NOTES
"CLINICAL NUTRITION SERVICES  -  ASSESSMENT NOTE      Recommendations Ordered by Registered Dietitian (RD): Boost Breeze TID with meals    Malnutrition:   % Weight Loss:  None noted  % Intake:  </= 50% for >/= 5 days (severe malnutrition)  Subcutaneous Fat Loss:  None observed  Muscle Loss:  None observed  Fluid Retention:  None noted    Malnutrition Diagnosis: Patient does not meet two of the above criteria necessary for diagnosing malnutrition        REASON FOR ASSESSMENT  Sanjeev Blackmon is a 63 year old male seen by Registered Dietitian for LOS      NUTRITION HISTORY  - Information obtained from patient and wife (Katrina).  They state that at baseline he has a good appetite and intake, denies any nutritional issues.  He was eating well until the day prior to admit (starting having nausea and vomiting + abdominal pain after eating a [employer] bagel).  Patient has not used supplements in the past as he has not had a need for them.       CURRENT NUTRITION ORDERS  Diet Order:     Low fat      Current Intake/Tolerance:  Patient reports that he had toast, apple juice, and tea this morning.  Last night he had toast and green beans.  He has not ordered lunch yet as he is waiting for an MRI.  He plans to order a tuna fish sandwich when he is able.     Overall, limited intake over the last week due to ongoing GI workup and diet limitations.       NUTRITION FOCUSED PHYSICAL ASSESSMENT (NFPA)  Completed:       Yes - Visual Assessment only       Observed      No nutrition-related physical findings  Obtained from Chart/Interdisciplinary Team      No nutrition-related physical findings noted    ANTHROPOMETRICS  Height: 5' 9\"  Weight: 100.3 kg (221#)(3/26)  Admit Weight:  93.2 kg (206#)  Body mass index is 32.67 kg/m   Weight Status:  Obesity Grade I BMI 30-34.9  IBW: 72.7 kg   % IBW: 128%  Weight History: Patient reports that his weight is usually around 200# --> has increased since admit due to fluids (now diuresing on " Lasix)    LABS  Lipase 2823 (H) - Possible pancreatitis     MEDICATIONS  Lasix for fluid overload       ASSESSED NUTRITION NEEDS PER APPROVED PRACTICE GUIDELINES:    Dosing Weight 78 kg (adjusted)  Estimated Energy Needs: 0907-9808 kcals (25-30 Kcal/Kg)  Justification: obese  Estimated Protein Needs:  grams protein (1.2-1.5 g pro/Kg)  Justification: hypercatabolism with acute illness  Estimated Fluid Needs: 5393-5662 mL (1 mL/Kcal)  Justification: maintenance    MALNUTRITION:  % Weight Loss:  None noted  % Intake:  </= 50% for >/= 5 days (severe malnutrition)  Subcutaneous Fat Loss:  None observed  Muscle Loss:  None observed  Fluid Retention:  None noted    Malnutrition Diagnosis: Patient does not meet two of the above criteria necessary for diagnosing malnutrition    NUTRITION DIAGNOSIS:  Inadequate oral intake related to ongoing abdominal pain, GI workup as evidenced by minimal intake since admit       NUTRITION INTERVENTIONS  Recommendations / Nutrition Prescription  Continue low fat diet as tolerated  Boost Breeze TID with meals     Implementation  Nutrition education: Provided education on low fat diet for home.  Wife given material to review at later time.   Medical Food Supplement:  Boost Breeze TID with meals entered into Epic.     Nutrition Goals  Patient will consume 75% of meals and supplements TID within the next 48-72 hours     MONITORING AND EVALUATION:  Progress towards goals will be monitored and evaluated per protocol and Practice Guidelines    Laura Montero RD, LD, CNSC   Clinical Dietitian - [facility]

## 2019-03-26 NOTE — PROGRESS NOTES
"Cardiology Progress Note   Date of service: 19       Assessment and Plan:     1. Paroxysmal atrial flutter    Amiodarone loaded and successfully DC cardioverted    Discharge patient on Eliquis 5 mg twice per day, 30-day event monitor and follow-up in cardiology clinic in 2-4 weeks    Please call with questions                 Interval History:   Patient breathing better after cardioversion.  Left arm IV infiltrated and patient is going down for ultrasound              Review of Systems:   As per subjective, otherwise 5 systems reviewed and negative.           Physical Exam:     Vitals were reviewed  Blood pressure 128/86, pulse 94, temperature 98.8  F (37.1  C), temperature source Oral, resp. rate 16, height 1.753 m (5' 9\"), weight 100.3 kg (221 lb 3.2 oz), SpO2 98 %.  Temperatures:  Current - Temp: 98.8  F (37.1  C); Max - Temp  Av.9  F (37.2  C)  Min: 98  F (36.7  C)  Max: 100  F (37.8  C)  Respiration range: Resp  Av.1  Min: 16  Max: 30  Pulse range: Pulse  Av.4  Min: 86  Max: 147  Blood pressure range: Systolic (24hrs), Av , Min:108 , Max:155   ; Diastolic (24hrs), Av, Min:75, Max:119    Pulse oximetry range: SpO2  Av.9 %  Min: 91 %  Max: 98 %    Intake/Output Summary (Last 24 hours) at 3/26/2019 0956  Last data filed at 3/26/2019 0700  Gross per 24 hour   Intake 450 ml   Output 550 ml   Net -100 ml       Constitutional:   awake, alert, cooperative, no apparent distress, and appears stated age     Eyes:   Lids and lashes normal, pupils equal, round and reactive to light, extra ocular muscles intact, sclera clear, conjunctiva normal     Neck:   supple, symmetrical, trachea midline     Back:   symmetric       Cardiovascular:   Regular     Abdomen:   benign     Musculoskeletal:   motor strength is 5 out of 5 all extremities bilaterally     Neurologic:   Grossly nonfocal     Skin:   Left hand edema            Medications:     Current Facility-Administered Medications Ordered in " Epic   Medication Dose Route Frequency Last Rate Last Dose     acetaminophen (TYLENOL) Suppository 650 mg  650 mg Rectal Q4H PRN         acetaminophen (TYLENOL) tablet 650 mg  650 mg Oral Q4H PRN   650 mg at 03/25/19 0047     apixaban ANTICOAGULANT (ELIQUIS) tablet 5 mg  5 mg Oral BID   5 mg at 03/26/19 0917     bisacodyl (DULCOLAX) EC tablet 5 mg  5 mg Oral Daily PRN        Or     bisacodyl (DULCOLAX) EC tablet 10 mg  10 mg Oral Daily PRN        Or     bisacodyl (DULCOLAX) EC tablet 15 mg  15 mg Oral Daily PRN         cefuroxime (CEFTIN) tablet 500 mg  500 mg Oral Q12H GARCIA   500 mg at 03/26/19 0917     furosemide (LASIX) tablet 40 mg  40 mg Oral Daily   40 mg at 03/26/19 0917     hydrALAZINE (APRESOLINE) tablet 25 mg  25 mg Oral TID PRN         hydrOXYzine (ATARAX) tablet 25 mg  25 mg Oral Q6H PRN   25 mg at 03/24/19 1312    Or     hydrOXYzine (ATARAX) tablet 50 mg  50 mg Oral Q6H PRN         lidocaine (LMX4) cream   Topical Q1H PRN         lidocaine 1 % 0.1-1 mL  0.1-1 mL Other Q1H PRN         May continue current IV fluids if patient has IV fluids infusing.   Does not apply Continuous PRN         melatonin tablet 1 mg  1 mg Oral At Bedtime PRN   1 mg at 03/21/19 2130     metoclopramide (REGLAN) tablet 5 mg  5 mg Oral Q6H PRN        Or     metoclopramide (REGLAN) injection 5 mg  5 mg Intravenous Q6H PRN         metoprolol tartrate (LOPRESSOR) tablet 50 mg  50 mg Oral BID   50 mg at 03/26/19 0917     naloxone (NARCAN) injection 0.1-0.4 mg  0.1-0.4 mg Intravenous Q2 Min PRN         nitroGLYcerin (NITROSTAT) sublingual tablet 0.4 mg  0.4 mg Sublingual Q5 Min PRN         ondansetron (ZOFRAN-ODT) ODT tab 4 mg  4 mg Oral Q6H PRN        Or     ondansetron (ZOFRAN) injection 4 mg  4 mg Intravenous Q6H PRN         oxyCODONE (ROXICODONE) tablet 5-10 mg  5-10 mg Oral Q3H PRN   5 mg at 03/22/19 1948     pantoprazole (PROTONIX) EC tablet 40 mg  40 mg Oral QAM AC   40 mg at 03/26/19 0624     polyethylene glycol  (MIRALAX/GLYCOLAX) Packet 17 g  17 g Oral Daily PRN         prochlorperazine (COMPAZINE) injection 5 mg  5 mg Intravenous Q6H PRN        Or     prochlorperazine (COMPAZINE) tablet 5 mg  5 mg Oral Q6H PRN        Or     prochlorperazine (COMPAZINE) Suppository 12.5 mg  12.5 mg Rectal Q12H PRN         sodium chloride (PF) 0.9% PF flush 3 mL  3 mL Intravenous q1 min prn         sodium chloride (PF) 0.9% PF flush 3 mL  3 mL Intracatheter q1 min prn         sodium chloride (PF) 0.9% PF flush 3 mL  3 mL Intracatheter Q8H   3 mL at 19 2040     No current Epic-ordered outpatient medications on file.                Data:   All laboratory data reviewed  Results for orders placed or performed during the hospital encounter of 19 (from the past 24 hour(s))   EKG 12-lead, tracing only   Result Value Ref Range    Interpretation ECG Click View Image link to view waveform and result    EKG 12-lead, tracing only   Result Value Ref Range    Interpretation ECG Click View Image link to view waveform and result    Transesophageal Echocardiogram    Narrative    [id number]  [id number]  [id number]  [id number]^KAREY^LUCILA^CIARRA           [facility]  Echocardiography Laboratory  [address] [place] [address]        Name: TIM ROCK  MRN: [id number]  : 1955  Study Date: 2019 09:58 AM  Age: 63 yrs  Gender: Male  Patient Location: [facility]  Reason For Study: Afib  Ordering Physician: LUCILA EDEN  Referring Physician: DAVID MANZANO  Performed By: Doug Nguyen RDCS     BSA: 2.2 m2  Height: 69 in  Weight: 230 lb  HR: 141  BP: 141/103 mmHg  _____________________________________________________________________________  __        Procedure  Limited ELI Adult. ELI Probe serial #[id number] was used during the procedure.  _____________________________________________________________________________  __        Interpretation Summary     The visual ejection fraction is estimated at 40-45%.  No thrombus is  detected in the left atrial appendage.  _____________________________________________________________________________  __        ELI  I determined this patient to be an appropriate candidate for the planned  sedation and procedure and have reassessed the patient immediately prior to  sedation and procedure. Total sedation time: 11 minutes of continuous bedside  1:1 monitoring. Versed (2mg) was given intravenously. Fentanyl (50mcg) was  given intravenously. Consent to the procedure was obtained prior to sedation.  Prior to the exam, the oral cavity was checked and no overcrowding was noted.  The transesophageal probe was passed without difficulty. There were no  complications associated with this procedure.     Left Ventricle  The visual ejection fraction is estimated at 40-45%. There is mild global  hypokinesia of the left ventricle.     Right Ventricle  The right ventricle is normal in size and function.     Atria  No thrombus is detected in the left atrial appendage.        Mitral Valve  There is mild (1+) mitral regurgitation.     Tricuspid Valve  The tricuspid valve is not well visualized, but is grossly normal. There is  mild (1+) tricuspid regurgitation.     Aortic Valve  The aortic valve is trileaflet. No hemodynamically significant valvular aortic  stenosis.     Rhythm  Atrial flutter.     _____________________________________________________________________________  __                             Report approved by: Michael Flores 03/25/2019 01:13 PM                    _____________________________________________________________________________  __      Lipase   Result Value Ref Range    Lipase 2,823 (H) 73 - 393 U/L   CBC with platelets   Result Value Ref Range    WBC 15.6 (H) 4.0 - 11.0 10e9/L    RBC Count 4.60 4.4 - 5.9 10e12/L    Hemoglobin 13.5 13.3 - 17.7 g/dL    Hematocrit 40.8 40.0 - 53.0 %    MCV 89 78 - 100 fl    MCH 29.3 26.5 - 33.0 pg    MCHC 33.1 31.5 - 36.5 g/dL    RDW 14.6 10.0 - 15.0 %     Platelet Count 346 150 - 450 10e9/L   Potassium   Result Value Ref Range    Potassium 3.9 3.4 - 5.3 mmol/L   Hepatic panel   Result Value Ref Range    Bilirubin Direct 0.2 0.0 - 0.2 mg/dL    Bilirubin Total 0.7 0.2 - 1.3 mg/dL    Albumin 1.9 (L) 3.4 - 5.0 g/dL    Protein Total 6.9 6.8 - 8.8 g/dL    Alkaline Phosphatase 102 40 - 150 U/L    ALT 24 0 - 70 U/L    AST 30 0 - 45 U/L       All laboratory data reviewed  Lab Results   Component Value Date    CHOL 233 02/27/2015     Lab Results   Component Value Date    HDL 78 02/27/2015     Lab Results   Component Value Date     02/27/2015     Lab Results   Component Value Date    TRIG 66 02/27/2015     No results found for: CHOLHDLRATIO  No results found for: TSH  Last Basic Metabolic Panel:  Lab Results   Component Value Date     03/25/2019      Lab Results   Component Value Date    POTASSIUM 3.9 03/26/2019     Lab Results   Component Value Date    CHLORIDE 110 03/25/2019     Lab Results   Component Value Date    LEANNA 8.1 03/25/2019     Lab Results   Component Value Date    CO2 23 03/25/2019     Lab Results   Component Value Date    BUN 19 03/25/2019     Lab Results   Component Value Date    CR 1.07 03/25/2019     Lab Results   Component Value Date    GLC 95 03/25/2019     Lab Results   Component Value Date    WBC 15.6 03/26/2019     Lab Results   Component Value Date    RBC 4.60 03/26/2019     Lab Results   Component Value Date    HGB 13.5 03/26/2019     Lab Results   Component Value Date    HCT 40.8 03/26/2019     Lab Results   Component Value Date    MCV 89 03/26/2019     Lab Results   Component Value Date    MCH 29.3 03/26/2019     Lab Results   Component Value Date    MCHC 33.1 03/26/2019     Lab Results   Component Value Date    RDW 14.6 03/26/2019     Lab Results   Component Value Date     03/26/2019

## 2019-03-27 LAB
BASOPHILS # BLD AUTO: 0 10E9/L (ref 0–0.2)
BASOPHILS NFR BLD AUTO: 0.3 %
DIFFERENTIAL METHOD BLD: ABNORMAL
EOSINOPHIL # BLD AUTO: 0.1 10E9/L (ref 0–0.7)
EOSINOPHIL NFR BLD AUTO: 1.1 %
ERYTHROCYTE [DISTWIDTH] IN BLOOD BY AUTOMATED COUNT: 14.6 % (ref 10–15)
HCT VFR BLD AUTO: 37.5 % (ref 40–53)
HGB BLD-MCNC: 12.4 G/DL (ref 13.3–17.7)
IMM GRANULOCYTES # BLD: 0.3 10E9/L (ref 0–0.4)
IMM GRANULOCYTES NFR BLD: 2 %
INTERPRETATION ECG - MUSE: NORMAL
LIPASE SERPL-CCNC: 3846 U/L (ref 73–393)
LYMPHOCYTES # BLD AUTO: 1.7 10E9/L (ref 0.8–5.3)
LYMPHOCYTES NFR BLD AUTO: 13.5 %
MCH RBC QN AUTO: 29.2 PG (ref 26.5–33)
MCHC RBC AUTO-ENTMCNC: 33.1 G/DL (ref 31.5–36.5)
MCV RBC AUTO: 88 FL (ref 78–100)
MONOCYTES # BLD AUTO: 0.8 10E9/L (ref 0–1.3)
MONOCYTES NFR BLD AUTO: 6.2 %
NEUTROPHILS # BLD AUTO: 9.8 10E9/L (ref 1.6–8.3)
NEUTROPHILS NFR BLD AUTO: 76.9 %
NRBC # BLD AUTO: 0 10*3/UL
NRBC BLD AUTO-RTO: 0 /100
PLATELET # BLD AUTO: 325 10E9/L (ref 150–450)
RBC # BLD AUTO: 4.24 10E12/L (ref 4.4–5.9)
WBC # BLD AUTO: 12.7 10E9/L (ref 4–11)

## 2019-03-27 PROCEDURE — 25000132 ZZH RX MED GY IP 250 OP 250 PS 637: Performed by: INTERNAL MEDICINE

## 2019-03-27 PROCEDURE — 12000000 ZZH R&B MED SURG/OB

## 2019-03-27 PROCEDURE — 83690 ASSAY OF LIPASE: CPT | Performed by: INTERNAL MEDICINE

## 2019-03-27 PROCEDURE — 99232 SBSQ HOSP IP/OBS MODERATE 35: CPT | Performed by: INTERNAL MEDICINE

## 2019-03-27 PROCEDURE — A9270 NON-COVERED ITEM OR SERVICE: HCPCS | Performed by: INTERNAL MEDICINE

## 2019-03-27 PROCEDURE — 36415 COLL VENOUS BLD VENIPUNCTURE: CPT | Performed by: INTERNAL MEDICINE

## 2019-03-27 PROCEDURE — 85025 COMPLETE CBC W/AUTO DIFF WBC: CPT | Performed by: INTERNAL MEDICINE

## 2019-03-27 RX ADMIN — APIXABAN 10 MG: 5 TABLET, FILM COATED ORAL at 09:26

## 2019-03-27 RX ADMIN — FUROSEMIDE 40 MG: 40 TABLET ORAL at 09:26

## 2019-03-27 RX ADMIN — APIXABAN 10 MG: 5 TABLET, FILM COATED ORAL at 21:55

## 2019-03-27 RX ADMIN — PANTOPRAZOLE SODIUM 40 MG: 40 TABLET, DELAYED RELEASE ORAL at 06:03

## 2019-03-27 RX ADMIN — METOPROLOL TARTRATE 50 MG: 50 TABLET ORAL at 21:55

## 2019-03-27 RX ADMIN — METOPROLOL TARTRATE 50 MG: 50 TABLET ORAL at 09:26

## 2019-03-27 ASSESSMENT — ACTIVITIES OF DAILY LIVING (ADL)
ADLS_ACUITY_SCORE: 11

## 2019-03-27 ASSESSMENT — MIFFLIN-ST. JEOR: SCORE: 1750.61

## 2019-03-27 NOTE — PLAN OF CARE
Patient A&O x4, Denies chest pain/tightness/pressure and SR with PACs on tele and VSS,  Lungs are diminished at bases  and on RA, up IND in room , on cardiac diet, skin is rash at back and L arm infiltrated at hand, and  Cephalic vain thrombosis,  voiding well and last BM 3/26. IV is in R arm and SL. Abdomen MR done. Plan for possible Discharge in AM.

## 2019-03-27 NOTE — PROGRESS NOTES
[facility]    Hospitalist Progress Note    Assessment & Plan   Sanjeev Blackmon is a 63 year-old male admitted on 3/19/2019 after presenting with 1 day of dry heaves with epigastrium pain, and fevers to 102F, clinical picture consistent with sepsis most likely due to intra-abdominal pathology, although no definite source identified, and probable pneumonia, treated with antibiotics:      Impression:   Principal Problem:    Sepsis -- ?source. Better but not resolved   -- improving   -- sludge in gall bladder appears incidental (Neg HIDA scan)   -- WBC still elevated, Lipase elevated   -- Will discuss with [facility] (?Image abdomin)      Pancreatitis -- ?etiology   -- clear liquids   -- check lipase in AM      Atrial flutter with rapid ventricular response    -- Metoprolol 50 mg bid   -- ELI and successful cardioversion 3/25/19   -- Eliquid 5 mg bid (post cardioversion)   -- [id number] Score 1 (1.3% annual stroke risk --low)      Swollen Left arm w Cephalic Vein Thrombus   -- S/P extravasation in hand and elbow   -- left arm ultrasound today shows thrombus   -- on Eliquis as above, will increase to 10 mg bid while here      Gastroesophageal reflux disease with esophagitis   -- may have been contributing to epigastric abdominal pain   -- improved with PPI      BRANDEN (acute kidney injury) -- resolved    Pneumonia LLL   -- PO Ceftin      Plan:  Clear liquids, monitor left arm, BMP and Lipase in AM.     DVT Prophylaxis: Pneumatic Compression Devices  Code Status: Full Code    Disposition: Expected discharge potentially tomorrow if Lipase is better.      Samson Jacobs MD  Pager [phone]  Cell Phone [phone]  Text Page (7am to 6pm)    Interval History   Feels OK, no pain with eating but abdomin still distended and left had and arm swollen (had IV infiltrate in left hand and antecubital fossa 2 days ago).  Has not been able to sleep.        Physical Exam   Temp: 98.9  F (37.2  C) Temp src: Oral  BP: 138/89 Pulse: 90 Heart Rate: 83 Resp: 18 SpO2: 94 % O2 Device: None (Room air)    Vitals:    03/25/19 0519 03/26/19 0054 03/27/19 0300   Weight: 101.8 kg (224 lb 6.9 oz) 100.3 kg (221 lb 3.2 oz) 97.5 kg (215 lb)     Vital Signs with Ranges  Temp:  [98.9  F (37.2  C)-99.8  F (37.7  C)] 98.9  F (37.2  C)  Pulse:  [84-90] 90  Heart Rate:  [83-93] 83  Resp:  [16-18] 18  BP: (134-150)/() 138/89  SpO2:  [93 %-98 %] 94 %  I/O last 3 completed shifts:  In: 850 [P.O.:850]  Out: 2600 [Urine:2600]    # Pain Assessment:  Current Pain Score 3/26/2019   Patient currently in pain? denies   Pain score (0-10) -   Sanjeev villalobos pain level was assessed and he currently denies pain.        Constitutional: Awake, alert, cooperative, no apparent distress  Respiratory: Clear to auscultation bilaterally, no crackles or wheezing  Cardiovascular: Regular rate and rhythm, normal S1 and S2, and no murmur noted  GI: mildly distended, non-tender to palpation, bowel sounds present but decreased   Extrem: No calf tenderness, trace ankle edema. Left hand and elbow area puffy and mildly tender, redness improved, but hand to swollen to make a fist.   Neuro: Ox3, no focal motor or sensory deficits    Medications     - MEDICATION INSTRUCTIONS -         apixaban ANTICOAGULANT  10 mg Oral BID     furosemide  40 mg Oral Daily     metoprolol tartrate  50 mg Oral BID     pantoprazole  40 mg Oral QAM AC     sodium chloride (PF)  3 mL Intracatheter Q8H       Data   Recent Labs   Lab 03/27/19  0628 03/26/19  0533 03/25/19  0724 03/24/19  1039  03/22/19  0553 03/21/19  0947 03/21/19  0628  03/21/19  0143   WBC 12.7* 15.6* 16.4* 15.7*   < > 15.0*  --  14.6*   < >  --    HGB 12.4* 13.5 13.5  --    < > 11.6*  --  11.8*   < >  --    MCV 88 89 87  --    < > 90  --  91   < >  --     346 282  --    < > 136*  --  127*   < >  --    INR  --   --   --   --   --   --   --   --   --  1.39*   NA  --   --  140 140  --  140  --  142  --  142   POTASSIUM  --  3.9 3.8  2.9*  --  3.6  --  3.7  --  4.1   CHLORIDE  --   --  110* 108  --  112*  --  114*  --  111*   CO2  --   --  23 24  --  23  --  20  --  24   BUN  --   --  19 21  --  29  --  32*  --  35*   CR  --   --  1.07 1.15  --  1.23  --  1.28*  --  1.50*   ANIONGAP  --   --  7 8  --  5  --  8  --  7   LEANNA  --   --  8.1* 8.1*  --  8.0*  --  7.9*  --  8.1*   GLC  --   --  95 94  --  92  --  108*  --  78   ALBUMIN  --  1.9* 1.8*  --   --   --   --  1.9*   < >  --    PROTTOTAL  --  6.9 6.7*  --   --   --   --  6.1*   < >  --    BILITOTAL  --  0.7 0.8  --   --   --   --  1.0   < >  --    ALKPHOS  --  102 113  --   --   --   --  41   < >  --    ALT  --  24 24  --   --   --   --  17   < >  --    AST  --  30 33  --   --   --   --  22   < >  --    LIPASE 3,846* 2,823* 2,875*  --   --   --   --  94  --  128   TROPI  --   --   --   --   --   --  0.042 0.039  Canceled, Test credited  --  0.069*    < > = values in this interval not displayed.       Imaging:   Recent Results (from the past 24 hour(s))   US Upper Extremity Venous Duplex Left    Narrative    PROCEDURE:  Venous Doppler ultrasound of the left upper extremity    DATE OF PROCEDURE:  3/26/2019 10:22 AM    CLINICAL HISTORY/INDICATION:   rule out DVT, left arm swollen    COMPARISON:   None relevant    TECHNIQUE:   Grayscale, color-flow, and spectral waveform analysis were performed  of the deep veins of the left upper extremity    FINDINGS:   The left jugular vein demonstrates normal compressibility, color-flow  and spectral waveform.    The left subclavian vein, axillary vein, brachial vein and basilic  vein demonstrate normal compressibility, spectral waveform, color flow  and augmentation.    Occlusive thrombus is present within the cephalic vein extending from  the antecubital fossa proximally to the mid humerus. Additionally,  thrombus is present in the peripheral cephalic vein near the wrist  extending into a branch of the cephalic vein over the dorsum of the  hand.       Impression    IMPRESSION:   1. Occlusive thrombus within the cephalic vein extending from the mid  humerus to the antecubital fossa as well as in the most peripheral  portions overlying the wrist and extending into a side branch over the  dorsum of the hand.    ALYSON ZENDEJAS MD   MR Abdomen MRCP w/o & w Contrast    Narrative    MR CHOLANGIOPANCREATOGRAPHY  3/26/2019 4:45 PM     HISTORY: Pancreatitis suspected, atypical presentation/labs. Abdominal  distention, elevated WBC/lipase.    COMPARISON: CT scan from 3/20/2019    TECHNIQUE: Multiplanar, multisequence images of the abdomen acquired  before and after administration of 10mL Gadavist intravenous contrast.    FINDINGS: Artifact from tiny metallic clips in the bilateral upper  abdomen limits portions of the study. Trace amount of fluid around the  gallbladder. No definite gallbladder wall thickening or gallstones  demonstrated. No biliary dilatation or choledocholithiasis  demonstrated. No pancreatic ductal dilatation. No sidebranch  visualization. Signal intensity of the pancreas is unremarkable.  Pancreas enhances throughout. Minimal stranding is seen in the region  of the tail of the pancreas which is nonspecific, but could represent  pancreatitis. Some fluid extends inferiorly along the border of the  anterior pararenal space and Gerota's fascia. Minimal bilateral  effusions and bibasilar atelectasis and/or infiltrates. Liver, spleen,  adrenal glands, and kidneys demonstrate no worrisome focal lesion.  Renal cysts bilaterally noted.      Impression    IMPRESSION: Some fluid and stranding most associated with the body and  tail of the pancreas extending inferiorly along Gerota's fascia could  indicate pancreatitis. No definite biliary dilatation or  choledocholithiasis to suggest gallstone pancreatitis. No  cholecystitis demonstrated.    LEMUEL DUNCAN MD

## 2019-03-27 NOTE — PLAN OF CARE
Alert and oriented. VSS on room air. Denies pain. Tele NSR. Tolerating diet. Up independently. Possible discharge today.

## 2019-03-27 NOTE — PROVIDER NOTIFICATION
MD Notification    Notified Person: MD    Notified Person Name: [facility]    Notification Date/Time: 1125    Notification Interaction: phone    Purpose of Notification: redness of iv extravasation spreading    Orders Received: MD aware     Comments:

## 2019-03-27 NOTE — PROGRESS NOTES
"GASTROENTEROLOGY PROGRESS NOTE     SUBJECTIVE:   Abdominal distention much improved over the last 3 days, but still present. No nausea or vomiting. No pain. White count starting to trend down. Lipase 2,800-->3800.  LFTs normal.   Noted worsening redness from iv extravasation site.   Antibiotics thought to be causing the rash on his back - these were discontinued.     OBJECTIVE:   /89 (BP Location: Right arm)   Pulse 90   Temp 98.9  F (37.2  C) (Oral)   Resp 18   Ht 1.753 m (5' 9\")   Wt 97.5 kg (215 lb)   SpO2 94%   BMI 31.75 kg/m     Temp (24hrs), Av.6  F (37  C), Min:98.2  F (36.8  C), Max:99.1  F (37.3  C)     Patient Vitals for the past 72 hrs:   Weight   19 0300 97.5 kg (215 lb)   19 0054 100.3 kg (221 lb 3.2 oz)   19 0519 101.8 kg (224 lb 6.9 oz)        Intake/Output Summary (Last 24 hours) at 3/25/2019 1322  Last data filed at 3/25/2019 1000  Gross per 24 hour   Intake 90 ml   Output 1200 ml   Net -1110 ml      PHYSICAL EXAM   Constitutional: Sitting up in the chair, no acute distress  Cardiovascular: Regular rate and rhythm. No murmurs rubs or gallops  Respiratory: Clear to auscultation bilaterally  Abdomen: Soft, +mildly distended, normally active bowel sounds. No guarding or rebound tenderness.    Additional Comments:   ROS, FH, SH: See initial GI consult for details.     I have reviewed the patient's new clinical lab results:   Recent Labs   Lab Test 19  0628 19  0533 19  0724  19  0143   WBC 12.7* 15.6* 16.4*   < >  --    HGB 12.4* 13.5 13.5   < >  --    MCV 88 89 87   < >  --     346 282   < >  --    INR  --   --   --   --  1.39*    < > = values in this interval not displayed.      Recent Labs   Lab Test 19  0533 19  0724 19  1039 19  0553   NA  --  140 140 140   POTASSIUM 3.9 3.8 2.9* 3.6   CHLORIDE  --  110* 108 112*   CO2  --   23   BUN  --   29   CR  --  1.07 1.15 1.23   ANIONGAP  --  7 8 5   LEANNA  --  " 8.1* 8.1* 8.0*      Recent Labs   Lab Test 03/27/19  0628 03/26/19  0533 03/25/19  0724 03/21/19  0628  03/20/19  0325   ALBUMIN  --  1.9* 1.8* 1.9*   < >  --    BILITOTAL  --  0.7 0.8 1.0   < >  --    ALT  --  24 24 17   < >  --    AST  --  30 33 22   < >  --    ALKPHOS  --  102 113 41   < >  --    PROTEIN  --   --   --   --   --  100*   LIPASE 3,846* 2,823* 2,875* 94   < >  --     < > = values in this interval not displayed.     MRCP 3/26/19   FINDINGS: Artifact from tiny metallic clips in the bilateral upper  abdomen limits portions of the study. Trace amount of fluid around the  gallbladder. No definite gallbladder wall thickening or gallstones  demonstrated. No biliary dilatation or choledocholithiasis  demonstrated. No pancreatic ductal dilatation. No sidebranch  visualization. Signal intensity of the pancreas is unremarkable.  Pancreas enhances throughout. Minimal stranding is seen in the region  of the tail of the pancreas which is nonspecific, but could represent  pancreatitis. Some fluid extends inferiorly along the border of the  anterior pararenal space and Gerota's fascia. Minimal bilateral  effusions and bibasilar atelectasis and/or infiltrates. Liver, spleen,  adrenal glands, and kidneys demonstrate no worrisome focal lesion.  Renal cysts bilaterally noted.                                             IMPRESSION: Some fluid and stranding most associated with the body and  tail of the pancreas extending inferiorly along Gerota's fascia could  indicate pancreatitis. No definite biliary dilatation or  choledocholithiasis to suggest gallstone pancreatitis. No  cholecystitis demonstrated.    Assessment:    Esophagitis  Pancreatitis   62 y/o male who presented with fevers, nausea/dry heaves, and epigastric pain, found to be tachycardic /hypotensive, with an elevated lactate and leukocytosis (20K).  CT 3/19 with inflammatory changes in the upper abdomen and retroperitoneum, abutting the gastric antrum and  pancreas.  Lipase was normal, but increased to 2800 on 3/25. US with GB wall thickening and sludge, but HIDA negative for acute cholecystitis.    EGD 3/19 with LA grade B esophagitis and small HH, but no etiology for his symptoms.   Repeat CT 3/20 unchanged. Lactate, and bilirubin all improved. WBC initially improved, but increased again.   Presenting symptoms were consistent with pancreatitis (N/V, epigastric pain). His lipase was normal upon admission, but was 2800 on 3/25. Initial CT 3/19 did indicate possible pancreatitis. No report of heavy alcohol. Possible biliary etiology. MRCP 3/26 showed evidence of pancreatitis without biliary dilation or choledocholithiasis to suggest gallstone pancreatitis.   Course has been complicated by atrial flutter with RVR. Had ELI with cardioversion 3/25.     Plan:    -- OK for low fat diet given no abdominal pain (diet as tolerated)   -- No alcohol until EUS   -- No need to follow lipase  -- Continue PPI daily for 2 months   -- He does not need to be hospitalized from pancreatitis standpoint  -- will need clearance from other medical issues from hospitalist   -- Will arrange outpatient EUS in 6 weeks and biliary MD follow up in 2 weeks     This patient was discussed with Dr. Evans.     Aileen Garnett, [facility]   Cell: [phone]  Monday through Friday [id number]  Office: [phone]

## 2019-03-27 NOTE — PROVIDER NOTIFICATION
MD Notification    Notified Person: MD    Notified Person Name: [facility]    Notification Date/Time: 940    Notification Interaction: in person    Purpose of Notification: okay to transfer off floor orders    Orders Received: placing order in 10 min    Comments:

## 2019-03-27 NOTE — PROGRESS NOTES
Patient alert and oriented. VSS. Patient denies shortness of breath. Minimal discomfort in the L hand, patient refused pain medications for relief, elevation of arm was encouraged. Redness to L hand outline d/t spreading, arm is still edematous and warm to the touch. Telemetry in SR. Continuing to diurese with lasix, good urine output. Patient transferred to station 66 and all belongings sent with patient.    RN called report to Marlene on 66

## 2019-03-28 VITALS
HEIGHT: 69 IN | SYSTOLIC BLOOD PRESSURE: 130 MMHG | OXYGEN SATURATION: 95 % | RESPIRATION RATE: 16 BRPM | BODY MASS INDEX: 29.67 KG/M2 | DIASTOLIC BLOOD PRESSURE: 91 MMHG | HEART RATE: 75 BPM | TEMPERATURE: 99.2 F | WEIGHT: 200.3 LBS

## 2019-03-28 LAB
ANION GAP SERPL CALCULATED.3IONS-SCNC: 6 MMOL/L (ref 3–14)
BUN SERPL-MCNC: 16 MG/DL (ref 7–30)
CALCIUM SERPL-MCNC: 7.9 MG/DL (ref 8.5–10.1)
CHLORIDE SERPL-SCNC: 106 MMOL/L (ref 94–109)
CO2 SERPL-SCNC: 22 MMOL/L (ref 20–32)
CREAT SERPL-MCNC: 1.11 MG/DL (ref 0.66–1.25)
ERYTHROCYTE [DISTWIDTH] IN BLOOD BY AUTOMATED COUNT: 14.3 % (ref 10–15)
GFR SERPL CREATININE-BSD FRML MDRD: 69 ML/MIN/{1.73_M2}
GLUCOSE SERPL-MCNC: 106 MG/DL (ref 70–99)
HCT VFR BLD AUTO: 37.3 % (ref 40–53)
HGB BLD-MCNC: 12.3 G/DL (ref 13.3–17.7)
LIPASE SERPL-CCNC: 4192 U/L (ref 73–393)
MCH RBC QN AUTO: 29.1 PG (ref 26.5–33)
MCHC RBC AUTO-ENTMCNC: 33 G/DL (ref 31.5–36.5)
MCV RBC AUTO: 88 FL (ref 78–100)
PLATELET # BLD AUTO: 313 10E9/L (ref 150–450)
POTASSIUM SERPL-SCNC: 3.7 MMOL/L (ref 3.4–5.3)
RBC # BLD AUTO: 4.22 10E12/L (ref 4.4–5.9)
SODIUM SERPL-SCNC: 134 MMOL/L (ref 133–144)
WBC # BLD AUTO: 14.3 10E9/L (ref 4–11)

## 2019-03-28 PROCEDURE — 25000132 ZZH RX MED GY IP 250 OP 250 PS 637: Performed by: INTERNAL MEDICINE

## 2019-03-28 PROCEDURE — A9270 NON-COVERED ITEM OR SERVICE: HCPCS | Performed by: INTERNAL MEDICINE

## 2019-03-28 PROCEDURE — 83690 ASSAY OF LIPASE: CPT | Performed by: INTERNAL MEDICINE

## 2019-03-28 PROCEDURE — 80048 BASIC METABOLIC PNL TOTAL CA: CPT | Performed by: INTERNAL MEDICINE

## 2019-03-28 PROCEDURE — 99239 HOSP IP/OBS DSCHRG MGMT >30: CPT | Performed by: INTERNAL MEDICINE

## 2019-03-28 PROCEDURE — 85027 COMPLETE CBC AUTOMATED: CPT | Performed by: INTERNAL MEDICINE

## 2019-03-28 PROCEDURE — 99207 ZZC CDG-CHARGE REQUIRED MANUAL ENTRY: CPT | Performed by: INTERNAL MEDICINE

## 2019-03-28 PROCEDURE — 36415 COLL VENOUS BLD VENIPUNCTURE: CPT | Performed by: INTERNAL MEDICINE

## 2019-03-28 RX ORDER — PANTOPRAZOLE SODIUM 40 MG/1
40 TABLET, DELAYED RELEASE ORAL
Qty: 30 TABLET | Refills: 1 | Status: SHIPPED | OUTPATIENT
Start: 2019-03-29 | End: 2019-05-03

## 2019-03-28 RX ORDER — METOPROLOL TARTRATE 50 MG
50 TABLET ORAL 2 TIMES DAILY
Qty: 60 TABLET | Refills: 3 | Status: SHIPPED | OUTPATIENT
Start: 2019-03-28 | End: 2019-04-08

## 2019-03-28 RX ORDER — ACETAMINOPHEN 325 MG/1
650 TABLET ORAL EVERY 4 HOURS PRN
Start: 2019-03-28 | End: 2019-04-08

## 2019-03-28 RX ADMIN — PANTOPRAZOLE SODIUM 40 MG: 40 TABLET, DELAYED RELEASE ORAL at 06:08

## 2019-03-28 RX ADMIN — METOPROLOL TARTRATE 50 MG: 50 TABLET ORAL at 08:46

## 2019-03-28 RX ADMIN — APIXABAN 10 MG: 5 TABLET, FILM COATED ORAL at 08:46

## 2019-03-28 RX ADMIN — ACETAMINOPHEN 650 MG: 325 TABLET, FILM COATED ORAL at 06:08

## 2019-03-28 ASSESSMENT — ACTIVITIES OF DAILY LIVING (ADL)
ADLS_ACUITY_SCORE: 11

## 2019-03-28 ASSESSMENT — MIFFLIN-ST. JEOR: SCORE: 1683.93

## 2019-03-28 NOTE — PROGRESS NOTES
"GASTROENTEROLOGY PROGRESS NOTE     SUBJECTIVE:   Abdominal distention much improved, but still present. No nausea or vomiting. No pain. White count overall trending down, a little up today 14. Lipase increasing, but remains asymptomatic. LFTs normal.   Noted worsening redness from iv extravasation site.     OBJECTIVE:   BP (!) 130/91 (BP Location: Right arm)   Pulse 75   Temp 99.2  F (37.3  C) (Oral)   Resp 16   Ht 1.753 m (5' 9\")   Wt 90.9 kg (200 lb 4.8 oz)   SpO2 95%   BMI 29.58 kg/m     Temp (24hrs), Av.6  F (37  C), Min:98.2  F (36.8  C), Max:99.1  F (37.3  C)     Patient Vitals for the past 72 hrs:   Weight   19 0500 90.9 kg (200 lb 4.8 oz)   19 0300 97.5 kg (215 lb)   19 0054 100.3 kg (221 lb 3.2 oz)        Intake/Output Summary (Last 24 hours) at 3/25/2019 1322  Last data filed at 3/25/2019 1000  Gross per 24 hour   Intake 90 ml   Output 1200 ml   Net -1110 ml      PHYSICAL EXAM   Constitutional: Sitting up in bed, visiting with family, no acute distress  Cardiovascular: Regular rate and rhythm. No murmurs rubs or gallops  Respiratory: Clear to auscultation bilaterally  Abdomen: Soft, +mildly distended, normally active bowel sounds. No guarding or rebound tenderness.    Additional Comments:   ROS, FH, SH: See initial GI consult for details.     I have reviewed the patient's new clinical lab results:   Recent Labs   Lab Test 19  0811 19  0628 19  0533  19  0143   WBC 14.3* 12.7* 15.6*   < >  --    HGB 12.3* 12.4* 13.5   < >  --    MCV 88 88 89   < >  --     325 346   < >  --    INR  --   --   --   --  1.39*    < > = values in this interval not displayed.      Recent Labs   Lab Test 19  0811 19  0533 19  0724 19  1039     --  140 140   POTASSIUM 3.7 3.9 3.8 2.9*   CHLORIDE 106  --  110* 108   CO2 22  --  23 24   BUN 16  --  19 21   CR 1.11  --  1.07 1.15   ANIONGAP 6  --  7 8   LEANNA 7.9*  --  8.1* 8.1*      Recent Labs "   Lab Test 03/28/19  0811 03/27/19  0628 03/26/19  0533 03/25/19  0724 03/21/19  0628  03/20/19  0325   ALBUMIN  --   --  1.9* 1.8* 1.9*   < >  --    BILITOTAL  --   --  0.7 0.8 1.0   < >  --    ALT  --   --  24 24 17   < >  --    AST  --   --  30 33 22   < >  --    ALKPHOS  --   --  102 113 41   < >  --    PROTEIN  --   --   --   --   --   --  100*   LIPASE 4,192* 3,846* 2,823* 2,875* 94   < >  --     < > = values in this interval not displayed.     MRCP 3/26/19   FINDINGS: Artifact from tiny metallic clips in the bilateral upper  abdomen limits portions of the study. Trace amount of fluid around the  gallbladder. No definite gallbladder wall thickening or gallstones  demonstrated. No biliary dilatation or choledocholithiasis  demonstrated. No pancreatic ductal dilatation. No sidebranch  visualization. Signal intensity of the pancreas is unremarkable.  Pancreas enhances throughout. Minimal stranding is seen in the region  of the tail of the pancreas which is nonspecific, but could represent  pancreatitis. Some fluid extends inferiorly along the border of the  anterior pararenal space and Gerota's fascia. Minimal bilateral  effusions and bibasilar atelectasis and/or infiltrates. Liver, spleen,  adrenal glands, and kidneys demonstrate no worrisome focal lesion.  Renal cysts bilaterally noted.                                             IMPRESSION: Some fluid and stranding most associated with the body and  tail of the pancreas extending inferiorly along Gerota's fascia could  indicate pancreatitis. No definite biliary dilatation or  choledocholithiasis to suggest gallstone pancreatitis. No  cholecystitis demonstrated.    Assessment:    Esophagitis  Pancreatitis   64 y/o male who presented with fevers, nausea/dry heaves, and epigastric pain, found to be tachycardic /hypotensive, with an elevated lactate and leukocytosis (20K).  CT 3/19 with inflammatory changes in the upper abdomen and retroperitoneum, abutting the  gastric antrum and pancreas.  Lipase was normal, but increased to 2800 on 3/25. US with GB wall thickening and sludge, but HIDA negative for acute cholecystitis.    EGD 3/19 with LA grade B esophagitis and small HH, but no etiology for his symptoms.   Repeat CT 3/20 unchanged. Lactate, and bilirubin all improved. WBC initially improved, but increased again.   Presenting symptoms were consistent with pancreatitis (N/V, epigastric pain). His lipase was normal upon admission, but was 2800 on 3/25. Initial CT 3/19 did indicate possible pancreatitis. No report of heavy alcohol. Possible biliary etiology. MRCP 3/26 showed evidence of pancreatitis without biliary dilation or choledocholithiasis to suggest gallstone pancreatitis. No sludge, wall thickening, or gallstones noted on MRCP.   Course has been complicated by atrial flutter with RVR. Had ELI with cardioversion 3/25.     Plan:    -- OK for low fat diet given no abdominal pain (diet as tolerated)   -- No alcohol until EUS   -- No need to follow lipase  -- Continue PPI daily for 2 months   -- He does not need to be hospitalized from pancreatitis standpoint  -- will need clearance from other medical issues from hospitalist  -- Will arrange outpatient EUS in 6 weeks and biliary MD follow up in 2 weeks     This patient was discussed with Dr. Evans. No further recommendations. Will sign off.     Aileen Garnett CNP   [facility]   Cell: [phone]  Monday through Friday 7962-4107  Office: [phone]

## 2019-03-28 NOTE — PLAN OF CARE
Care Plan Summary Note:  ORIENTATION: A&Ox4, calm, cooperative   BEHAVIOR & AGGRESSION TOOL COLOR: Green  ABNL VS/O2: VSS   MOBILITY: Independent   PAIN MANAGMENT: Denied pain  DIET: Low Fat  BOWEL/BLADDER: Continent,   ABNL LAB/BG: Lipase 3846, WBC 12.7, Hgb 12.4  DRAIN/DEVICES: none  TELEMETRY: none  SKIN: pt has L upper arm DVT,  extravasation to L thumb/hand, both areas marked, rash to entire back - provider aware   TESTS/PROCEDURES: none  D/C DAY/GOALS/PLACE: Per GI note, pancreatitis is not keeping the pt here, pending discharge after clearance of other medical issues.   OTHER: none

## 2019-03-28 NOTE — PLAN OF CARE
Care Plan Summary Note:  ORIENTATION/BEHAVIOR: A/Ox4, calm cooperative  ABNL VS/O2: VSS on RA ex BP elevated 149/94.  MOBILITY/FALL RISK: Independent  PAIN MANAGMENT: Denies  DIET: Low fat  BOWEL/BLADDER: Urinal use, good UOP, diuresed on PO lasix. last BM today.  ABNL LAB/BG: WBC 12.7 today, lipase 3,846.   DRAIN/DEVICES: PIV SL  SKIN: Rash to back and redness/swelling to L arm/thumb/hand. Marked. L arm elevation encouraged. On eliquis for L arm DVT.  AGGRESSION TOOL COLOR: Green  D/C DAY/GOALS/PLACE: discharge potentially tomorrow if lipase improves.

## 2019-03-28 NOTE — DISCHARGE SUMMARY
[facility]    Discharge Summary  Hospitalist    Date of Admission:  3/19/2019  Date of Discharge:  3/28/2019  Discharging Provider: Samson Jacobs MD    Discharge Diagnoses   Principal Problem:    Sepsis  Active Problems:    Atrial flutter with rapid ventricular response (H)    Gastroesophageal reflux disease with esophagitis    BRANDEN (acute kidney injury) (H)    Pneumonia LLL    Thrombocytopenia (H)    Acute Left Arm DVT -- Cephalic Vein 3/26/19      History of Present Illness   63 year old male admitted on 3/19/2019 after presenting with 1 day of dry heaves with epigastrium pain, and fevers to 102. On presentation he is tachycardic with SBP in the low 90's. Oxygenation 90-91% on RA. Labs are significant for BUN/Cr 34/1.89 (baseline Cr 1.05), bili 2.5, liver enzymes o/w nl, lipase 332. Lactic acid 2.7, procal 23, WBC 20.3, hgb 15.8. D-dimer ordered by RN in triage due to pain in the abdomen with deep breaths was up at 1.4. Otherwise, no chest pain, SOB, leg pain or risk factors. Patient denies any hematemesis, black or bloody stools. Just took ibuprofen 2 tabs x 3 in the last 2 days. No recent EtOH. CT abdomen/pelvis shows inflammatory changes in upper abdomen retroperitoneum around the gastric antrum, suggestive of pancreatitis vs gastritis/gastric ulcer. RUQ U/S shows sludge within the gallbladder with mild wall thickening measuring 0.3 cm, possible cholecystitis. No biliary dilation.     Hospital Course   Admitted to medical telemetry, treated with IV Zosyn for possible intra-abdominal infection, and to cover pneumonia.  Initially on 3 LPM O2.  Seen by surgery, had an NM hepato-biliary scan which was normal -- no evidence of acute cholecystitis.  Developed abdominal bloating and ileus.  After several days weight had increased 39 lbs.  Albumin dropped to 1.8 which contributed to fluid third spacing.  Initial creatinine of 1.89 improved to 1.11 at time of discharge.      His Lipase was  initially normal, and after being here six days his lipase then delfina from 94 up to 2875, and continued to be elevated despite being on clear liquids, and no abdominal pain at this time.  Initially bilirubin was 2.5 but normal at 0.7 at discharge, and remaining LFT's normal.      He did have an MRCP on 3/26 which showed:  Some fluid and stranding most associated with the body and  tail of the pancreas extending inferiorly along Gerota's fascia could  indicate pancreatitis. No definite biliary dilatation or  choledocholithiasis to suggest gallstone pancreatitis. No  cholecystitis demonstrated.    HIs lipase at discharge was still 4192, but he had no pain with this. He was given lasix 40 mg daily, and by discharge his weight had dropped 29 lbs, still 10 lbs over his baseline weight.  He was seen by [facility] and they will see him in 2 weeks, and schedule an EUS in 4 to 6 weeks.     He did develop Atrial flutter while here with RVR, started on Metoprolol to help with rate control, cardiology consulted and placed on IV Amiodarone, but with no improved he underwent a ELI to make sure no thrombus present, then had Cardioversion and stayed in NSR.  He will take eliquis 5 mg bid for 2 months given the cardioversion, and will continue metoprolol to help control blood pressure.      He did have a drug extravasation in left hand and left antecubital fossa, had left arm swelling and pain, left arm US showed a cephalic vein thrombus, and the pain and swelling improved with starting Eliquis and expect the skin injury to resolve spontaneous, and he will watch for signs of infection.     Samson Jacobs MD  Pager: [phone]  Cell Phone:  [phone]       Significant Results and Procedures   As above    Pending Results   These results will be followed up by Dr. Jacobs  Unresulted Labs Ordered in the Past 30 Days of this Admission     No orders found from 1/18/2019 to 3/20/2019.          Code Status   Full Code       Primary  Care Physician   Timmy Calix    Physical Exam   Temp: 99.2  F (37.3  C) Temp src: Oral BP: (!) 130/91 Pulse: 75 Heart Rate: 75 Resp: 16 SpO2: 95 % O2 Device: None (Room air)    Vitals:    03/26/19 0054 03/27/19 0300 03/28/19 0500   Weight: 100.3 kg (221 lb 3.2 oz) 97.5 kg (215 lb) 90.9 kg (200 lb 4.8 oz)     Vital Signs with Ranges  Temp:  [98.1  F (36.7  C)-99.4  F (37.4  C)] 99.2  F (37.3  C)  Pulse:  [75] 75  Heart Rate:  [75-93] 75  Resp:  [16-22] 16  BP: (113-149)/(74-95) 130/91  SpO2:  [93 %-96 %] 95 %  I/O last 3 completed shifts:  In: -   Out: 3605 [Urine:3605]    Exam on discharge:   Abdomin soft, none tender, minimal distension  Ankles with trace edema    # Discharge Pain Plan:   - Patient currently has NO PAIN and is not being prescribed pain medications on discharge.      Discharge Disposition   Discharged to home  Condition at discharge: Stable    Consultations This Hospital Stay   GASTROENTEROLOGY IP CONSULT  SURGERY GENERAL IP CONSULT  PHARMACY TO DOSE HEPARIN  CARDIOLOGY IP CONSULT    Time Spent on this Encounter   I spent a total of 35 minutes discharging this patient.     Discharge Orders      Reason for your hospital stay    Pneumonia, pancreatitis, atrial flutter.     Follow-up and recommended labs and tests     Follow up with primary care provider, Timmy Calix, within 7 days, check CBC, BMP and Lipase then.  Follow up with [facility] in 2 weeks with Biliary Physician, and then schedule EUS with [facility] in 6 weeks.     Activity    Your activity upon discharge: moderate activity as tolerated.     Discharge Instructions    Call Dr. Lindsey at Pager [phone] if questions, or Cell Phone [phone].     Full Code     Diet    Follow this diet upon discharge: Orders Placed This Encounter      Low Fat Diet     Discharge Medications   Current Discharge Medication List      START taking these medications    Details   acetaminophen (TYLENOL) 325 MG  tablet Take 2 tablets (650 mg) by mouth every 4 hours as needed for mild pain    Associated Diagnoses: Pain      apixaban ANTICOAGULANT (ELIQUIS) 5 MG tablet Take 1 tablet (5 mg) by mouth 2 times daily  Qty: 60 tablet, Refills: 1    Comments: Stop after two months  Associated Diagnoses: Atrial flutter with rapid ventricular response (H)      metoprolol tartrate (LOPRESSOR) 50 MG tablet Take 1 tablet (50 mg) by mouth 2 times daily  Qty: 60 tablet, Refills: 3    Comments: Future refills by PCP Dr. Timmy Calix with phone number [phone].  Associated Diagnoses: Atrial flutter with rapid ventricular response (H)      pantoprazole (PROTONIX) 40 MG EC tablet Take 1 tablet (40 mg) by mouth every morning (before breakfast)  Qty: 30 tablet, Refills: 1    Comments: Future refills by PCP Dr. Timmy Calix with phone number [phone].  Associated Diagnoses: Gastroesophageal reflux disease with esophagitis         CONTINUE these medications which have NOT CHANGED    Details   hypromellose-dextran (ARTIFICAL TEARS) 0.1-0.3 % ophthalmic solution as needed for dry eyes         STOP taking these medications       ibuprofen (ADVIL/MOTRIN) 200 MG tablet Comments:   Reason for Stopping:         oxymetazoline (AFRIN) 0.05 % nasal spray Comments:   Reason for Stopping:         pseudoePHEDrine (SUDAFED) 30 MG tablet Comments:   Reason for Stopping:             Allergies   No Known Allergies  Data   Most Recent 3 CBC's:  Recent Labs   Lab Test 03/28/19  0811 03/27/19  0628 03/26/19  0533   WBC 14.3* 12.7* 15.6*   HGB 12.3* 12.4* 13.5   MCV 88 88 89    325 346      Most Recent 3 BMP's:  Recent Labs   Lab Test 03/28/19  0811 03/26/19  0533 03/25/19  0724 03/24/19  1039     --  140 140   POTASSIUM 3.7 3.9 3.8 2.9*   CHLORIDE 106  --  110* 108   CO2 22  --  23 24   BUN 16  --  19 21   CR 1.11  --  1.07 1.15   ANIONGAP 6  --  7 8   LEANNA 7.9*  --  8.1* 8.1*   *  --  95 94     Most Recent 2 LFT's:  Recent Labs    Lab Test 03/26/19  0533 03/25/19  0724   AST 30 33   ALT 24 24   ALKPHOS 102 113   BILITOTAL 0.7 0.8     Most Recent INR's and Anticoagulation Dosing History:  Anticoagulation Dose History     Recent Dosing and Labs Latest Ref Rng & Units 3/21/2019    INR 0.86 - 1.14 1.39(H)        Most Recent 3 Troponin's:  Recent Labs   Lab Test 03/21/19  0947 03/21/19  0628 03/21/19  0143   TROPI 0.042 0.039  Canceled, Test credited 0.069*     Most Recent Cholesterol Panel:  Recent Labs   Lab Test 02/27/15  1117   CHOL 233*   *   HDL 78   TRIG 66     Most Recent 6 Bacteria Isolates From Any Culture (See EPIC Reports for Culture Details):  Recent Labs   Lab Test 03/19/19  1545 03/19/19  1520   CULT No growth No growth     Most Recent TSH, T4 and A1c Labs:No lab results found.

## 2019-03-28 NOTE — PLAN OF CARE
A/O. VSS. No change. IV dced. Discharge instructions and meds given to patient. Patient discharged via w/c and escort to wife and home.

## 2019-03-29 ENCOUNTER — TELEPHONE (OUTPATIENT)
Dept: CARDIOLOGY | Facility: CLINIC | Age: 64
End: 2019-03-29

## 2019-03-29 ENCOUNTER — HOSPITAL ENCOUNTER (OUTPATIENT)
Facility: CLINIC | Age: 64
End: 2019-03-29
Attending: INTERNAL MEDICINE | Admitting: INTERNAL MEDICINE
Payer: COMMERCIAL

## 2019-03-29 DIAGNOSIS — I48.92 ATRIAL FLUTTER (H): Primary | ICD-10-CM

## 2019-03-29 NOTE — TELEPHONE ENCOUNTER
Patient was evaluated by cardiology while inpatient for sudden onset of abdominal pain, nausea, high grade fever-sepsis of unknown origin and accompanied with A. Flutter with RVR. Regarding pt's A. Flutter, pt was started on Amiodarone and had ELI with successful DCCV on 3/25/19. Started on Eliquis and Metoprolol. Called patient to discuss any post hospital d/c questions he may have, review medication changes, and confirm f/u appts. Patient denied any questions regarding new medications or changes to PTA medications. Patient denied any SOB, chest pain, or light headedness. RN confirmed with patient that he has an apt scheduled on 4/8/19 with [facility] Jenniffer Chen. Does not have 30 day event monitor scheduled. Order placed and call transferred to scheduling. Patient advised to call clinic with any cardiac related questions or concerns prior to this lala't. Patient verbalized understanding and agreed with plan. JENNIFER Bourgeois RN.

## 2019-04-01 ENCOUNTER — HOSPITAL ENCOUNTER (OUTPATIENT)
Dept: CARDIOLOGY | Facility: CLINIC | Age: 64
Discharge: HOME | End: 2019-04-01
Attending: INTERNAL MEDICINE | Admitting: INTERNAL MEDICINE
Payer: COMMERCIAL

## 2019-04-01 DIAGNOSIS — I48.92 ATRIAL FLUTTER (H): ICD-10-CM

## 2019-04-01 PROCEDURE — 93270 REMOTE 30 DAY ECG REV/REPORT: CPT

## 2019-04-01 PROCEDURE — 93272 ECG/REVIEW INTERPRET ONLY: CPT | Performed by: INTERNAL MEDICINE

## 2019-04-02 ENCOUNTER — DOCUMENTATION ONLY (OUTPATIENT)
Dept: CARDIOLOGY | Facility: CLINIC | Age: 64
End: 2019-04-02

## 2019-04-02 NOTE — PROGRESS NOTES
Baseline strip received from [facility] on 4/1/19 showing sinus rhythm at 69 BPM.     1. Paroxysmal atrial flutter    Amiodarone loaded and successfully DC cardioverted    Discharge patient on Eliquis 5 mg twice per day, 30-day event monitor and follow-up in cardiology clinic in 2-4 weeks    Strips placed in event folder. Patient has upcoming OV on 4/8/19 with Jenniffer Chen for post hospital follow up. Continue to monitor.

## 2019-04-04 ENCOUNTER — OFFICE VISIT (OUTPATIENT)
Dept: FAMILY MEDICINE | Facility: CLINIC | Age: 64
End: 2019-04-04

## 2019-04-04 VITALS
WEIGHT: 186 LBS | DIASTOLIC BLOOD PRESSURE: 78 MMHG | SYSTOLIC BLOOD PRESSURE: 120 MMHG | HEART RATE: 83 BPM | RESPIRATION RATE: 16 BRPM | OXYGEN SATURATION: 98 % | BODY MASS INDEX: 27.47 KG/M2

## 2019-04-04 DIAGNOSIS — D69.6 THROMBOCYTOPENIA (H): ICD-10-CM

## 2019-04-04 DIAGNOSIS — I82.622 ARM DVT (DEEP VENOUS THROMBOEMBOLISM), ACUTE, LEFT (H): ICD-10-CM

## 2019-04-04 DIAGNOSIS — I48.92 ATRIAL FLUTTER WITH RAPID VENTRICULAR RESPONSE (H): ICD-10-CM

## 2019-04-04 DIAGNOSIS — K85.10 ACUTE BILIARY PANCREATITIS, UNSPECIFIED COMPLICATION STATUS: Primary | ICD-10-CM

## 2019-04-04 LAB
% GRANULOCYTES: 62 % (ref 42.2–75.2)
HCT VFR BLD AUTO: 41.1 % (ref 39–51)
HEMOGLOBIN: 13.5 G/DL (ref 13.4–17.5)
LYMPHOCYTES NFR BLD AUTO: 28.8 % (ref 20.5–51.1)
MCH RBC QN AUTO: 28.6 PG (ref 27–31)
MCHC RBC AUTO-ENTMCNC: 32.9 G/DL (ref 33–37)
MCV RBC AUTO: 87 FL (ref 80–100)
MONOCYTES NFR BLD AUTO: 9.2 % (ref 1.7–9.3)
PLATELET # BLD AUTO: 362 K/UL (ref 140–450)
RBC # BLD AUTO: 4.72 X10/CMM (ref 4.2–5.9)
WBC # BLD AUTO: 7.5 X10/CMM (ref 3.8–11)

## 2019-04-04 PROCEDURE — 36415 COLL VENOUS BLD VENIPUNCTURE: CPT | Performed by: FAMILY MEDICINE

## 2019-04-04 PROCEDURE — 85025 COMPLETE CBC W/AUTO DIFF WBC: CPT | Performed by: FAMILY MEDICINE

## 2019-04-04 PROCEDURE — 99214 OFFICE O/P EST MOD 30 MIN: CPT | Performed by: FAMILY MEDICINE

## 2019-04-04 NOTE — LETTER
[facility] [address] [place]  [address]  Phone: [phone]    April 8, 2019      Sanjeev Blackmon  [address] [facility] [address]              Dear Sanjeev,    I am writing to report that your included test results are slowly improving        Sincerely,     Timmy Calix M.D.    Results for orders placed or performed in visit on 04/04/19   CBC with Diff/Plt ([facility])   Result Value Ref Range    WBC x10/cmm 7.5 3.8 - 11.0 x10/cmm    % Lymphocytes 28.8 20.5 - 51.1 %    % Monocytes 9.2 1.7 - 9.3 %    % Granulocytes 62.0 42.2 - 75.2 %    RBC x10/cmm 4.72 4.2 - 5.9 x10/cmm    Hemoglobin 13.5 13.4 - 17.5 g/dl    Hematocrit 41.1 39 - 51 %    MCV 87.0 80 - 100 fL    MCH 28.6 27.0 - 31.0 pg    MCHC 32.9 (A) 33.0 - 37.0 g/dL    Platelet Count 362 140 - 450 K/uL   Comp. Metabolic Panel (14) ([facility])   Result Value Ref Range    Glucose 82 65 - 99 mg/dL    Urea Nitrogen 34 (H) 8 - 27 mg/dL    Creatinine 1.22 0.76 - 1.27 mg/dL    eGFR If NonAfricn Am 62 >59 mL/min/1.73    eGFR If Africn Am 71 >59 mL/min/1.73    BUN/Creatinine Ratio 28 (H) 10 - 24    Sodium 138 134 - 144 mmol/L    Potassium 4.8 3.5 - 5.2 mmol/L    Chloride 104 96 - 106 mmol/L    Total CO2 21 20 - 29 mmol/L    Calcium 9.3 8.6 - 10.2 mg/dL    Protein Total 8.7 (H) 6.0 - 8.5 g/dL    Albumin 4.0 3.6 - 4.8 g/dL    Globulin, Total 4.7 (H) 1.5 - 4.5 g/dL    A/G Ratio 0.9 (L) 1.2 - 2.2    Bilirubin Total 0.6 0.0 - 1.2 mg/dL    Alkaline Phosphatase 107 39 - 117 IU/L    AST 28 0 - 40 IU/L    ALT 29 0 - 44 IU/L    Narrative    Performed at:  01 - [facility] [address], [place], [place]  [id number]  : Alex Aguirre MD, Phone:  [phone]   Lipase  Serum ([facility])   Result Value Ref Range    Lipase 542 (H) 13 - 78 U/L    Narrative    Performed at:  01 - [facility] [address], [place], [place]  [id number]  : Alex Aguirre MD, Phone:  [phone]   Amylase  Serum ([facility])   Result Value Ref Range     Amylase 194 (H) 31 - 124 U/L    Narrative    Performed at:  01 - [facility] [address], [place], [place]  [id number]  : Alex Aguirre MD, Phone:  [phone]

## 2019-04-04 NOTE — PROGRESS NOTES
Problem(s) Oriented visit        SUBJECTIVE:                                                    Sanjeev Blackmon is a 63 year old male who presents to clinic today for the following health issues :        Hospital Follow-up Visit:    Hospital/Nursing Home/IP Rehab Facility: [facility]    Reason(s) for Admission: sepsis            Problems taking medications regularly:  None       Medication changes since discharge: None       Problems adhering to non-medication therapy:  None    Summary of hospitalization:  [facility] discharge summary reviewed      Discharge Summary  Hospitalist     Date of Admission:  3/19/2019  Date of Discharge:  3/28/2019  Discharging Provider: Samson Jacobs MD     Discharge Diagnoses   Principal Problem:    Sepsis  Active Problems:    Atrial flutter with rapid ventricular response (H)    Gastroesophageal reflux disease with esophagitis    BRANDEN (acute kidney injury) (H)    Pneumonia LLL    Thrombocytopenia (H)    Acute Left Arm DVT -- Cephalic Vein 3/26/19           History of Present Illness     63 year old male admitted on 3/19/2019 after presenting with 1 day of dry heaves with epigastrium pain, and fevers to 102. On presentation he is tachycardic with SBP in the low 90's. Oxygenation 90-91% on RA. Labs are significant for BUN/Cr 34/1.89 (baseline Cr 1.05), bili 2.5, liver enzymes o/w nl, lipase 332. Lactic acid 2.7, procal 23, WBC 20.3, hgb 15.8. D-dimer ordered by RN in triage due to pain in the abdomen with deep breaths was up at 1.4. Otherwise, no chest pain, SOB, leg pain or risk factors. Patient denies any hematemesis, black or bloody stools. Just took ibuprofen 2 tabs x 3 in the last 2 days. No recent EtOH. CT abdomen/pelvis shows inflammatory changes in upper abdomen retroperitoneum around the gastric antrum, suggestive of pancreatitis vs gastritis/gastric ulcer. RUQ U/S shows sludge within the gallbladder with mild wall thickening measuring 0.3 cm,  possible cholecystitis. No biliary dilation.           Hospital Course     Admitted to medical telemetry, treated with IV Zosyn for possible intra-abdominal infection, and to cover pneumonia.  Initially on 3 LPM O2.  Seen by surgery, had an NM hepato-biliary scan which was normal -- no evidence of acute cholecystitis.  Developed abdominal bloating and ileus.  After several days weight had increased 39 lbs.  Albumin dropped to 1.8 which contributed to fluid third spacing.  Initial creatinine of 1.89 improved to 1.11 at time of discharge.       His Lipase was initially normal, and after being here six days his lipase then delfina from 94 up to 2875, and continued to be elevated despite being on clear liquids, and no abdominal pain at this time.  Initially bilirubin was 2.5 but normal at 0.7 at discharge, and remaining LFT's normal.       He did have an MRCP on 3/26 which showed:  Some fluid and stranding most associated with the body and  tail of the pancreas extending inferiorly along Gerota's fascia could  indicate pancreatitis. No definite biliary dilatation or  choledocholithiasis to suggest gallstone pancreatitis. No  cholecystitis demonstrated.     HIs lipase at discharge was still 4192, but he had no pain with this. He was given lasix 40 mg daily, and by discharge his weight had dropped 29 lbs, still 10 lbs over his baseline weight.  He was seen by [facility] and they will see him in 2 weeks, and schedule an EUS in 4 to 6 weeks.      He did develop Atrial flutter while here with RVR, started on Metoprolol to help with rate control, cardiology consulted and placed on IV Amiodarone, but with no improved he underwent a ELI to make sure no thrombus present, then had Cardioversion and stayed in NSR.  He will take eliquis 5 mg bid for 2 months given the cardioversion, and will continue metoprolol to help control blood pressure.       He did have a drug extravasation in left hand and left antecubital fossa, had left arm  swelling and pain, left arm US showed a cephalic vein thrombus, and the pain and swelling improved with starting Eliquis and expect the skin injury to resolve spontaneous, and he will watch for signs of infection.      Samson Jacobs MD  Pager: [phone]  Cell Phone:  [phone]              Significant Results and Procedures         Diagnostic Tests/Treatments reviewed.  Follow up needed: none  Other Healthcare Providers Involved in Patient s Care:         None  Update since discharge: improved.     Post Discharge Medication Reconciliation: discharge medications reconciled and changed, per note/orders (see AVS).  Plan of care communicated with patient       Problem list, Medication list, Allergies, and Medical/Social/Surgical histories reviewed in EPIC and updated as appropriate.   Additional history: as documented    ROS:  General and CV completed and negative except as noted above    Histories:   Patient Active Problem List   Diagnosis     Nasal polyps     Advanced directives, counseling/discussion     Sepsis     Atrial flutter with rapid ventricular response (H)     Gastroesophageal reflux disease with esophagitis     BRANDEN (acute kidney injury) (H)     Pneumonia LLL     Thrombocytopenia (H)     Acute Left Arm DVT -- Cephalic Vein 3/26/19     Past Surgical History:   Procedure Laterality Date     ABLATE VEIN VARICOSE RADIO FREQUENCY WITH PHLEBECTOMY MULTIPLE STAB  2011    Dr. Do     ESOPHAGOSCOPY, GASTROSCOPY, DUODENOSCOPY (EGD), COMBINED N/A 3/22/2019    Procedure: COMBINED ESOPHAGOSCOPY, GASTROSCOPY, DUODENOSCOPY (EGD);  Surgeon: Yazmin Alvarez MD;  Location: [facility]     Scleral lesion excision  2006     SINUS SURGERY  1979     SINUS SURGERY  1989     SINUS SURGERY  1999     SINUS SURGERY  2010    Dr. Samson       Social History     Tobacco Use     Smoking status: Never Smoker     Smokeless tobacco: Never Used   Substance Use Topics     Alcohol use: Yes     Alcohol/week: 2.0 - 3.0 oz      Types: 4 - 6 Standard drinks or equivalent per week     Family History   Problem Relation Age of Onset     Coronary Artery Disease Father            OBJECTIVE:                                                    /78   Pulse 83   Resp 16   Wt 84.4 kg (186 lb)   SpO2 98%   BMI 27.47 kg/m    Body mass index is 27.47 kg/m .   APPEARANCE: = Relaxed and in no distress  Conj/Eyelids = noninjected and lids and lashes are without inflammation  PERRLA/Irises = Pupils Round Reactive to Light and Irisis without inflammation  Neck = No anterior or posterior adenopathy appreciated.  Thyroid = Not enlarged and no masses felt  Resp effort = Calm regular breathing  Breath Sounds = Good air movement with no rales or rhonchi in any lung fields  Heart Rate, Rhythm, & sounds (no Murm)  = Regular rate and rhythm with no S3, S4, or murmur appreciated.  Carotid Art's = Pulses full and equal and no bruits appreciated  Abdomen = Soft, nontender, no masses, & bowel sounds in all quadrants  Liver/Spleen = Normal span and no splenomegaly noted  Digits and Nails = FROM in all finger joints, no nail dystrophy  Ext (edema) = No pretibial edema noted or elsewhere  Musculsktl =  Strength and ROM of major joints are within normal limits  SKIN = absent significant rashes or lesions   Recent/Remote Memory = Alert and Oriented x 3  Mood/Affect = Cooperative and interested     ASSESSMENT/PLAN:                                                        Sanjeev was seen today for hospital f/u.    Diagnoses and all orders for this visit:    Acute biliary pancreatitis, unspecified complication status  Feeling better, and back to nrl.  -     Comp. Metabolic Panel (14) ([facility])  -     Lipase  Serum ([facility])  -     Amylase  Serum ([facility])    Thrombocytopenia (H)  Needs a chck  -     CBC with Diff/Plt ([facility])    Atrial flutter with rapid ventricular response (H)  Was cardioverted and been in nsr wearing       Arm DVT (deep venous thromboembolism),  acute, left (H)  On elequis for two months.      Work on weight loss  See Patient Instructions    The following health maintenance items are reviewed in Epic and correct as of today:  Health Maintenance   Topic Date Due     PHQ-2 Q1 YR  07/08/1965     HIV SCREEN (SYSTEM ASSIGNED)  07/08/1971     COLON CANCER SCREEN (SYSTEM ASSIGNED)  07/08/2003     ZOSTER IMMUNIZATION (1 of 2) 07/08/2003     ADVANCE DIRECTIVE PLANNING Q5 YRS  12/17/2017     MEDICARE ANNUAL WELLNESS VISIT  07/08/2018     FALL RISK ASSESSMENT  07/08/2018     PNEUMOCOCCAL IMMUNIZATION 65+ LOW/MEDIUM RISK (1 of 2 - PCV13) 07/08/2018     INFLUENZA VACCINE (1) 09/01/2018     LIPID SCREEN Q5 YR MALE (SYSTEM ASSIGNED)  02/27/2020     DTAP/TDAP/TD IMMUNIZATION (2 - Td) 11/23/2020     AORTIC ANEURYSM SCREENING (SYSTEM ASSIGNED)  Completed     HEPATITIS C SCREENING  Completed     IPV IMMUNIZATION  Aged Out     MENINGITIS IMMUNIZATION  Aged Out       Timmy Calix MD  [facility]  Family Practice  [facility]  [phone]    For any issues my office # is [phone]

## 2019-04-05 LAB
ALBUMIN SERPL-MCNC: 4 G/DL (ref 3.6–4.8)
ALBUMIN/GLOB SERPL: 0.9 {RATIO} (ref 1.2–2.2)
ALP SERPL-CCNC: 107 IU/L (ref 39–117)
ALT SERPL-CCNC: 29 IU/L (ref 0–44)
AMYLASE SERPL-CCNC: 194 U/L (ref 31–124)
AST SERPL-CCNC: 28 IU/L (ref 0–40)
BILIRUB SERPL-MCNC: 0.6 MG/DL (ref 0–1.2)
BUN SERPL-MCNC: 34 MG/DL (ref 8–27)
BUN/CREATININE RATIO: 28 (ref 10–24)
CALCIUM SERPL-MCNC: 9.3 MG/DL (ref 8.6–10.2)
CHLORIDE SERPLBLD-SCNC: 104 MMOL/L (ref 96–106)
CREAT SERPL-MCNC: 1.22 MG/DL (ref 0.76–1.27)
EGFR IF AFRICN AM: 71 ML/MIN/1.73
EGFR IF NONAFRICN AM: 62 ML/MIN/1.73
GLOBULIN, TOTAL: 4.7 G/DL (ref 1.5–4.5)
GLUCOSE SERPL-MCNC: 82 MG/DL (ref 65–99)
LIPASE SERPL-CCNC: 542 U/L (ref 13–78)
POTASSIUM SERPL-SCNC: 4.8 MMOL/L (ref 3.5–5.2)
PROT SERPL-MCNC: 8.7 G/DL (ref 6–8.5)
SODIUM SERPL-SCNC: 138 MMOL/L (ref 134–144)
TOTAL CO2: 21 MMOL/L (ref 20–29)

## 2019-04-08 ENCOUNTER — OFFICE VISIT (OUTPATIENT)
Dept: CARDIOLOGY | Facility: CLINIC | Age: 64
End: 2019-04-08
Payer: COMMERCIAL

## 2019-04-08 VITALS
BODY MASS INDEX: 26.77 KG/M2 | SYSTOLIC BLOOD PRESSURE: 124 MMHG | WEIGHT: 187 LBS | HEIGHT: 70 IN | DIASTOLIC BLOOD PRESSURE: 78 MMHG | HEART RATE: 56 BPM

## 2019-04-08 DIAGNOSIS — I48.92 ATRIAL FLUTTER WITH RAPID VENTRICULAR RESPONSE (H): Primary | ICD-10-CM

## 2019-04-08 PROCEDURE — 99214 OFFICE O/P EST MOD 30 MIN: CPT | Performed by: NURSE PRACTITIONER

## 2019-04-08 RX ORDER — METOPROLOL TARTRATE 50 MG
25 TABLET ORAL 2 TIMES DAILY
Qty: 60 TABLET | Refills: 3 | Status: SHIPPED | OUTPATIENT
Start: 2019-04-08 | End: 2019-05-03

## 2019-04-08 ASSESSMENT — MIFFLIN-ST. JEOR: SCORE: 1631.54

## 2019-04-08 NOTE — LETTER
4/8/2019    Timmy Calix MD  [address]  [facility] [address]    RE: Sanjeev Blackmon       Dear Colleague,    I had the pleasure of seeing Sanjeev Blackmon in the [facility].    Cardiology Clinic Progress Note  Sanjeev Blackmon MRN# [id number]   YOB: 1955 Age: 63 year old   Primary Cardiologist: Dr. Barros Reason for visit: post hospital follow up             Assessment and Plan:   Sanjeev Blackmon is a very pleasant 63 year old male with a recent history of atrial flutter/fibrillation in setting of sepsis. No prior cardiac history.     1.  Atrial flutter/fibrillation with RVR s/p cardioversion, in the setting of sepsis. No prior cardiac history. Low suspicion for sleep apnea, social alcohol use (none since hospital discharge).    - Continue 30 day event monitor   - Continue eliquis for 2 months post cardioversion    - DECREASE metoprolol tartrate to 12.5mg BID (due to bradycarida in clinic)   - Follow up with Dr. Barros in 1 month - review monitor results, patient wishing to discussing getting off cardiac medications at that time (eliquis and metoprolol).       2. L arm cephalic vein thrombus - noted while in the hospital, secondary to IV infiltration.         Changes today: DECREASE metoprolol tartrate to 25mg BID    Follow up plan:     Follow up with Dr. Barros in 1 month to review monitor results.         History of Presenting Illness:    Sanjeev Blackmon is a very pleasant 63 year old male with a recent history of atrial flutter/fibrillation in setting of sepsis. No prior cardiac history.     Patient hospitalized 3/19/19-3/28/19 presented with 1 day of dry heaves, epigastric pain and fever of 102. Patient was treated for sepsis. Gallbladder issue were ruled out. MRCP completed 3/26 showed some fluid and stranding with the body and tail of the pancrease, which could indicate pancreatitis. Plan for outpatient GI follow up. Developed atrial  "flutter/fibrillation with RVR (rate 170s)  in setting of sepsis, started on metoprolol to help with rate control, cardiology consulted and placedon amiodarone with no improvement, underwent ELI and cardioversion, stayed in normal sinus rhythm, advised eliquis 5mg BID x 2 month. Patient was asymptomatic. Low suspicion for sleep apnea. He only socially drinks. No family history of atrial arrhythmias or sudden death. Echocardiogram showed LVEF 50-55%, no regional wall motion abnormalities and no significant valvular disease.     Patient is here today for post hospital follow up.     Patient reports feeling good.Patient feeling listless on metoprolol, energy levels are lower, \"legs get tired\".   Patient denies chest pain or chest tightness. Denies shortness of breath or exertional dyspnea. Denies dizziness, lightheadedness or other presyncopal symptoms. Denies tachycardia or palpitations. Denies episodes of bleeding. Denies lower extremity edema. 30 day event monitor in place.     Blood pressure 124/78 and HR 56 in clinic today.    Following low fat diet. Appetite good. Eating most meals at home. Patient has a very active lifestyle. Walking daily. No alcohol since hospitalization, prior to that was socially. Lifelong nonsmoker.         Recent Hospitalizations   3/19/19-3/28/19 presented with 1 day of dry heaves, epigastric pain and fever of 102. Patient was treated for sepsis. Gallbladder issue or pancreatitis were ruled out. Developed atrial flutter/fibrillation with RVR (rate 170s)  in setting of sepsis        Social History    , 2 children, 6 grandchildren. Patient works part time for a lawn service during the summer months.   Social History     Socioeconomic History     Marital status:      Spouse name: Katrina     Number of children: 2     Years of education: Not on file     Highest education level: Not on file   Occupational History     Occupation: Retired     Employer: [employer]   Social Needs     " "Financial resource strain: Not on file     Food insecurity:     Worry: Not on file     Inability: Not on file     Transportation needs:     Medical: Not on file     Non-medical: Not on file   Tobacco Use     Smoking status: Never Smoker     Smokeless tobacco: Never Used   Substance and Sexual Activity     Alcohol use: Not Currently     Alcohol/week: 2.0 - 3.0 oz     Types: 4 - 6 Standard drinks or equivalent per week     Drug use: Not on file     Sexual activity: Yes     Partners: Female     Comment:  in 1976   Lifestyle     Physical activity:     Days per week: Not on file     Minutes per session: Not on file     Stress: Not on file   Relationships     Social connections:     Talks on phone: Not on file     Gets together: Not on file     Attends [employer] service: Not on file     Active member of club or organization: Not on file     Attends meetings of clubs or organizations: Not on file     Relationship status: Not on file     Intimate partner violence:     Fear of current or ex partner: Not on file     Emotionally abused: Not on file     Physically abused: Not on file     Forced sexual activity: Not on file   Other Topics Concern     Parent/sibling w/ CABG, MI or angioplasty before 65F 55M? Not Asked   Social History Narrative     Not on file            Review of Systems:   Skin:  Negative     Eyes:  Negative    ENT:  Negative    Respiratory:  Negative    Cardiovascular:  Negative    Gastroenterology: Negative    Genitourinary:  not assessed    Musculoskeletal:  Negative    Neurologic:  Negative    Psychiatric:  Negative    Heme/Lymph/Imm:  Negative    Endocrine:  Negative           Physical Exam:   Vitals: /78   Pulse 56   Ht 1.765 m (5' 9.5\")   Wt 84.8 kg (187 lb)   BMI 27.22 kg/m      Wt Readings from Last 4 Encounters:   04/08/19 84.8 kg (187 lb)   04/04/19 84.4 kg (186 lb)   03/28/19 90.9 kg (200 lb 4.8 oz)   02/27/15 86.7 kg (191 lb 3.2 oz)     GEN: well nourished, in no acute " distress.  HEENT:  Pupils equal, round. Sclerae nonicteric.   NECK: Supple, no masses appreciated. No JVD  C/V:  Regular rate and rhythm, no murmur, rub or gallop.    RESP: Respirations are unlabored. Clear to auscultation bilaterally without wheezing, rales, or rhonchi.  GI: Abdomen soft, nontender.  EXTREM: No LE edema.  NEURO: Alert and oriented, cooperative.  SKIN: Warm and dry.        Data:   ECHO 3/23/2019  The left ventricle is normal in size.  There is normal left ventricular wall thickness.  The visual ejection fraction is estimated at 50-55%.  Diastolic Doppler findings (E/E' ratio and/or other parameters) suggest left  ventricular filling pressures are normal.  No regional wall motion abnormalities noted.  No significant valvular heart disease.  The rhythm was atrial flutter.    ELI 3/25/2019  The visual ejection fraction is estimated at 40-45%.  No thrombus is detected in the left atrial appendage.      LIPID RESULTS:  Lab Results   Component Value Date    CHOL 233 (H) 02/27/2015    HDL 78 02/27/2015     (H) 02/27/2015    TRIG 66 02/27/2015     LIVER ENZYME RESULTS:  Lab Results   Component Value Date    AST 28 04/04/2019    ALT 29 04/04/2019     CBC RESULTS:  Lab Results   Component Value Date    WBC 7.5 04/04/2019    WBC 14.3 (H) 03/28/2019    RBC 4.72 04/04/2019    RBC 4.22 (L) 03/28/2019    HGB 13.5 04/04/2019    HCT 41.1 04/04/2019    MCV 87.0 04/04/2019    MCH 28.6 04/04/2019    MCHC 32.9 (A) 04/04/2019    RDW 14.3 03/28/2019     04/04/2019     BMP RESULTS:  Lab Results   Component Value Date     04/04/2019    POTASSIUM 4.8 04/04/2019    CHLORIDE 104 04/04/2019    CO2 22 03/28/2019    ANIONGAP 6 03/28/2019    GLC 82 04/04/2019    BUN 34 (H) 04/04/2019    BUN 28 (H) 04/04/2019    CR 1.22 04/04/2019    GFRESTIMATED 69 03/28/2019    GFRESTBLACK 80 03/28/2019    LEANNA 9.3 04/04/2019      A1C RESULTS:  No results found for: A1C  INR RESULTS:  Lab Results   Component Value Date    INR  1.39 (H) 03/21/2019            Medications     Current Outpatient Medications   Medication Sig Dispense Refill     apixaban ANTICOAGULANT (ELIQUIS) 5 MG tablet Take 1 tablet (5 mg) by mouth 2 times daily 60 tablet 1     metoprolol tartrate (LOPRESSOR) 50 MG tablet Take 0.5 tablets (25 mg) by mouth 2 times daily 60 tablet 3     pantoprazole (PROTONIX) 40 MG EC tablet Take 1 tablet (40 mg) by mouth every morning (before breakfast) 30 tablet 1          Past Medical History     Past Medical History:   Diagnosis Date     Acute Left Arm DVT -- Cephalic Vein 3/26/19 3/26/2019     BRANDEN (acute kidney injury) (H) 3/23/2019     Atrial flutter with rapid ventricular response (H) 3/22/2019     Gastroesophageal reflux disease with esophagitis 3/22/2019     Nasal polyps 12/17/2012     Sepsis 3/19/2019     Past Surgical History:   Procedure Laterality Date     ABLATE VEIN VARICOSE RADIO FREQUENCY WITH PHLEBECTOMY MULTIPLE STAB  2011    Dr. Do     ESOPHAGOSCOPY, GASTROSCOPY, DUODENOSCOPY (EGD), COMBINED N/A 3/22/2019    Procedure: COMBINED ESOPHAGOSCOPY, GASTROSCOPY, DUODENOSCOPY (EGD);  Surgeon: Yazmin Alvarez MD;  Location:  GI     Scleral lesion excision  2006     SINUS SURGERY  1979     SINUS SURGERY  1989     SINUS SURGERY  1999     SINUS SURGERY  2010    Dr. Samson     Family History   Problem Relation Age of Onset     Coronary Artery Disease Father             Allergies   Patient has no known allergies.        RODOLFO Winchester CNP  [facility]  Pager: [phone]          Thank you for allowing me to participate in the care of your patient.    Sincerely,     RODOLFO Winchester CNP     [facility]

## 2019-04-08 NOTE — RESULT ENCOUNTER NOTE
Dear Sanjeev,   I am writing to report that your included test results are slowly improving.  Timmy Calix M.D.

## 2019-04-08 NOTE — PROGRESS NOTES
Cardiology Clinic Progress Note  Sanjeev Blackmon MRN# [id number]   YOB: 1955 Age: 63 year old   Primary Cardiologist: Dr. Barros Reason for visit: post hospital follow up             Assessment and Plan:   Sanjeev Blackmon is a very pleasant 63 year old male with a recent history of atrial flutter/fibrillation in setting of sepsis. No prior cardiac history.     1.  Atrial flutter/fibrillation with RVR s/p cardioversion, in the setting of sepsis. No prior cardiac history. Low suspicion for sleep apnea, social alcohol use (none since hospital discharge).    - Continue 30 day event monitor   - Continue eliquis for 2 months post cardioversion    - DECREASE metoprolol tartrate to 12.5mg BID (due to bradycarida in clinic)   - Follow up with Dr. Barros in 1 month - review monitor results, patient wishing to discussing getting off cardiac medications at that time (eliquis and metoprolol).       2. L arm cephalic vein thrombus - noted while in the hospital, secondary to IV infiltration.         Changes today: DECREASE metoprolol tartrate to 25mg BID    Follow up plan:     Follow up with Dr. Barros in 1 month to review monitor results.         History of Presenting Illness:    Sanjeev Blackmon is a very pleasant 63 year old male with a recent history of atrial flutter/fibrillation in setting of sepsis. No prior cardiac history.     Patient hospitalized 3/19/19-3/28/19 presented with 1 day of dry heaves, epigastric pain and fever of 102. Patient was treated for sepsis. Gallbladder issue were ruled out. MRCP completed 3/26 showed some fluid and stranding with the body and tail of the pancrease, which could indicate pancreatitis. Plan for outpatient GI follow up. Developed atrial flutter/fibrillation with RVR (rate 170s)  in setting of sepsis, started on metoprolol to help with rate control, cardiology consulted and placedon amiodarone with no improvement, underwent ELI and cardioversion, stayed in normal sinus rhythm,  "advised eliquis 5mg BID x 2 month. Patient was asymptomatic. Low suspicion for sleep apnea. He only socially drinks. No family history of atrial arrhythmias or sudden death. Echocardiogram showed LVEF 50-55%, no regional wall motion abnormalities and no significant valvular disease.     Patient is here today for post hospital follow up.     Patient reports feeling good.Patient feeling listless on metoprolol, energy levels are lower, \"legs get tired\".   Patient denies chest pain or chest tightness. Denies shortness of breath or exertional dyspnea. Denies dizziness, lightheadedness or other presyncopal symptoms. Denies tachycardia or palpitations. Denies episodes of bleeding. Denies lower extremity edema. 30 day event monitor in place.     Blood pressure 124/78 and HR 56 in clinic today.    Following low fat diet. Appetite good. Eating most meals at home. Patient has a very active lifestyle. Walking daily. No alcohol since hospitalization, prior to that was socially. Lifelong nonsmoker.         Recent Hospitalizations   3/19/19-3/28/19 presented with 1 day of dry heaves, epigastric pain and fever of 102. Patient was treated for sepsis. Gallbladder issue or pancreatitis were ruled out. Developed atrial flutter/fibrillation with RVR (rate 170s)  in setting of sepsis        Social History    , 2 children, 6 grandchildren. Patient works part time for a lawn service during the summer months.   Social History     Socioeconomic History     Marital status:      Spouse name: Katrina     Number of children: 2     Years of education: Not on file     Highest education level: Not on file   Occupational History     Occupation: Retired     Employer: [employer]   Social Needs     Financial resource strain: Not on file     Food insecurity:     Worry: Not on file     Inability: Not on file     Transportation needs:     Medical: Not on file     Non-medical: Not on file   Tobacco Use     Smoking status: Never Smoker     " "Smokeless tobacco: Never Used   Substance and Sexual Activity     Alcohol use: Not Currently     Alcohol/week: 2.0 - 3.0 oz     Types: 4 - 6 Standard drinks or equivalent per week     Drug use: Not on file     Sexual activity: Yes     Partners: Female     Comment:  in 1976   Lifestyle     Physical activity:     Days per week: Not on file     Minutes per session: Not on file     Stress: Not on file   Relationships     Social connections:     Talks on phone: Not on file     Gets together: Not on file     Attends Church service: Not on file     Active member of club or organization: Not on file     Attends meetings of clubs or organizations: Not on file     Relationship status: Not on file     Intimate partner violence:     Fear of current or ex partner: Not on file     Emotionally abused: Not on file     Physically abused: Not on file     Forced sexual activity: Not on file   Other Topics Concern     Parent/sibling w/ CABG, MI or angioplasty before 65F 55M? Not Asked   Social History Narrative     Not on file            Review of Systems:   Skin:  Negative     Eyes:  Negative    ENT:  Negative    Respiratory:  Negative    Cardiovascular:  Negative    Gastroenterology: Negative    Genitourinary:  not assessed    Musculoskeletal:  Negative    Neurologic:  Negative    Psychiatric:  Negative    Heme/Lymph/Imm:  Negative    Endocrine:  Negative           Physical Exam:   Vitals: /78   Pulse 56   Ht 1.765 m (5' 9.5\")   Wt 84.8 kg (187 lb)   BMI 27.22 kg/m     Wt Readings from Last 4 Encounters:   04/08/19 84.8 kg (187 lb)   04/04/19 84.4 kg (186 lb)   03/28/19 90.9 kg (200 lb 4.8 oz)   02/27/15 86.7 kg (191 lb 3.2 oz)     GEN: well nourished, in no acute distress.  HEENT:  Pupils equal, round. Sclerae nonicteric.   NECK: Supple, no masses appreciated. No JVD  C/V:  Regular rate and rhythm, no murmur, rub or gallop.    RESP: Respirations are unlabored. Clear to auscultation bilaterally without wheezing, " rales, or rhonchi.  GI: Abdomen soft, nontender.  EXTREM: No LE edema.  NEURO: Alert and oriented, cooperative.  SKIN: Warm and dry.        Data:   ECHO 3/23/2019  The left ventricle is normal in size.  There is normal left ventricular wall thickness.  The visual ejection fraction is estimated at 50-55%.  Diastolic Doppler findings (E/E' ratio and/or other parameters) suggest left  ventricular filling pressures are normal.  No regional wall motion abnormalities noted.  No significant valvular heart disease.  The rhythm was atrial flutter.    ELI 3/25/2019  The visual ejection fraction is estimated at 40-45%.  No thrombus is detected in the left atrial appendage.      LIPID RESULTS:  Lab Results   Component Value Date    CHOL 233 (H) 02/27/2015    HDL 78 02/27/2015     (H) 02/27/2015    TRIG 66 02/27/2015     LIVER ENZYME RESULTS:  Lab Results   Component Value Date    AST 28 04/04/2019    ALT 29 04/04/2019     CBC RESULTS:  Lab Results   Component Value Date    WBC 7.5 04/04/2019    WBC 14.3 (H) 03/28/2019    RBC 4.72 04/04/2019    RBC 4.22 (L) 03/28/2019    HGB 13.5 04/04/2019    HCT 41.1 04/04/2019    MCV 87.0 04/04/2019    MCH 28.6 04/04/2019    MCHC 32.9 (A) 04/04/2019    RDW 14.3 03/28/2019     04/04/2019     BMP RESULTS:  Lab Results   Component Value Date     04/04/2019    POTASSIUM 4.8 04/04/2019    CHLORIDE 104 04/04/2019    CO2 22 03/28/2019    ANIONGAP 6 03/28/2019    GLC 82 04/04/2019    BUN 34 (H) 04/04/2019    BUN 28 (H) 04/04/2019    CR 1.22 04/04/2019    GFRESTIMATED 69 03/28/2019    GFRESTBLACK 80 03/28/2019    LEANNA 9.3 04/04/2019      A1C RESULTS:  No results found for: A1C  INR RESULTS:  Lab Results   Component Value Date    INR 1.39 (H) 03/21/2019            Medications     Current Outpatient Medications   Medication Sig Dispense Refill     apixaban ANTICOAGULANT (ELIQUIS) 5 MG tablet Take 1 tablet (5 mg) by mouth 2 times daily 60 tablet 1     metoprolol tartrate (LOPRESSOR) 50  MG tablet Take 0.5 tablets (25 mg) by mouth 2 times daily 60 tablet 3     pantoprazole (PROTONIX) 40 MG EC tablet Take 1 tablet (40 mg) by mouth every morning (before breakfast) 30 tablet 1          Past Medical History     Past Medical History:   Diagnosis Date     Acute Left Arm DVT -- Cephalic Vein 3/26/19 3/26/2019     BRANDEN (acute kidney injury) (H) 3/23/2019     Atrial flutter with rapid ventricular response (H) 3/22/2019     Gastroesophageal reflux disease with esophagitis 3/22/2019     Nasal polyps 12/17/2012     Sepsis 3/19/2019     Past Surgical History:   Procedure Laterality Date     ABLATE VEIN VARICOSE RADIO FREQUENCY WITH PHLEBECTOMY MULTIPLE STAB  2011    Dr. Do     ESOPHAGOSCOPY, GASTROSCOPY, DUODENOSCOPY (EGD), COMBINED N/A 3/22/2019    Procedure: COMBINED ESOPHAGOSCOPY, GASTROSCOPY, DUODENOSCOPY (EGD);  Surgeon: Yazmin Alvarez MD;  Location: [facility]     Scleral lesion excision  2006     SINUS SURGERY  1979     SINUS SURGERY  1989     SINUS SURGERY  1999     SINUS SURGERY  2010    Dr. Samson     Family History   Problem Relation Age of Onset     Coronary Artery Disease Father             Allergies   Patient has no known allergies.        RODOLFO [facility]  Pager: [phone]

## 2019-04-08 NOTE — PATIENT INSTRUCTIONS
1. DECREASE metoprolol to 25mg 2 x a day (1/2 tablet 2 x a day)  2. Continue eliquis 1 tablet 2 x a day  3. Follow up with Dr. Barros in 1 month.

## 2019-04-15 ENCOUNTER — OFFICE VISIT (OUTPATIENT)
Dept: FAMILY MEDICINE | Facility: CLINIC | Age: 64
End: 2019-04-15

## 2019-04-15 VITALS
SYSTOLIC BLOOD PRESSURE: 126 MMHG | HEART RATE: 60 BPM | WEIGHT: 186.2 LBS | RESPIRATION RATE: 16 BRPM | DIASTOLIC BLOOD PRESSURE: 82 MMHG | OXYGEN SATURATION: 99 % | BODY MASS INDEX: 27.1 KG/M2

## 2019-04-15 DIAGNOSIS — Z01.818 PREOP GENERAL PHYSICAL EXAM: Primary | ICD-10-CM

## 2019-04-15 DIAGNOSIS — K21.00 GASTROESOPHAGEAL REFLUX DISEASE WITH ESOPHAGITIS: ICD-10-CM

## 2019-04-15 DIAGNOSIS — I82.622 ARM DVT (DEEP VENOUS THROMBOEMBOLISM), ACUTE, LEFT (H): ICD-10-CM

## 2019-04-15 PROBLEM — J18.9 PNEUMONIA: Status: RESOLVED | Noted: 2019-03-23 | Resolved: 2019-04-15

## 2019-04-15 PROBLEM — A41.9 SEPSIS (H): Status: RESOLVED | Noted: 2019-03-19 | Resolved: 2019-04-15

## 2019-04-15 PROCEDURE — 99215 OFFICE O/P EST HI 40 MIN: CPT | Performed by: FAMILY MEDICINE

## 2019-04-15 NOTE — PROGRESS NOTES
[facility]  [address] [place] [id number] [phone]  Dept: [phone]    PRE-OP EVALUATION:  Today's date: 4/15/2019    Sanjeev Blackmon (: 1955) presents for pre-operative evaluation assessment as requested by , Yazmin Davis MD.  He requires evaluation and anesthesia risk assessment prior to undergoing surgery/procedure for treatment of COMBINED ENDOSCOPIC ULTRASOUND, ESOPHAGOSCOPY, GASTROSCOPY, DUODENOSCOPY (EGD) (MAC) .    Proposed Surgery/ Procedure: COMBINED ENDOSCOPIC ULTRASOUND, ESOPHAGOSCOPY, GASTROSCOPY, DUODENOSCOPY (EGD) (MAC)  Date of Surgery/ Procedure: 15/10/2019  Time of Surgery/ Procedure: 1430  Hospital/Surgical Facility: [facility]   Fax number for surgical facility:   Primary Physician: Timmy Calix  Type of Anesthesia Anticipated: to be determined    Patient has a Health Care Directive or Living Will:  NO    1. NO - Do you have a history of heart attack, stroke, stent, bypass or surgery on an artery in the head, neck, heart or legs?  2. NO - Do you ever have any pain or discomfort in your chest?  3. NO - Do you have a history of  Heart Failure?  4. NO - Are you troubled by shortness of breath when: walking on the level, up a slight hill or at night?  5. NO - Do you currently have a cold, bronchitis or other respiratory infection?  6. NO - Do you have a cough, shortness of breath or wheezing?  7. NO - Do you sometimes get pains in the calves of your legs when you walk?  8. NO - Do you or anyone in your family have previous history of blood clots?  9. NO - Do you or does anyone in your family have a serious bleeding problem such as prolonged bleeding following surgeries or cuts?  10. NO - Have you ever had problems with anemia or been told to take iron pills?  11. NO - Have you had any abnormal blood loss such as black, tarry or bloody stools, or abnormal vaginal bleeding?  12. NO - Have you ever had a blood transfusion?  13. NO - Have you  or any of your relatives ever had problems with anesthesia?  14. NO - Do you have sleep apnea, excessive snoring or daytime drowsiness?  15. NO - Do you have any prosthetic heart valves?  16. NO - Do you have prosthetic joints?  17. NO - Is there any chance that you may be pregnant?      HPI:     HPI related to upcoming procedure:   Patient hospitalized 3/19/19-3/28/19 presented with 1 day of dry heaves, epigastric pain and fever of 102. Patient was treated for sepsis. Gallbladder issue were ruled out. MRCP completed 3/26 showed some fluid and stranding with the body and tail of the pancrease, which could indicate pancreatitis. He only socially drinks.    During last hospitalization he See problem list for active medical problems.  Problems all longstanding and stable, except as noted/documented.  See ROS for pertinent symptoms related to these conditions.                                                                                                                                                          .    MEDICAL HISTORY:     Patient Active Problem List    Diagnosis Date Noted     Acute Left Arm DVT -- Cephalic Vein 3/26/19 03/26/2019     Priority: Medium     BRANDEN (acute kidney injury) (H) 03/23/2019     Priority: Medium     Thrombocytopenia (H) 03/23/2019     Priority: Medium     Atrial flutter with rapid ventricular response (H) 03/22/2019     Priority: Medium     Gastroesophageal reflux disease with esophagitis 03/22/2019     Priority: Medium     Nasal polyps 12/17/2012     Priority: Medium     IMO update changed this record. Please review for accuracy       Advanced directives, counseling/discussion 12/17/2012     Priority: Medium     As is reasonable care for Most of us, wants in the Short term aggressive care.   No desire for long term prolongation of life through artificial means if no hope to bring back to a reasonable status.           Past Medical History:   Diagnosis Date     Acute Left Arm DVT --  Cephalic Vein 3/26/19 3/26/2019     BRANDEN (acute kidney injury) (H) 3/23/2019     Atrial flutter with rapid ventricular response (H) 3/22/2019     Gastroesophageal reflux disease with esophagitis 3/22/2019     Nasal polyps 12/17/2012     Sepsis 3/19/2019     Past Surgical History:   Procedure Laterality Date     ABLATE VEIN VARICOSE RADIO FREQUENCY WITH PHLEBECTOMY MULTIPLE STAB  2011    Dr. Do     ESOPHAGOSCOPY, GASTROSCOPY, DUODENOSCOPY (EGD), COMBINED N/A 3/22/2019    Procedure: COMBINED ESOPHAGOSCOPY, GASTROSCOPY, DUODENOSCOPY (EGD);  Surgeon: Yazmin Alvarez MD;  Location: [facility]     Scleral lesion excision  2006     SINUS SURGERY  1979     SINUS SURGERY  1989     SINUS SURGERY  1999     SINUS SURGERY  2010    Dr. Samson     Current Outpatient Medications   Medication Sig Dispense Refill     apixaban ANTICOAGULANT (ELIQUIS) 5 MG tablet Take 1 tablet (5 mg) by mouth 2 times daily 60 tablet 1     metoprolol tartrate (LOPRESSOR) 50 MG tablet Take 0.5 tablets (25 mg) by mouth 2 times daily 60 tablet 3     pantoprazole (PROTONIX) 40 MG EC tablet Take 1 tablet (40 mg) by mouth every morning (before breakfast) 30 tablet 1     OTC products: None, except as noted above    No Known Allergies   Latex Allergy: NO    Social History     Tobacco Use     Smoking status: Never Smoker     Smokeless tobacco: Never Used   Substance Use Topics     Alcohol use: Not Currently     Alcohol/week: 2.0 - 3.0 oz     Types: 4 - 6 Standard drinks or equivalent per week     History   Drug Use Not on file       REVIEW OF SYSTEMS:   Constitutional, neuro, ENT, endocrine, pulmonary, cardiac, gastrointestinal, genitourinary, musculoskeletal, integument and psychiatric systems are negative, except as otherwise noted.    EXAM:   /82   Pulse 60   Resp 16   Wt 84.5 kg (186 lb 3.2 oz)   SpO2 99%   BMI 27.10 kg/m      GENERAL APPEARANCE: healthy, alert and no distress     EYES: EOMI,  PERRL     HENT: ear canals and TM's normal and  nose and mouth without ulcers or lesions     NECK: no adenopathy, no asymmetry, masses, or scars and thyroid normal to palpation     RESP: lungs clear to auscultation - no rales, rhonchi or wheezes     CV: regular rates and rhythm, normal S1 S2, no S3 or S4 and no murmur, click or rub     ABDOMEN:  soft, nontender, no HSM or masses and bowel sounds normal     MS: extremities normal- no gross deformities noted, no evidence of inflammation in joints, FROM in all extremities.     SKIN: no suspicious lesions or rashes     NEURO: Normal strength and tone, sensory exam grossly normal, mentation intact and speech normal     PSYCH: mentation appears normal. and affect normal/bright     LYMPHATICS: No cervical adenopathy    DIAGNOSTICS:     EKG: Not indicated due to non-vascular surgery and last ekg on 3/21/2019 (within 30 days for CAD history or last year for cardiac risk factors)  Labs Resulted Today:   Results for orders placed or performed in visit on 04/04/19   CBC with Diff/Plt ([facility])   Result Value Ref Range    WBC x10/cmm 7.5 3.8 - 11.0 x10/cmm    % Lymphocytes 28.8 20.5 - 51.1 %    % Monocytes 9.2 1.7 - 9.3 %    % Granulocytes 62.0 42.2 - 75.2 %    RBC x10/cmm 4.72 4.2 - 5.9 x10/cmm    Hemoglobin 13.5 13.4 - 17.5 g/dl    Hematocrit 41.1 39 - 51 %    MCV 87.0 80 - 100 fL    MCH 28.6 27.0 - 31.0 pg    MCHC 32.9 (A) 33.0 - 37.0 g/dL    Platelet Count 362 140 - 450 K/uL   Comp. Metabolic Panel (14) ([facility])   Result Value Ref Range    Glucose 82 65 - 99 mg/dL    Urea Nitrogen 34 (H) 8 - 27 mg/dL    Creatinine 1.22 0.76 - 1.27 mg/dL    eGFR If NonAfricn Am 62 >59 mL/min/1.73    eGFR If Africn Am 71 >59 mL/min/1.73    BUN/Creatinine Ratio 28 (H) 10 - 24    Sodium 138 134 - 144 mmol/L    Potassium 4.8 3.5 - 5.2 mmol/L    Chloride 104 96 - 106 mmol/L    Total CO2 21 20 - 29 mmol/L    Calcium 9.3 8.6 - 10.2 mg/dL    Protein Total 8.7 (H) 6.0 - 8.5 g/dL    Albumin 4.0 3.6 - 4.8 g/dL    Globulin, Total 4.7 (H) 1.5 - 4.5 g/dL     A/G Ratio 0.9 (L) 1.2 - 2.2    Bilirubin Total 0.6 0.0 - 1.2 mg/dL    Alkaline Phosphatase 107 39 - 117 IU/L    AST 28 0 - 40 IU/L    ALT 29 0 - 44 IU/L    Narrative    Performed at:  01 - [facility] [address], [place], [place]  [id number]  : Alex Aguirre MD, Phone:  [phone]   Lipase  Serum ([facility])   Result Value Ref Range    Lipase 542 (H) 13 - 78 U/L    Narrative    Performed at:  01 - [facility] [address], [place], [place]  [id number]  : Alex Aguirre MD, Phone:  [phone]   Amylase  Serum ([facility])   Result Value Ref Range    Amylase 194 (H) 31 - 124 U/L    Narrative    Performed at:  01 - [facility] [address], [place], [place]  [id number]  : Alex Aguirre MD, Phone:  [phone]       Recent Labs   Lab Test 04/04/19  1315 04/04/19 03/28/19  0811  03/21/19  0143   HGB  --  13.5 12.3*   < >  --    PLT  --  362 313   < >  --    INR  --   --   --   --  1.39*     --  134   < > 142   POTASSIUM 4.8  --  3.7   < > 4.1   CR 1.22  --  1.11   < > 1.50*    < > = values in this interval not displayed.        IMPRESSION:   Reason for surgery/procedure: Pancreatic stranding    Recent atrial flutter now on elequis and in NSR and wearing an event monitor.    The proposed surgical procedure is considered LOW risk.    REVISED CARDIAC RISK INDEX  The patient has the following serious cardiovascular risks for perioperative complications such as (MI, PE, VFib and 3  AV Block):  No serious cardiac risks  INTERPRETATION: 1 risks: Class II (low risk - 0.9% complication rate)    The patient has the following additional risks for perioperative complications:  No identified additional risks      ICD-10-CM    1. Preop general physical exam Z01.818    2. Gastroesophageal reflux disease with esophagitis K21.0    3. Acute Left Arm DVT -- Cephalic Vein 3/26/19 I82.622        RECOMMENDATIONS:         --Patient is to take all scheduled medications on the day of surgery  EXCEPT for modifications listed below.    APPROVAL GIVEN to proceed with proposed procedure, without further diagnostic evaluation       Signed Electronically by: Timmy Calix MD    Copy of this evaluation report is provided to requesting physician.    [facility] Preop Guidelines    Revised Cardiac Risk Index

## 2019-04-18 ENCOUNTER — TRANSFERRED RECORDS (OUTPATIENT)
Dept: FAMILY MEDICINE | Facility: CLINIC | Age: 64
End: 2019-04-18

## 2019-05-03 ENCOUNTER — OFFICE VISIT (OUTPATIENT)
Dept: CARDIOLOGY | Facility: CLINIC | Age: 64
End: 2019-05-03
Attending: NURSE PRACTITIONER
Payer: COMMERCIAL

## 2019-05-03 VITALS
HEART RATE: 58 BPM | HEIGHT: 70 IN | OXYGEN SATURATION: 100 % | DIASTOLIC BLOOD PRESSURE: 74 MMHG | BODY MASS INDEX: 26.48 KG/M2 | SYSTOLIC BLOOD PRESSURE: 100 MMHG | WEIGHT: 185 LBS

## 2019-05-03 DIAGNOSIS — I48.0 PAROXYSMAL ATRIAL FIBRILLATION (H): Primary | ICD-10-CM

## 2019-05-03 PROCEDURE — 99213 OFFICE O/P EST LOW 20 MIN: CPT | Performed by: INTERNAL MEDICINE

## 2019-05-03 ASSESSMENT — MIFFLIN-ST. JEOR: SCORE: 1622.46

## 2019-05-03 NOTE — LETTER
5/3/2019      Timmy Calix MD  [address]  [facility] [address]      RE: Sanjeev Blackmon       Dear Colleague,    I had the pleasure of seeing Sanjeev Blackmon in the [facility].    Service Date: 05/03/2019      LOCATION:  [facility].      PRIMARY CARE AND REFERRING PROVIDER:  Dr. Timmy Calix.      REASON FOR VISIT:   1.  Post-hospitalization followup for atrial fibrillation with rapid ventricular rates with successful cardioversion.      HISTORY OF PRESENT ILLNESS:  Sanjeev Blackmon was unaccompanied today.  He is known to me from a recent inpatient cardiology consultation dated 03/21/2019, when he was admitted with abdominal discomfort and fever suggestive of sepsis of unknown source.  His GI workup was largely unremarkable apart from some mild esophagitis.  He was managed conservatively.  But in this context, he developed atrial fibrillation with rapid ventricular rates up to 170 BPM which was a new diagnosis for him.  He had no symptoms during this.  His serial troponins were borderline elevated at 0.039, 0.042 without significant delta.  V/Q scan ruled out pulmonary embolus.  His echocardiogram showed normal LVEF of 50%-55% without significant valve disease.  His IV amiodarone was unsuccessful in cardioverting him.  He had a successful ELI-guided electrical cardioversion on 03/25/2018 with a single 200-joule synchronized shock.  He was placed on Eliquis 5 mg b.i.d., which he continues.  CHADS-VASc score is 0 and Eliquis started due to post-cardioversion.      The patient has been discharged home.  He is doing well.  Prior to this admission, he was on no medications, rarely drank alcohol, no significant illnesses, nontobacco user.  He is physically very active and has been doing commercial lawn mowing, carrying a backpack that weighs about 20 pounds.      His 30-day event monitor shows sinus rhythm (all strips not yet been  transmitted).      PHYSICAL EXAMINATION:    VITAL SIGNS:  /74, pulse 58 per minute, BMI 26.9.   Physical exam is entirely exam is unremarkable.  He is in sinus rhythm.  No murmurs.  He appears younger than his stated age and has good muscle mass.      DIAGNOSIS:   1.  Single episode of paroxysmal atrial fibrillation with rapid ventricular rates during sepsis from presumed GI source (2018).  Successfully electrically cardioverted.  CHADS-VASc score 0.  Asymptomatic.  Event monitor unremarkable.      PLAN:    1.  Stop low-dose metoprolol.   2.  He has completed 30 days of Eliquis and just renewed his prescription.  Recommended that after he finishes the current prescriptions, he can discontinue Eliquis.   3.  Follow up with Cardiology as needed.      cc:   Timmy Calix MD    [facility] [address] [place]  [address]         DARLENE BARROS MD             D: 2019   T: 2019   MT: HENRY      Name:     TIM ROCK   MRN:      [id number]        Account:      [id number]   :      1955           Service Date: 2019      Document: [id number]         Outpatient Encounter Medications as of 5/3/2019   Medication Sig Dispense Refill     apixaban ANTICOAGULANT (ELIQUIS) 5 MG tablet Take 1 tablet (5 mg) by mouth 2 times daily 60 tablet 1     [DISCONTINUED] metoprolol tartrate (LOPRESSOR) 50 MG tablet Take 0.5 tablets (25 mg) by mouth 2 times daily 60 tablet 3     [DISCONTINUED] pantoprazole (PROTONIX) 40 MG EC tablet Take 1 tablet (40 mg) by mouth every morning (before breakfast) (Patient not taking: Reported on 5/3/2019) 30 tablet 1     No facility-administered encounter medications on file as of 5/3/2019.        Again, thank you for allowing me to participate in the care of your patient.      Sincerely,    Darlene Barros MD     [facility]

## 2019-05-03 NOTE — PATIENT INSTRUCTIONS
MEDICATION CHANGES:  1.  Stop the metoprolol medication.  2.  Stop the Eliquis medication after you run out of the next 30-day prescription.    FOLLOW-UP:  1.  You do not require regular follow-up with cardiology.  We will see you on an as-needed basis.    If you have any questions or concerns, please contact my nurses at [phone].

## 2019-05-03 NOTE — PROGRESS NOTES
Clinic visit note dictated. Dictation reference number - [id number]      REVIEW OF SYSTEMS:  A comprehensive 10-point review of systems was completed and the pertinent positives are documented in the history of present illness.    Skin:  Negative     Eyes:  Negative    ENT:  Negative    Respiratory:  Negative    Cardiovascular:  Negative    Gastroenterology: Negative    Genitourinary:  not assessed    Musculoskeletal:  Negative    Neurologic:  Negative    Psychiatric:  Negative    Heme/Lymph/Imm:  Negative    Endocrine:  Negative      CURRENT MEDICATIONS:  Current Outpatient Medications   Medication Sig Dispense Refill     apixaban ANTICOAGULANT (ELIQUIS) 5 MG tablet Take 1 tablet (5 mg) by mouth 2 times daily 60 tablet 1     metoprolol tartrate (LOPRESSOR) 50 MG tablet Take 0.5 tablets (25 mg) by mouth 2 times daily 60 tablet 3     pantoprazole (PROTONIX) 40 MG EC tablet Take 1 tablet (40 mg) by mouth every morning (before breakfast) (Patient not taking: Reported on 5/3/2019) 30 tablet 1         ALLERGIES:  No Known Allergies    PAST MEDICAL HISTORY:    Past Medical History:   Diagnosis Date     Acute Left Arm DVT -- Cephalic Vein 3/26/19 3/26/2019     BRANDEN (acute kidney injury) (H) 3/23/2019     Atrial flutter with rapid ventricular response (H) 3/22/2019     Gastroesophageal reflux disease with esophagitis 3/22/2019     Nasal polyps 12/17/2012     Sepsis 3/19/2019       PAST SURGICAL HISTORY:    Past Surgical History:   Procedure Laterality Date     ABLATE VEIN VARICOSE RADIO FREQUENCY WITH PHLEBECTOMY MULTIPLE STAB  2011    Dr. Do     ESOPHAGOSCOPY, GASTROSCOPY, DUODENOSCOPY (EGD), COMBINED N/A 3/22/2019    Procedure: COMBINED ESOPHAGOSCOPY, GASTROSCOPY, DUODENOSCOPY (EGD);  Surgeon: Yazmin Alvarez MD;  Location:  GI     Scleral lesion excision  2006     SINUS SURGERY  1979     SINUS SURGERY  1989     SINUS SURGERY  1999     SINUS SURGERY  2010    Dr. Samson       FAMILY HISTORY:    Family History  "  Problem Relation Age of Onset     Coronary Artery Disease Father        SOCIAL HISTORY:    Social History     Socioeconomic History     Marital status:      Spouse name: Katrina     Number of children: 2     Years of education: None     Highest education level: None   Occupational History     Occupation: Retired     Employer: [employer]   Social Needs     Financial resource strain: None     Food insecurity:     Worry: None     Inability: None     Transportation needs:     Medical: None     Non-medical: None   Tobacco Use     Smoking status: Never Smoker     Smokeless tobacco: Never Used   Substance and Sexual Activity     Alcohol use: Not Currently     Alcohol/week: 2.0 - 3.0 oz     Types: 4 - 6 Standard drinks or equivalent per week     Drug use: None     Sexual activity: Yes     Partners: Female     Comment:  in 1976   Lifestyle     Physical activity:     Days per week: None     Minutes per session: None     Stress: None   Relationships     Social connections:     Talks on phone: None     Gets together: None     Attends Orthodox service: None     Active member of club or organization: None     Attends meetings of clubs or organizations: None     Relationship status: None     Intimate partner violence:     Fear of current or ex partner: None     Emotionally abused: None     Physically abused: None     Forced sexual activity: None   Other Topics Concern     Parent/sibling w/ CABG, MI or angioplasty before 65F 55M? Not Asked   Social History Narrative     None       Vitals: /74   Pulse 58   Ht 1.765 m (5' 9.5\")   Wt 83.9 kg (185 lb)   SpO2 100%   BMI 26.93 kg/m    Wt Readings from Last 5 Encounters:   05/03/19 83.9 kg (185 lb)   04/15/19 84.5 kg (186 lb 3.2 oz)   04/08/19 84.8 kg (187 lb)   04/04/19 84.4 kg (186 lb)   03/28/19 90.9 kg (200 lb 4.8 oz)       Constitutional: Comfortable at rest. Cooperative, alert and oriented, well developed, well nourished.  Eyes: Pupils equal and round, " conjunctivae and lids unremarkable, sclera white, no xanthalasma,   Neck: Carotid pulses are full and equal bilaterally, no carotid bruit, no thyromegaly     Respiratory: Normal respiratory effort with symmetrical chest wall movements and no use of accessory muscles. Good air entry with normal breath sounds and no rales or wheeze.  Cardiovascular: Normal jugular venous pulse and pressure.  Normal carotid pulse character and volume.  No carotid bruit.  Apical impulse is undisplaced and normal to palpation without parasternal heave or thrill.  Heart sounds are normal and regular. No audible murmur. No S3, S4 or friction rub.    Skin: No rash, erythema, ecchymosis.  Musculoskeletal: Normal muscle tone and strength. Normal gait. No spinal deformities.  Neuropsychiatric: Oriented to time place and person.  Affect normal.  No gross motor deficits.  Extremities: No edema. No clubbing or deformities.

## 2019-05-03 NOTE — LETTER
5/3/2019    Timmy Calix MD  [address] [facility] [facility] [address]    RE: Sanjeev Blackmon       Dear Colleague,    I had the pleasure of seeing Sanjeev Blackmon in the [facility].    Clinic visit note dictated. Dictation reference number - [id number]      REVIEW OF SYSTEMS:  A comprehensive 10-point review of systems was completed and the pertinent positives are documented in the history of present illness.    Skin:  Negative     Eyes:  Negative    ENT:  Negative    Respiratory:  Negative    Cardiovascular:  Negative    Gastroenterology: Negative    Genitourinary:  not assessed    Musculoskeletal:  Negative    Neurologic:  Negative    Psychiatric:  Negative    Heme/Lymph/Imm:  Negative    Endocrine:  Negative      CURRENT MEDICATIONS:  Current Outpatient Medications   Medication Sig Dispense Refill     apixaban ANTICOAGULANT (ELIQUIS) 5 MG tablet Take 1 tablet (5 mg) by mouth 2 times daily 60 tablet 1     metoprolol tartrate (LOPRESSOR) 50 MG tablet Take 0.5 tablets (25 mg) by mouth 2 times daily 60 tablet 3     pantoprazole (PROTONIX) 40 MG EC tablet Take 1 tablet (40 mg) by mouth every morning (before breakfast) (Patient not taking: Reported on 5/3/2019) 30 tablet 1         ALLERGIES:  No Known Allergies    PAST MEDICAL HISTORY:    Past Medical History:   Diagnosis Date     Acute Left Arm DVT -- Cephalic Vein 3/26/19 3/26/2019     BRANDEN (acute kidney injury) (H) 3/23/2019     Atrial flutter with rapid ventricular response (H) 3/22/2019     Gastroesophageal reflux disease with esophagitis 3/22/2019     Nasal polyps 12/17/2012     Sepsis 3/19/2019       PAST SURGICAL HISTORY:    Past Surgical History:   Procedure Laterality Date     ABLATE VEIN VARICOSE RADIO FREQUENCY WITH PHLEBECTOMY MULTIPLE STAB  2011    Dr. Do     ESOPHAGOSCOPY, GASTROSCOPY, DUODENOSCOPY (EGD), COMBINED N/A 3/22/2019    Procedure: COMBINED ESOPHAGOSCOPY, GASTROSCOPY, DUODENOSCOPY (EGD);  Surgeon:  "Yazmin Alvarez MD;  Location:  GI     Scleral lesion excision  2006     SINUS SURGERY  1979     SINUS SURGERY  1989     SINUS SURGERY  1999     SINUS SURGERY  2010    Dr. Samson       FAMILY HISTORY:    Family History   Problem Relation Age of Onset     Coronary Artery Disease Father        SOCIAL HISTORY:    Social History     Socioeconomic History     Marital status:      Spouse name: Katrina     Number of children: 2     Years of education: None     Highest education level: None   Occupational History     Occupation: Retired     Employer: [employer]   Social Needs     Financial resource strain: None     Food insecurity:     Worry: None     Inability: None     Transportation needs:     Medical: None     Non-medical: None   Tobacco Use     Smoking status: Never Smoker     Smokeless tobacco: Never Used   Substance and Sexual Activity     Alcohol use: Not Currently     Alcohol/week: 2.0 - 3.0 oz     Types: 4 - 6 Standard drinks or equivalent per week     Drug use: None     Sexual activity: Yes     Partners: Female     Comment:  in 1976   Lifestyle     Physical activity:     Days per week: None     Minutes per session: None     Stress: None   Relationships     Social connections:     Talks on phone: None     Gets together: None     Attends Roman Catholic service: None     Active member of club or organization: None     Attends meetings of clubs or organizations: None     Relationship status: None     Intimate partner violence:     Fear of current or ex partner: None     Emotionally abused: None     Physically abused: None     Forced sexual activity: None   Other Topics Concern     Parent/sibling w/ CABG, MI or angioplasty before 65F 55M? Not Asked   Social History Narrative     None       Vitals: /74   Pulse 58   Ht 1.765 m (5' 9.5\")   Wt 83.9 kg (185 lb)   SpO2 100%   BMI 26.93 kg/m     Wt Readings from Last 5 Encounters:   05/03/19 83.9 kg (185 lb)   04/15/19 84.5 kg (186 lb 3.2 oz) "   04/08/19 84.8 kg (187 lb)   04/04/19 84.4 kg (186 lb)   03/28/19 90.9 kg (200 lb 4.8 oz)       Constitutional: Comfortable at rest. Cooperative, alert and oriented, well developed, well nourished.  Eyes: Pupils equal and round, conjunctivae and lids unremarkable, sclera white, no xanthalasma,   Neck: Carotid pulses are full and equal bilaterally, no carotid bruit, no thyromegaly     Respiratory: Normal respiratory effort with symmetrical chest wall movements and no use of accessory muscles. Good air entry with normal breath sounds and no rales or wheeze.  Cardiovascular: Normal jugular venous pulse and pressure.  Normal carotid pulse character and volume.  No carotid bruit.  Apical impulse is undisplaced and normal to palpation without parasternal heave or thrill.  Heart sounds are normal and regular. No audible murmur. No S3, S4 or friction rub.    Skin: No rash, erythema, ecchymosis.  Musculoskeletal: Normal muscle tone and strength. Normal gait. No spinal deformities.  Neuropsychiatric: Oriented to time place and person.  Affect normal.  No gross motor deficits.  Extremities: No edema. No clubbing or deformities.      Thank you for allowing me to participate in the care of your patient.      Sincerely,     Anita Barros MD     [facility]    cc:   Jenniffer Chen, RODOLFO CNP  [address]  [place] [address]

## 2019-05-03 NOTE — PROGRESS NOTES
Service Date: 05/03/2019      LOCATION:  [facility], [facility].      PRIMARY CARE AND REFERRING PROVIDER:  Dr. Timmy Calix.      REASON FOR VISIT:   1.  Post-hospitalization followup for atrial fibrillation with rapid ventricular rates with successful cardioversion.      HISTORY OF PRESENT ILLNESS:    Sanjeev Blackmon was unaccompanied today.  He is known to me from a recent inpatient cardiology consultation dated 03/21/2019, when he was admitted with abdominal discomfort and fever suggestive of sepsis of unknown source.  His GI workup was largely unremarkable apart from some mild esophagitis.  He was managed conservatively.  But in this context, he developed atrial fibrillation with rapid ventricular rates up to 170 BPM which was a new diagnosis for him.  He had no symptoms during this.  His serial troponins were borderline elevated at 0.039, 0.042 without significant delta.  V/Q scan ruled out pulmonary embolus.  His echocardiogram showed normal LVEF of 50%-55% without significant valve disease.   IV amiodarone was unsuccessful in cardioverting him.  He had a successful ELI-guided electrical cardioversion on 03/25/2018 with a single 200-joule synchronized shock.  He was placed on Eliquis 5 mg b.i.d., which he continues.  CHADS-VASc score is 0 and Eliquis started due to post-cardioversion.      The patient is doing well.  Prior to this admission, he was on no medications, rarely drank alcohol, no significant illnesses, nontobacco user.  He is physically very active and has been doing commercial lawn mowing, carrying a backpack that weighs about 20 pounds.      His 30-day event monitor shows sinus rhythm (all strips not yet been transmitted).     VITAL SIGNS:  /74, pulse 58 per minute, BMI 26.9.   Physical exam is entirely exam is unremarkable.  He is in sinus rhythm.  No murmurs.  He appears younger than his stated age and has good muscle mass.      DIAGNOSIS:   1.  Single episode of  paroxysmal atrial fibrillation with rapid ventricular rates during sepsis from presumed GI source (2018).  Successfully electrically cardioverted.  CHADS-VASc score 0.  Asymptomatic.  Event monitor unremarkable.      PLAN:    1.  Stop low-dose metoprolol.   2.  He has completed 30 days of Eliquis and just renewed his prescription.  Recommended that after he finishes the current prescriptions, he can discontinue Eliquis.   3.  Follow up with Cardiology as needed.      cc:   Timmy Calix MD    [facility] [address] [place] [address]         [facility] XU LOUISE MD             D: 2019   T: 2019   MT: HENRY      Name:     TIM ROCK   MRN:      [id number]        Account:      [id number]   :      1955           Service Date: 2019      Document: [id number]

## 2019-05-16 ENCOUNTER — OFFICE VISIT (OUTPATIENT)
Dept: FAMILY MEDICINE | Facility: CLINIC | Age: 64
End: 2019-05-16

## 2019-05-16 VITALS
BODY MASS INDEX: 26.75 KG/M2 | DIASTOLIC BLOOD PRESSURE: 84 MMHG | OXYGEN SATURATION: 97 % | RESPIRATION RATE: 16 BRPM | HEART RATE: 74 BPM | SYSTOLIC BLOOD PRESSURE: 126 MMHG | WEIGHT: 183.8 LBS

## 2019-05-16 DIAGNOSIS — Z01.818 PREOP GENERAL PHYSICAL EXAM: Primary | ICD-10-CM

## 2019-05-16 DIAGNOSIS — K85.90 ACUTE PANCREATITIS, UNSPECIFIED COMPLICATION STATUS, UNSPECIFIED PANCREATITIS TYPE: ICD-10-CM

## 2019-05-16 PROBLEM — I48.92 ATRIAL FLUTTER WITH RAPID VENTRICULAR RESPONSE (H): Status: RESOLVED | Noted: 2019-03-22 | Resolved: 2019-05-16

## 2019-05-16 PROBLEM — D69.6 THROMBOCYTOPENIA (H): Status: RESOLVED | Noted: 2019-03-23 | Resolved: 2019-05-16

## 2019-05-16 PROBLEM — N17.9 AKI (ACUTE KIDNEY INJURY) (H): Status: RESOLVED | Noted: 2019-03-23 | Resolved: 2019-05-16

## 2019-05-16 PROCEDURE — 99214 OFFICE O/P EST MOD 30 MIN: CPT | Performed by: FAMILY MEDICINE

## 2019-05-16 NOTE — PROGRESS NOTES
[facility]  [address] [place] [id number] [phone]  Dept: [phone]    PRE-OP EVALUATION:  Today's date: 2019    Sanjeev Blackmon (: 1955) presents for pre-operative evaluation assessment as requested by Yazmin Coe MD. He requires evaluation and anesthesia risk assessment prior to undergoing surgery/procedure for treatment of  COMBINED ENDOSCOPIC ULTRASOUND, ESOPHAGOSCOPY, GASTROSCOPY, DUODENOSCOPY (EGD) (MAC) .    Proposed Surgery/ Procedure: COMBINED ENDOSCOPIC ULTRASOUND, ESOPHAGOSCOPY, GASTROSCOPY, DUODENOSCOPY (EGD) (MAC)  Date of Surgery/ Procedure:  2019  Time of Surgery/ Procedure: [facility]  Hospital/Surgical Facility: [facility]  Primary Physician: Timmy Calix  Type of Anesthesia Anticipated: to be determined    Patient has a Health Care Directive or Living Will:  NO    1. NO - Do you have a history of heart attack, stroke, stent, bypass or surgery on an artery in the head, neck, heart or legs?  2. NO - Do you ever have any pain or discomfort in your chest?  3. NO - Do you have a history of  Heart Failure?  4. NO - Are you troubled by shortness of breath when: walking on the level, up a slight hill or at night?  5. NO - Do you currently have a cold, bronchitis or other respiratory infection?  6. NO - Do you have a cough, shortness of breath or wheezing?  7. NO - Do you sometimes get pains in the calves of your legs when you walk?  8. YES- Do you or anyone in your family have previous history of blood clots?  L arm clot after IV access, s/p 2 months on eliquis  9. NO - Do you or does anyone in your family have a serious bleeding problem such as prolonged bleeding following surgeries or cuts?  10. NO - Have you ever had problems with anemia or been told to take iron pills?  11. NO - Have you had any abnormal blood loss such as black, tarry or bloody stools, or abnormal vaginal bleeding?  12. NO - Have you ever had a blood  transfusion?  13. NO - Have you or any of your relatives ever had problems with anesthesia?  14. NO - Do you have sleep apnea, excessive snoring or daytime drowsiness?  15. NO - Do you have any prosthetic heart valves?  16. NO - Do you have prosthetic joints?  17. NO - Is there any chance that you may be pregnant?      HPI:     HPI related to upcoming procedure: recent bout of pancreatitis and is having follow up endoscopy and EUS.   Feeling well, no pain, and eating full diet.         See problem list for active medical problems.  Problems all longstanding and stable, except as noted/documented.  See ROS for pertinent symptoms related to these conditions.                                                                                                                       .    MEDICAL HISTORY:     Patient Active Problem List    Diagnosis Date Noted     Acute Left Arm DVT -- Cephalic Vein 3/26/19 03/26/2019     Priority: Medium     Gastroesophageal reflux disease with esophagitis 03/22/2019     Priority: Medium     Nasal polyps 12/17/2012     Priority: Medium     IMO update changed this record. Please review for accuracy       Advanced directives, counseling/discussion 12/17/2012     Priority: Medium     As is reasonable care for Most of us, wants in the Short term aggressive care.   No desire for long term prolongation of life through artificial means if no hope to bring back to a reasonable status.           Past Medical History:   Diagnosis Date     Acute Left Arm DVT -- Cephalic Vein 3/26/19 3/26/2019     BRANDEN (acute kidney injury) (H) 3/23/2019     Atrial flutter with rapid ventricular response (H) 3/22/2019     Gastroesophageal reflux disease with esophagitis 3/22/2019     Nasal polyps 12/17/2012     Sepsis 3/19/2019     Past Surgical History:   Procedure Laterality Date     ABLATE VEIN VARICOSE RADIO FREQUENCY WITH PHLEBECTOMY MULTIPLE STAB  2011    Dr. Do     ESOPHAGOSCOPY, GASTROSCOPY, DUODENOSCOPY (EGD),  COMBINED N/A 3/22/2019    Procedure: COMBINED ESOPHAGOSCOPY, GASTROSCOPY, DUODENOSCOPY (EGD);  Surgeon: Yazmin Alvarez MD;  Location: [facility]     Scleral lesion excision  2006     SINUS SURGERY  1979     SINUS SURGERY  1989     SINUS SURGERY  1999     SINUS SURGERY  2010    Dr. Samson     Current Outpatient Medications   Medication Sig Dispense Refill     apixaban ANTICOAGULANT (ELIQUIS) 5 MG tablet Take 1 tablet (5 mg) by mouth 2 times daily 60 tablet 1     OTC products: None, except as noted above    No Known Allergies   Latex Allergy: NO    Social History     Tobacco Use     Smoking status: Never Smoker     Smokeless tobacco: Never Used   Substance Use Topics     Alcohol use: Not Currently     Alcohol/week: 2.0 - 3.0 oz     Types: 4 - 6 Standard drinks or equivalent per week     History   Drug Use Not on file       REVIEW OF SYSTEMS:   Constitutional, HEENT, cardiovascular, pulmonary, gi and gu systems are negative, except as otherwise noted.    EXAM:   There were no vitals taken for this visit.  GENERAL APPEARANCE: healthy, alert and no distress  HENT: ear canals and TM's normal and nose and mouth without ulcers or lesions  RESP: lungs clear to auscultation - no rales, rhonchi or wheezes  CV: regular rate and rhythm, normal S1 S2, no S3 or S4 and no murmur, click or rub   ABDOMEN: soft, nontender, no HSM or masses and bowel sounds normal  NEURO: Normal strength and tone, sensory exam grossly normal, mentation intact and speech normal    DIAGNOSTICS:   No labs or EKG required for low risk surgery (cataract, skin procedure, breast biopsy, etc)    Recent Labs   Lab Test 04/04/19  1315 04/04/19 03/28/19  0811  03/21/19  0143   HGB  --  13.5 12.3*   < >  --    PLT  --  362 313   < >  --    INR  --   --   --   --  1.39*     --  134   < > 142   POTASSIUM 4.8  --  3.7   < > 4.1   CR 1.22  --  1.11   < > 1.50*    < > = values in this interval not displayed.        IMPRESSION:   Reason for  surgery/procedure: pancreatitis  Diagnosis/reason for consult: preoperative clearance    The proposed surgical procedure is considered LOW risk.    REVISED CARDIAC RISK INDEX  The patient has the following serious cardiovascular risks for perioperative complications such as (MI, PE, VFib and 3  AV Block):  No serious cardiac risks  INTERPRETATION: 0 risks: Class I (very low risk - 0.4% complication rate)    The patient has the following additional risks for perioperative complications:  No identified additional risks      ICD-10-CM    1. Preop general physical exam Z01.818        RECOMMENDATIONS:     --Patient is on no meds currently    APPROVAL GIVEN to proceed with proposed procedure, without further diagnostic evaluation     Signed Electronically by: Guy Ramirez MD    Copy of this evaluation report is provided to requesting physician.    [place] Preop Guidelines    Revised Cardiac Risk Index

## 2019-05-20 RX ORDER — FLUMAZENIL 0.1 MG/ML
0.2 INJECTION, SOLUTION INTRAVENOUS
Status: CANCELLED | OUTPATIENT
Start: 2019-05-20 | End: 2019-05-20

## 2019-05-20 RX ORDER — NALOXONE HYDROCHLORIDE 0.4 MG/ML
.1-.4 INJECTION, SOLUTION INTRAMUSCULAR; INTRAVENOUS; SUBCUTANEOUS
Status: CANCELLED | OUTPATIENT
Start: 2019-05-20 | End: 2019-05-21

## 2019-05-20 RX ORDER — LIDOCAINE 40 MG/G
CREAM TOPICAL
Status: CANCELLED | OUTPATIENT
Start: 2019-05-20

## 2020-09-02 ENCOUNTER — HOSPITAL ENCOUNTER (EMERGENCY)
Facility: CLINIC | Age: 65
Discharge: HOME | End: 2020-09-02
Attending: EMERGENCY MEDICINE | Admitting: EMERGENCY MEDICINE
Payer: COMMERCIAL

## 2020-09-02 VITALS
SYSTOLIC BLOOD PRESSURE: 148 MMHG | DIASTOLIC BLOOD PRESSURE: 64 MMHG | TEMPERATURE: 97.7 F | WEIGHT: 177 LBS | HEART RATE: 60 BPM | OXYGEN SATURATION: 98 % | HEIGHT: 69 IN | BODY MASS INDEX: 26.22 KG/M2 | RESPIRATION RATE: 18 BRPM

## 2020-09-02 DIAGNOSIS — S05.02XA ABRASION OF LEFT CORNEA, INITIAL ENCOUNTER: ICD-10-CM

## 2020-09-02 PROCEDURE — 25000125 ZZHC RX 250

## 2020-09-02 PROCEDURE — 99283 EMERGENCY DEPT VISIT LOW MDM: CPT

## 2020-09-02 RX ORDER — CIPROFLOXACIN HYDROCHLORIDE 3.5 MG/ML
1-2 SOLUTION/ DROPS TOPICAL 4 TIMES DAILY
Qty: 5 ML | Refills: 0 | Status: SHIPPED | OUTPATIENT
Start: 2020-09-02 | End: 2021-02-02

## 2020-09-02 RX ORDER — PROPARACAINE HYDROCHLORIDE 5 MG/ML
SOLUTION/ DROPS OPHTHALMIC
Status: COMPLETED
Start: 2020-09-02 | End: 2020-09-02

## 2020-09-02 RX ADMIN — PROPARACAINE HYDROCHLORIDE: 5 SOLUTION/ DROPS OPHTHALMIC at 02:38

## 2020-09-02 RX ADMIN — FLUORESCEIN SODIUM: 0.6 STRIP OPHTHALMIC at 02:38

## 2020-09-02 ASSESSMENT — MIFFLIN-ST. JEOR: SCORE: 1568.25

## 2020-09-02 ASSESSMENT — ENCOUNTER SYMPTOMS: EYE PAIN: 1

## 2020-09-02 NOTE — ED PROVIDER NOTES
"  History     Chief Complaint:  Eye Problem    HPI   Sanjeev Blackmon is a 65 year old male who presents with atraumatic left eye discomfort. The patient reports that the discomfort began at 1700 yesterday evening and got worse tonight. He is unable to open eye without pain. He has not taken any medication for the pain and does not wear any contacts.     Allergies:  No Known Drug Allergies     Medications:    Eliquis     Past Medical History:    Acute left arm DVT  Acute kidney injury  Gastroesophageal reflux disease with esophagitis  Atrial flutter  Nasal polyps  Sepsis    Past Surgical History:    Ablate vein varicose radio frequency with phlebectomy  Esophagoscopy  Gastroscopy  Duodenoscopy  Sclera lesion excision  Sinus surgery (x4)    Family History:    Father - Coronary Artery Disease    Social History:  The patient was accompanied to the ED by wife.  Smoking Status: Never Smoker  Smokeless tobacco:  Never Used  Alcohol Use: Not Currently  PCP:  Timmy Calix  Marital Status:   [2]     Review of Systems   Eyes: Positive for photophobia, pain and redness.   All other systems reviewed and are negative.      Physical Exam     Patient Vitals for the past 24 hrs:   BP Temp Temp src Pulse SpO2 Height Weight   09/02/20 0221 (!) 163/93 97.7  F (36.5  C) Temporal 60 100 % 1.753 m (5' 9\") 80.3 kg (177 lb)       Physical Exam  General: Appears well-developed and well-nourished.   Head: No signs of trauma.   Mouth/Throat: Oropharynx is clear and moist.   Eyes: left eye injected.  PERRL.  Corneal abrasion noted at 3 oclock position of left eye on slit lamp and fluorescein staining.  No foreign bodies noted.  Pressure of 12.  Neck: Normal range of motion.   CV: Normal rate and regular rhythm.    Resp: Effort normal and breath sounds normal. No respiratory distress.   MSK: Normal range of motion.  Neuro: The patient is alert and oriented.  Speech normal.  Skin: Skin is warm and dry. No rash noted.   Psych: " normal mood and affect. behavior is normal.       Emergency Department Course       Interventions:  0238 fluorescein 0.6 mg ophthalmic strip  0238 alcaine 0.5% ophthalmic soluntion    Emergency Department Course:  Past medical records, nursing notes, and vitals reviewed.    (0231)   I performed an exam of the patient as documented above. History obtained from patient.     (0248)   I rechecked the patient and discussed results and plan of care.     Findings and plan explained to the Patient. Patient discharged home with instructions regarding supportive care, medications, and reasons to return. The importance of close follow-up was reviewed. The patient was prescribed Ciloxan.    I personally answered all related questions prior to discharge.     Impression & Plan     Medical Decision Making:  Sanjeev Blackmon is a 65-year-old gentleman who presents due to left eye pain.  He states that he felt like something got in his eye at around 5PM and he woke up with the pain increased.  From my evaluation he did have noticeable injection of the left eye and some light sensitivity.  On fluorescein staining there was a clear area of corneal abrasion at approximately the 3 o'clock position.  There is no signs of any residual foreign body.  Pressures were normal.  Patient was discharged with antibiotic eyedrops along with Lacri-Lube with recommendations for supportive care.  I did offer prescription pain medication but the patient declined this.  He was recommended to follow-up with ophthalmology for recheck and to return for any worsening symptoms.    Diagnosis:    ICD-10-CM    1. Abrasion of left cornea, initial encounter  S05.02XA      Disposition:  Discharged to home.    Discharge Medications:  New Prescriptions    ARTIFICIAL TEARS OINT OPHTHALMIC OINTMENT    Place 0.5 g Into the left eye 4 times daily    CIPROFLOXACIN (CILOXAN) 0.3 % OPHTHALMIC SOLUTION    Place 1-2 drops Into the left eye 4 times daily       Scribe  Disclosure:  I, Mag Altman, am serving as a scribe at 2:26 AM on 9/2/2020 to document services personally performed by Bernabe Altamirano MD based on my observations and the provider's statements to me.   9/2/2020    EMERGENCY DEPARTMENT       Bernabe Altamirano MD  09/03/20 0323

## 2020-09-02 NOTE — ED AVS SNAPSHOT
Emergency Department  [address] [place] [address]  Phone:  [phone]  Fax:  [phone]                                    Sanjeev Blackmon   MRN: [id number]    Department:   Emergency Department   Date of Visit:  9/2/2020           After Visit Summary Signature Page    I have received my discharge instructions, and my questions have been answered. I have discussed any challenges I see with this plan with the nurse or doctor.    ..........................................................................................................................................  Patient/Patient Representative Signature      ..........................................................................................................................................  Patient Representative Print Name and Relationship to Patient    ..................................................               ................................................  Date                                   Time    ..........................................................................................................................................  Reviewed by Signature/Title    ...................................................              ..............................................  Date                                               Time          22EPIC Rev 08/18

## 2020-09-03 ASSESSMENT — ENCOUNTER SYMPTOMS
EYE REDNESS: 1
PHOTOPHOBIA: 1

## 2021-02-02 ENCOUNTER — VIRTUAL VISIT (OUTPATIENT)
Dept: FAMILY MEDICINE | Facility: CLINIC | Age: 66
End: 2021-02-02

## 2021-02-02 DIAGNOSIS — J01.90 ACUTE SINUSITIS WITH SYMPTOMS > 10 DAYS: Primary | ICD-10-CM

## 2021-02-02 PROCEDURE — 99213 OFFICE O/P EST LOW 20 MIN: CPT | Mod: 95 | Performed by: FAMILY MEDICINE

## 2021-02-02 NOTE — PROGRESS NOTES
"  Problem(s) Oriented visit      Video-Visit Details    Type of service:  Video Visit    Video Start Time (time video started): 320    Video End Time (time video stopped): 328    Originating Location (pt. Location): Home    Distant Location (provider location):  [facility]     Mode of Communication:  Telephone    Physician has received verbal consent for a Video Visit from the patient? Yes      Zion Rachel MD        Sanjeev Blackmon is being evaluated via a billable video visit.      The patient has been notified of following:     \"This video visit will be conducted via a call between you and your physician/provider. We have found that certain health care needs can be provided without the need for an in-person physical exam.  This service lets us provide the care you need with a video conversation.  If a prescription is necessary we can send it directly to your pharmacy.  If lab work is needed we can place an order for that and you can then stop by our lab to have the test done at a later time.    If during the course of the call the physician/provider feels a video visit is not appropriate, you will not be charged for this service.\"     Physician has received verbal consent for a Video Visit from the patient? Yes    SUBJECTIVE:                                                      Sanjeev Blackmon is a 65 year old male who is being seen through video consult for evaluation.  Reports about 3 weeks of persistent nasal drainage and pressure.  No improvement.  Denies fever, chills, headache, anosmia, chest pain or SOB.      Histories:   Patient Active Problem List   Diagnosis     Nasal polyps     Advanced directives, counseling/discussion     Gastroesophageal reflux disease with esophagitis     Acute Left Arm DVT -- Cephalic Vein 3/26/19     Past Surgical History:   Procedure Laterality Date     ABLATE VEIN VARICOSE RADIO FREQUENCY WITH PHLEBECTOMY MULTIPLE STAB  2011    Dr. Do     ESOPHAGOSCOPY, " "GASTROSCOPY, DUODENOSCOPY (EGD), COMBINED N/A 3/22/2019    Procedure: COMBINED ESOPHAGOSCOPY, GASTROSCOPY, DUODENOSCOPY (EGD);  Surgeon: Yazmin Alvarez MD;  Location: [facility]     Scleral lesion excision       SINUS SURGERY       SINUS SURGERY       SINUS SURGERY       SINUS SURGERY      Dr. Samson       Social History     Tobacco Use     Smoking status: Never Smoker     Smokeless tobacco: Never Used   Substance Use Topics     Alcohol use: Not Currently     Alcohol/week: 3.3 - 5.0 standard drinks     Types: 4 - 6 Standard drinks or equivalent per week     Family History   Problem Relation Age of Onset     Coronary Artery Disease Father          at 73     No Known Problems Mother            Reviewed and updated as needed this visit by Provider                 ROS:    A 10 system review was completed and is as noted in HPI and otherwise negative.            OBJECTIVE:                                                      Objective    There were no vitals taken for this visit.  Estimated body mass index is 26.14 kg/m  as calculated from the following:    Height as of 20: 1.753 m (5' 9\").    Weight as of 20: 80.3 kg (177 lb).      Gen: Well sounding, NAD              ASSESSMENT/PLAN:                                                          Acute sinusitis with symptoms > 10 days  -     amoxicillin-clavulanate (AUGMENTIN) 875-125 MG tablet; Take 1 tablet by mouth 2 times daily for 7 days  We discussed that nearly all cases of sinusitis are viral and that antibiotics generally do not improve outcomes.  Due to duration of symptoms abx were prescribed. Sinus irrigation was discussed. Use OTC analgesics, oral decongestants, and decongestant nasal spray (maximum 3 days, side effects discussed). RTC PRN if not gradually improving. Watch for fever or facial swelling or increasing tooth pain and return if questions develop or problems occur. The patient was discharged in stable " condition.

## 2021-03-04 DIAGNOSIS — Z23 HIGH PRIORITY FOR COVID-19 VIRUS VACCINATION: Primary | ICD-10-CM

## 2021-03-04 PROCEDURE — 91301 PR COVID VAC MODERNA 100 MCG/0.5 ML IM: CPT | Performed by: FAMILY MEDICINE

## 2021-03-04 PROCEDURE — 0011A PR COVID VAC MODERNA 100 MCG/0.5 ML IM: CPT | Performed by: FAMILY MEDICINE

## 2021-04-01 DIAGNOSIS — Z23 HIGH PRIORITY FOR COVID-19 VIRUS VACCINATION: Primary | ICD-10-CM

## 2021-04-01 PROCEDURE — 0012A COVID-19,PF,MODERNA: CPT | Performed by: FAMILY MEDICINE

## 2021-04-01 PROCEDURE — 91301 COVID-19,PF,MODERNA: CPT | Performed by: FAMILY MEDICINE

## 2021-12-14 ENCOUNTER — OFFICE VISIT (OUTPATIENT)
Dept: FAMILY MEDICINE | Facility: CLINIC | Age: 66
End: 2021-12-14

## 2021-12-14 VITALS
RESPIRATION RATE: 16 BRPM | WEIGHT: 194.38 LBS | HEIGHT: 69 IN | SYSTOLIC BLOOD PRESSURE: 146 MMHG | BODY MASS INDEX: 28.79 KG/M2 | HEART RATE: 72 BPM | DIASTOLIC BLOOD PRESSURE: 86 MMHG | OXYGEN SATURATION: 98 %

## 2021-12-14 DIAGNOSIS — Z13.228 ENCOUNTER FOR SCREENING FOR METABOLIC DISORDER: ICD-10-CM

## 2021-12-14 DIAGNOSIS — Z00.00 ENCOUNTER FOR MEDICARE ANNUAL WELLNESS EXAM: Primary | ICD-10-CM

## 2021-12-14 DIAGNOSIS — Z12.5 PROSTATE CANCER SCREENING: ICD-10-CM

## 2021-12-14 DIAGNOSIS — Z13.220 SCREENING FOR LIPOID DISORDERS: ICD-10-CM

## 2021-12-14 DIAGNOSIS — Z13.0 ENCOUNTER FOR SCREENING FOR HEMATOLOGIC DISORDER: ICD-10-CM

## 2021-12-14 DIAGNOSIS — Z23 ENCOUNTER FOR IMMUNIZATION: ICD-10-CM

## 2021-12-14 DIAGNOSIS — L57.0 ACTINIC KERATOSIS: ICD-10-CM

## 2021-12-14 DIAGNOSIS — R03.0 ELEVATED BLOOD PRESSURE READING WITHOUT DIAGNOSIS OF HYPERTENSION: ICD-10-CM

## 2021-12-14 DIAGNOSIS — Z12.11 SCREEN FOR COLON CANCER: ICD-10-CM

## 2021-12-14 PROBLEM — I82.622 ARM DVT (DEEP VENOUS THROMBOEMBOLISM), ACUTE, LEFT (H): Status: RESOLVED | Noted: 2019-03-26 | Resolved: 2021-12-14

## 2021-12-14 LAB
% GRANULOCYTES: 65.7 % (ref 42.2–75.2)
HCT VFR BLD AUTO: 47.1 % (ref 39–51)
HEMOGLOBIN: 15.1 G/DL (ref 13.4–17.5)
LYMPHOCYTES NFR BLD AUTO: 26.1 % (ref 20.5–51.1)
MCH RBC QN AUTO: 29.2 PG (ref 27–31)
MCHC RBC AUTO-ENTMCNC: 32.2 G/DL (ref 33–37)
MCV RBC AUTO: 90.6 FL (ref 80–100)
MONOCYTES NFR BLD AUTO: 8.2 % (ref 1.7–9.3)
PLATELET # BLD AUTO: 186 K/UL (ref 140–450)
RBC # BLD AUTO: 5.19 X10/CMM (ref 4.2–5.9)
WBC # BLD AUTO: 6.5 X10/CMM (ref 3.8–11)

## 2021-12-14 PROCEDURE — 85025 COMPLETE CBC W/AUTO DIFF WBC: CPT | Performed by: FAMILY MEDICINE

## 2021-12-14 PROCEDURE — 90732 PPSV23 VACC 2 YRS+ SUBQ/IM: CPT | Performed by: FAMILY MEDICINE

## 2021-12-14 PROCEDURE — 17003 DESTRUCT PREMALG LES 2-14: CPT | Mod: 59 | Performed by: FAMILY MEDICINE

## 2021-12-14 PROCEDURE — 36415 COLL VENOUS BLD VENIPUNCTURE: CPT | Performed by: FAMILY MEDICINE

## 2021-12-14 PROCEDURE — 17000 DESTRUCT PREMALG LESION: CPT | Performed by: FAMILY MEDICINE

## 2021-12-14 PROCEDURE — 99214 OFFICE O/P EST MOD 30 MIN: CPT | Mod: 25 | Performed by: FAMILY MEDICINE

## 2021-12-14 PROCEDURE — G0439 PPPS, SUBSEQ VISIT: HCPCS | Performed by: FAMILY MEDICINE

## 2021-12-14 PROCEDURE — G0009 ADMIN PNEUMOCOCCAL VACCINE: HCPCS | Mod: 59 | Performed by: FAMILY MEDICINE

## 2021-12-14 ASSESSMENT — MIFFLIN-ST. JEOR: SCORE: 1642.06

## 2021-12-14 NOTE — LETTER
[facility] [address] [place]  [address]  Phone: [phone]    December 27, 2021      Sanjeev Blackmon  [address] [facility] [address]              Dear Sanjeev,        Your cholesterol levels are fairly stable from previous years but is mildly elevated.       Your cholesterol levels are above the normal range.  Using a risk calculator that factors in your age, blood pressure, and cholesterol (as well as other factors), we are able to estimate the chance of a heart attack or stroke over the next 10 years.  Based on your information, your risk is estimated to be 17%. This is an estimate and is a generalized risk based on certain factors, rather than personalized.  Current guidelines recommend initiating medication when the risk exceeds 7.5%.  If you would like to proceed with medication, I can send a new prescription to help reduce your risk.  If you are interested in obtaining more personalized information to help you make a decision, we could obtain a coronary artery calcium scan.  These are low dose CT scans with relatively minimal radiation exposure and cost approximately 100-150 dollars out of pocket.  More information about this test and why it may be helpful to obtain can be found here:       [email or web address]     If you would like to discuss this further I am happy to go over it on the phone or at your next follow up appointment.     Your CMP is a blood test which evaluates your liver function, blood electrolytes (blood salts) and your kidney function.  Your results are normal.     Your fasting blood sugar is just slightly elevated.  We will recheck this within one year and if remains elevated will obtain more specific testing.     Your blood counts (white blood cells, red blood cells and platelets) are all normal.     Your prostate blood test is also normal.     It was very nice seeing you.  If you have any  questions, please contact our office.       Sincerely,     Zion Rachel MD       Results for orders placed or performed in visit on 12/14/21   Lipid Panel ([facility])     Status: Abnormal   Result Value Ref Range    Cholesterol 222 (H) 100 - 199 mg/dL    Triglycerides 85 0 - 149 mg/dL    HDL Cholesterol 69 >39 mg/dL    VLDL Cholesterol Manuel 15 5 - 40 mg/dL    LDL Cholesterol Calculated 138 (H) 0 - 99 mg/dL    LDL/HDL Ratio 2.0 0.0 - 3.6 ratio    Narrative    Performed at:  01 - [facility] [place]  [address], [place], [place]  [id number]  : Alex Aguirre MD, Phone:  [phone]   Comp. Metabolic Panel (14) ([facility])     Status: Abnormal   Result Value Ref Range    Glucose 101 (H) 65 - 99 mg/dL    Urea Nitrogen 24 8 - 27 mg/dL    Creatinine 1.07 0.76 - 1.27 mg/dL    eGFR If NonAfricn Am 71 >59 mL/min/1.73    eGFR If Africn Am 82 >59 mL/min/1.73    BUN/Creatinine Ratio 22 10 - 24    Sodium 138 134 - 144 mmol/L    Potassium 4.3 3.5 - 5.2 mmol/L    Chloride 103 96 - 106 mmol/L    Total CO2 22 20 - 29 mmol/L    Calcium 9.2 8.6 - 10.2 mg/dL    Protein Total 7.1 6.0 - 8.5 g/dL    Albumin 4.2 3.8 - 4.8 g/dL    Globulin, Total 2.9 1.5 - 4.5 g/dL    A/G Ratio 1.4 1.2 - 2.2    Bilirubin Total 0.4 0.0 - 1.2 mg/dL    Alkaline Phosphatase 72 44 - 121 IU/L    AST 16 0 - 40 IU/L    ALT 18 0 - 44 IU/L    Narrative    Performed at:  01 - [facility] [place]  [address], [place], [place]  [id number]  : Alex Aguirre MD, Phone:  [phone]   CBC with Diff/Plt ([facility])     Status: Abnormal   Result Value Ref Range    WBC x10/cmm 6.5 3.8 - 11.0 x10/cmm    % Lymphocytes 26.1 20.5 - 51.1 %    % Monocytes 8.2 1.7 - 9.3 %    % Granulocytes 65.7 42.2 - 75.2 %    RBC x10/cmm 5.19 4.2 - 5.9 x10/cmm    Hemoglobin 15.1 13.4 - 17.5 g/dl    Hematocrit 47.1 39 - 51 %    MCV 90.6 80 - 100 fL    MCH 29.2 27.0 - 31.0 pg    MCHC 32.2 (A) 33.0 - 37.0 g/dL    Platelet Count 186 140 - 450 K/uL   PSA Total (Reflex To Free)  ([facility])     Status: None   Result Value Ref Range    PSA NG/ML 0.3 0.0 - 4.0 ng/mL    . Comment     Narrative    Performed at:  01 - [facility] [place]  [address], [place], [place]  [id number]  : Alex Aguirre MD, Phone:  [phone]

## 2021-12-14 NOTE — PROGRESS NOTES
"  SUBJECTIVE:   Sanjeev Blackmon is a 66 year old male who presents for Preventive Visit.      Patient has been advised of split billing requirements and indicates understanding:   Are you in the first 12 months of your Medicare Part B coverage?  No    Doing well.  BP high at home last day or two.  No CP, SOB.    Had sepsis 2019 unknown origin and resultant afib with RVR.  Resolved with CV and did not recur.  Monitor clear.  Completed 30 days eliquis.  Also UE DVT from IV.      Physical Health:    In general, how would you rate your overall physical health? excellent    Outside of work, how many days during the week do you exercise? 6-7 days/week    Outside of work, approximately how many minutes a day do you exercise?greater than 60 minutes    If you drink alcohol do you typically have >3 drinks per day or >7 drinks per week? No    Do you usually eat at least 4 servings of fruit and vegetables a day, include whole grains & fiber and avoid regularly eating high fat or \"junk\" foods? NO    Do you have any problems taking medications regularly?  No    Do you have any side effects from medications? none    Needs assistance for the following daily activities: no assistance needed    Which of the following safety concerns are present in your home?  none identified     Hearing impairment: No    In the past 6 months, have you been bothered by leaking of urine? no    Mental Health:    In general, how would you rate your overall mental or emotional health? excellent  PHQ-2 Score:      Do you feel safe in your environment? Yes    Have you ever done Advance Care Planning? (For example, a Health Directive, POLST, or a discussion with a medical provider or your loved ones about your wishes): Yes, advance care planning is on file.    Additional concerns to address?  No    Fall risk:       Cognitive Screenin) Repeat 3 items (Leader, Season, Table)    2) Clock draw: NORMAL  3) 3 item recall: Recalls 3 objects  Results: 3 items " recalled: COGNITIVE IMPAIRMENT LESS LIKELY    Mini-CogTM Copyright CLAUDIO Hogue. Licensed by the author for use in [facility]; reprinted with permission (mode@[facility]). All rights reserved.      Do you have sleep apnea, excessive snoring or daytime drowsiness?: no    -------------------------------------    Reviewed and updated as needed this visit by clinical staff  Tobacco  Allergies  Meds  Problems  Med Hx  Surg Hx  Fam Hx         Reviewed and updated as needed this visit by Provider  Tobacco  Allergies  Meds  Problems  Med Hx  Surg Hx  Fam Hx        Social History     Tobacco Use     Smoking status: Never Smoker     Smokeless tobacco: Never Used   Substance Use Topics     Alcohol use: Not Currently     Alcohol/week: 3.3 - 5.0 standard drinks     Types: 4 - 6 Standard drinks or equivalent per week                           Current providers sharing in care for this patient include:   Patient Care Team:  Zion Rachel MD as PCP - General (Family Medicine)  Zion Rachel MD as Assigned PCP    The following health maintenance items are reviewed in Epic and correct as of today:  Health Maintenance   Topic Date Due     COLORECTAL CANCER SCREENING  Never done     ZOSTER IMMUNIZATION (1 of 2) Never done     ADVANCE CARE PLANNING  12/17/2017     LIPID  02/27/2020     INFLUENZA VACCINE (1) Never done     FALL RISK ASSESSMENT  02/02/2022     MEDICARE ANNUAL WELLNESS VISIT  12/14/2022     DTAP/TDAP/TD IMMUNIZATION (3 - Td or Tdap) 05/30/2024     HEPATITIS C SCREENING  Completed     PHQ-2  Completed     Pneumococcal Vaccine: 65+ Years  Completed     AORTIC ANEURYSM SCREENING (SYSTEM ASSIGNED)  Completed     COVID-19 Vaccine  Completed     IPV IMMUNIZATION  Aged Out     MENINGITIS IMMUNIZATION  Aged Out     HEPATITIS B IMMUNIZATION  Aged Out     Lab work is in process      ROS:  Constitutional, HEENT, cardiovascular, pulmonary, GI, , musculoskeletal, neuro, skin, endocrine and psych  "systems are negative, except as otherwise noted.    OBJECTIVE:   BP (!) 146/86   Pulse 72   Resp 16   Ht 1.753 m (5' 9\")   Wt 88.2 kg (194 lb 6 oz)   SpO2 98%   BMI 28.70 kg/m   Estimated body mass index is 28.7 kg/m  as calculated from the following:    Height as of this encounter: 1.753 m (5' 9\").    Weight as of this encounter: 88.2 kg (194 lb 6 oz).  EXAM:   GENERAL: healthy, alert and no distress  EYES: Eyes grossly normal to inspection, PERRL and conjunctivae and sclerae normal  HENT: ear canals and TM's normal, nose and mouth without ulcers or lesions  NECK: no adenopathy, no asymmetry, masses, or scars and thyroid normal to palpation  RESP: lungs clear to auscultation - no rales, rhonchi or wheezes  CV: regular rate and rhythm, normal S1 S2, no S3 or S4, no murmur, click or rub, no peripheral edema and peripheral pulses strong  ABDOMEN: soft, nontender, no hepatosplenomegaly, no masses and bowel sounds normal  MS: no gross musculoskeletal defects noted, no edema  SKIN: keratoses - actinic # 6  NEURO: Normal strength and tone, mentation intact and speech normal  PSYCH: mentation appears normal, affect normal/bright    PROCEDURE:    After the potential risks were discussed, verbal consent was obtained.  Approximately 15 seconds of freeze time was applied to #6 lesions on face using open spray technique with a 1 mm margin using 3 freeze-thaw cycles.  There were no immediate complications.  The patient tolerated the procedure well.  Frequent use of vaseline was recommended to promote good healing.  Signs of infection or complications were discussed and understood.      ASSESSMENT / PLAN:   Encounter for Medicare annual wellness exam  - discussed preventative guidelines, healthy diet, exercise and weight management    Encounter for immunization  - PNEUMOCOCCAL VACCINE ADMIN    Screening for lipoid disorders  - Lipid Panel ([facility])  - VENOUS COLLECTION    Encounter for screening for metabolic disorder  - " "Comp. Metabolic Panel (14) ([facility])  - VENOUS COLLECTION    Encounter for screening for hematologic disorder  - CBC with Diff/Plt ([facility])    Screen for colon cancer  - COLOGUARD([employer])    Prostate cancer screening  - We had a thorough discussion of the potential harms and benefits of PSA screening and he has elected to proceed with screening after discussing current guidelines and evidence  - PSA Total (Reflex To Free) ([facility])  - VENOUS COLLECTION    Actinic keratosis  Cryo as above.  Consider 5-fluoruracil in future.  Sun protection        Patient has been advised of split billing requirements and indicates understanding: Yes    COUNSELING:  Reviewed preventive health counseling, as reflected in patient instructions    Estimated body mass index is 28.7 kg/m  as calculated from the following:    Height as of this encounter: 1.753 m (5' 9\").    Weight as of this encounter: 88.2 kg (194 lb 6 oz).    Weight management plan: Discussed healthy diet and exercise guidelines    He reports that he has never smoked. He has never used smokeless tobacco.    Appropriate preventive services were discussed with this patient, including applicable screening as appropriate for cardiovascular disease, diabetes, osteopenia/osteoporosis, and glaucoma.  As appropriate for age/gender, discussed screening for colorectal cancer, prostate cancer, breast cancer, and cervical cancer. Checklist reviewing preventive services available has been given to the patient.    Reviewed patients plan of care and provided an AVS. The Basic Care Plan (routine screening as documented in Health Maintenance) for Sanjeev meets the Care Plan requirement. This Care Plan has been established and reviewed with the Patient.    Counseling Resources:  ATP IV Guidelines  Pooled Cohorts Equation Calculator  Breast Cancer Risk Calculator  BRCA-Related Cancer Risk Assessment: FHS-7 Tool  FRAX Risk Assessment  ICSI Preventive Guidelines  Dietary Guidelines for " Americans, 2010  USDA's MyPlate  ASA Prophylaxis  Lung CA Screening    Zion Rachel MD  [facility]

## 2021-12-14 NOTE — PATIENT INSTRUCTIONS
Please follow up for going over your blood pressures.  Check 1-2 times per day and record them.    Reduce salt if possible.      Patient Education   Personalized Prevention Plan  You are due for the preventive services outlined below.  Your care team is available to assist you in scheduling these services.  If you have already completed any of these items, please share that information with your care team to update in your medical record.  Health Maintenance Due   Topic Date Due     Colorectal Cancer Screening  Never done     Zoster (Shingles) Vaccine (1 of 2) Never done     Discuss Advance Care Planning  12/17/2017     Pneumococcal Vaccine (1 of 1 - PPSV23) Never done     Cholesterol Lab  02/27/2020     Flu Vaccine (1) Never done

## 2021-12-16 LAB
.: NORMAL
ALBUMIN SERPL-MCNC: 4.2 G/DL (ref 3.8–4.8)
ALBUMIN/GLOB SERPL: 1.4 {RATIO} (ref 1.2–2.2)
ALP SERPL-CCNC: 72 IU/L (ref 44–121)
ALT SERPL-CCNC: 18 IU/L (ref 0–44)
AST SERPL-CCNC: 16 IU/L (ref 0–40)
BILIRUB SERPL-MCNC: 0.4 MG/DL (ref 0–1.2)
BUN SERPL-MCNC: 24 MG/DL (ref 8–27)
BUN/CREATININE RATIO: 22 (ref 10–24)
CALCIUM SERPL-MCNC: 9.2 MG/DL (ref 8.6–10.2)
CHLORIDE SERPLBLD-SCNC: 103 MMOL/L (ref 96–106)
CHOLEST SERPL-MCNC: 222 MG/DL (ref 100–199)
CREAT SERPL-MCNC: 1.07 MG/DL (ref 0.76–1.27)
EGFR IF AFRICN AM: 82 ML/MIN/1.73
EGFR IF NONAFRICN AM: 71 ML/MIN/1.73
GLOBULIN, TOTAL: 2.9 G/DL (ref 1.5–4.5)
GLUCOSE SERPL-MCNC: 101 MG/DL (ref 65–99)
HDLC SERPL-MCNC: 69 MG/DL
LDL/HDL RATIO: 2 RATIO (ref 0–3.6)
LDLC SERPL CALC-MCNC: 138 MG/DL (ref 0–99)
POTASSIUM SERPL-SCNC: 4.3 MMOL/L (ref 3.5–5.2)
PROT SERPL-MCNC: 7.1 G/DL (ref 6–8.5)
PSA NG/ML: 0.3 NG/ML (ref 0–4)
SODIUM SERPL-SCNC: 138 MMOL/L (ref 134–144)
TOTAL CO2: 22 MMOL/L (ref 20–29)
TRIGL SERPL-MCNC: 85 MG/DL (ref 0–149)
VLDLC SERPL CALC-MCNC: 15 MG/DL (ref 5–40)

## 2022-01-02 ENCOUNTER — TRANSFERRED RECORDS (OUTPATIENT)
Dept: FAMILY MEDICINE | Facility: CLINIC | Age: 67
End: 2022-01-02

## 2022-01-02 LAB — COLOGUARD-ABSTRACT: NEGATIVE

## 2022-01-11 ENCOUNTER — OFFICE VISIT (OUTPATIENT)
Dept: FAMILY MEDICINE | Facility: CLINIC | Age: 67
End: 2022-01-11

## 2022-01-11 VITALS
SYSTOLIC BLOOD PRESSURE: 132 MMHG | HEART RATE: 80 BPM | DIASTOLIC BLOOD PRESSURE: 82 MMHG | BODY MASS INDEX: 29.33 KG/M2 | WEIGHT: 198.6 LBS | OXYGEN SATURATION: 98 %

## 2022-01-11 DIAGNOSIS — R03.0 ELEVATED BLOOD PRESSURE READING WITHOUT DIAGNOSIS OF HYPERTENSION: ICD-10-CM

## 2022-01-11 DIAGNOSIS — E78.5 DYSLIPIDEMIA: Primary | ICD-10-CM

## 2022-01-11 PROCEDURE — 99213 OFFICE O/P EST LOW 20 MIN: CPT | Performed by: FAMILY MEDICINE

## 2022-01-11 NOTE — PROGRESS NOTES
Gage Pace is a 66 year old patient who presents to clinic for follow up.  He has been monitoring his BP at home.  Majority readings 130s/80s.  About 5-10% SBP 140s.  HE feels well.  Has been decreasing salt intake.  Would prefer to stay off medication.    Also discussed ASCVD risk based on recent numbers.  He is interested in obtaining a calcium score.        Review of Systems   Constitutional, HEENT, cardiovascular, pulmonary, GI, , musculoskeletal, neuro, skin, endocrine and psych systems are negative, except as otherwise noted.      Objective    /82   Pulse 80   Wt 90.1 kg (198 lb 9.6 oz)   SpO2 98%   BMI 29.33 kg/m      General: Well appearing, NAD  Psych: normal mood and affect        No results found for this or any previous visit (from the past 24 hour(s)).    Dyslipidemia  Proceed with calcium scan for further risk stratification  - Radiology Referral; Future    Elevated blood pressure reading without diagnosis of hypertension  Majority readings at goal.  Work on lifestyle changes and continue to monitor at home.  He prefers to stay off medication if possible.

## 2022-01-17 ENCOUNTER — TRANSFERRED RECORDS (OUTPATIENT)
Dept: FAMILY MEDICINE | Facility: CLINIC | Age: 67
End: 2022-01-17

## 2022-01-18 DIAGNOSIS — R93.1 AGATSTON CORONARY ARTERY CALCIUM SCORE LESS THAN 100: Primary | ICD-10-CM

## 2022-01-18 RX ORDER — ATORVASTATIN CALCIUM 20 MG/1
20 TABLET, FILM COATED ORAL DAILY
Qty: 90 TABLET | Refills: 3 | Status: SHIPPED | OUTPATIENT
Start: 2022-01-18 | End: 2023-01-16

## 2023-01-15 DIAGNOSIS — R93.1 AGATSTON CORONARY ARTERY CALCIUM SCORE LESS THAN 100: ICD-10-CM

## 2023-01-16 RX ORDER — ATORVASTATIN CALCIUM 20 MG/1
TABLET, FILM COATED ORAL
Qty: 30 TABLET | Refills: 0 | Status: SHIPPED | OUTPATIENT
Start: 2023-01-16 | End: 2023-02-08

## 2023-01-16 NOTE — TELEPHONE ENCOUNTER
ATORVASTATIN     LOV: 1/11/2022    *PATIENT DUE FOR CPX/LIPID CK SINCE LAST VISIT WAS OVER 1 YR AGO. NO APPT SCHEDULED. PLEASE CALL TO SCHEDULE THANKS    Cholesterol   Date Value Ref Range Status   12/14/2021 222 (H) 100 - 199 mg/dL Final   02/27/2015 233 (H) 100 - 199 mg/dL Final     HDL Cholesterol   Date Value Ref Range Status   12/14/2021 69 >39 mg/dL Final   02/27/2015 78 >39 mg/dL Final     Comment:     According to ATP-III Guidelines, HDL-C >59 mg/dL is considered a  negative risk factor for CHD.       LDL Cholesterol Calculated   Date Value Ref Range Status   12/14/2021 138 (H) 0 - 99 mg/dL Final   02/27/2015 142 (H) 0 - 99 mg/dL Final     Triglycerides   Date Value Ref Range Status   12/14/2021 85 0 - 149 mg/dL Final   02/27/2015 66 0 - 149 mg/dL Final     No results found for: KARY

## 2023-01-16 NOTE — TELEPHONE ENCOUNTER
1/16/23 left message for the patient to call the office.  Due for lipid and medication check    Arturo Leahy,   [facility]  [phone]

## 2023-02-08 DIAGNOSIS — R93.1 AGATSTON CORONARY ARTERY CALCIUM SCORE LESS THAN 100: ICD-10-CM

## 2023-02-08 RX ORDER — ATORVASTATIN CALCIUM 20 MG/1
20 TABLET, FILM COATED ORAL DAILY
Qty: 30 TABLET | Refills: 0 | Status: SHIPPED | OUTPATIENT
Start: 2023-02-08 | End: 2023-03-09

## 2023-02-08 NOTE — TELEPHONE ENCOUNTER
Wife is in clinic and stated they are leaving for [place] next week, going for 6 weeks. Pt has only 3 weeks left of Atorvastatin  Pt has a follow up appt in march. Pt and wife have concerns about taking the medication, should he take have the tablet until returning? Or get another fill?

## 2023-03-09 DIAGNOSIS — R93.1 AGATSTON CORONARY ARTERY CALCIUM SCORE LESS THAN 100: ICD-10-CM

## 2023-03-09 NOTE — TELEPHONE ENCOUNTER
atorvastatin (LIPITOR) 20 MG    LOV 1/11/22    Last labs12/14/21  Recent Labs   Lab Test 12/14/21  0917 02/27/15  1117   CHOL 222* 233*   HDL 69 78   * 142*   TRIG 85 66     Has appt on 3/27/23

## 2023-03-10 RX ORDER — ATORVASTATIN CALCIUM 20 MG/1
TABLET, FILM COATED ORAL
Qty: 30 TABLET | Refills: 0 | Status: SHIPPED | OUTPATIENT
Start: 2023-03-10 | End: 2023-03-27

## 2023-03-27 ENCOUNTER — OFFICE VISIT (OUTPATIENT)
Dept: FAMILY MEDICINE | Facility: CLINIC | Age: 68
End: 2023-03-27

## 2023-03-27 VITALS
WEIGHT: 210.4 LBS | BODY MASS INDEX: 31.16 KG/M2 | HEIGHT: 69 IN | OXYGEN SATURATION: 97 % | HEART RATE: 63 BPM | DIASTOLIC BLOOD PRESSURE: 86 MMHG | SYSTOLIC BLOOD PRESSURE: 136 MMHG

## 2023-03-27 DIAGNOSIS — E87.5 SERUM POTASSIUM ELEVATED: ICD-10-CM

## 2023-03-27 DIAGNOSIS — R93.1 AGATSTON CORONARY ARTERY CALCIUM SCORE LESS THAN 100: ICD-10-CM

## 2023-03-27 DIAGNOSIS — Z23 ENCOUNTER FOR IMMUNIZATION: ICD-10-CM

## 2023-03-27 DIAGNOSIS — Z00.00 ENCOUNTER FOR MEDICARE ANNUAL WELLNESS EXAM: Primary | ICD-10-CM

## 2023-03-27 DIAGNOSIS — I70.0 ATHEROSCLEROSIS OF ABDOMINAL AORTA (H): ICD-10-CM

## 2023-03-27 DIAGNOSIS — I25.10 CORONARY ARTERY DISEASE INVOLVING NATIVE CORONARY ARTERY OF NATIVE HEART WITHOUT ANGINA PECTORIS: ICD-10-CM

## 2023-03-27 DIAGNOSIS — Z12.5 PROSTATE CANCER SCREENING: ICD-10-CM

## 2023-03-27 DIAGNOSIS — L57.0 ACTINIC KERATOSIS: ICD-10-CM

## 2023-03-27 DIAGNOSIS — R03.0 ELEVATED BLOOD PRESSURE READING WITHOUT DIAGNOSIS OF HYPERTENSION: ICD-10-CM

## 2023-03-27 LAB
CHOL/HDL RATIO (RMG): 2.2 MG/DL (ref 0–4.5)
CHOLESTEROL: 162 MG/DL (ref 100–199)
HDL (RMG): 73 MG/DL (ref 40–?)
LDL CALCULATED (RMG): 71 MG/DL (ref 0–130)
TRIGLYCERIDES (RMG): 94 MG/DL (ref 0–149)

## 2023-03-27 PROCEDURE — 90677 PCV20 VACCINE IM: CPT | Performed by: FAMILY MEDICINE

## 2023-03-27 PROCEDURE — 99214 OFFICE O/P EST MOD 30 MIN: CPT | Mod: 25 | Performed by: FAMILY MEDICINE

## 2023-03-27 PROCEDURE — 17000 DESTRUCT PREMALG LESION: CPT | Performed by: FAMILY MEDICINE

## 2023-03-27 PROCEDURE — G0009 ADMIN PNEUMOCOCCAL VACCINE: HCPCS | Mod: 59 | Performed by: FAMILY MEDICINE

## 2023-03-27 PROCEDURE — 36415 COLL VENOUS BLD VENIPUNCTURE: CPT | Performed by: FAMILY MEDICINE

## 2023-03-27 PROCEDURE — 17003 DESTRUCT PREMALG LES 2-14: CPT | Mod: 59 | Performed by: FAMILY MEDICINE

## 2023-03-27 PROCEDURE — G0439 PPPS, SUBSEQ VISIT: HCPCS | Performed by: FAMILY MEDICINE

## 2023-03-27 PROCEDURE — 80061 LIPID PANEL: CPT | Mod: QW | Performed by: FAMILY MEDICINE

## 2023-03-27 RX ORDER — MULTIVIT WITH MINERALS/LUTEIN
1 TABLET ORAL DAILY
COMMUNITY

## 2023-03-27 RX ORDER — ATORVASTATIN CALCIUM 20 MG/1
20 TABLET, FILM COATED ORAL DAILY
Qty: 90 TABLET | Refills: 3 | Status: SHIPPED | OUTPATIENT
Start: 2023-03-27 | End: 2024-04-09 | Stop reason: ALTCHOICE

## 2023-03-27 NOTE — LETTER
[facility] [address] [place]  [address]  Phone: [phone]    March 30, 2023      Sanjeev Blackmon  [address] [facility] [address]          Dear Sanjeev,     Your kidney and liver function is normal.     Your potassium is slightly elevated.  I recommend repeating this in 1-2 weeks to recheck.  This can be a lab appointment.     Your prostate blood test is normal and stable.     Your cholesterol levels are normal.     It was very nice seeing you.  If you have any questions, please contact our office.         Sincerely,     Zion Rachel MD       Results for orders placed or performed in visit on 03/27/23   Comp. Metabolic Panel (14) ([facility])     Status: Abnormal   Result Value Ref Range    Glucose 104 (H) 70 - 99 mg/dL    Urea Nitrogen 22 8 - 27 mg/dL    Creatinine 1.19 0.76 - 1.27 mg/dL    eGFR  66 >59 mL/min/1.73    BUN/Creatinine Ratio 18 10 - 24    Sodium 142 134 - 144 mmol/L    Potassium 5.3 (H) 3.5 - 5.2 mmol/L    Chloride 104 96 - 106 mmol/L    Total CO2 23 20 - 29 mmol/L    Calcium 9.9 8.6 - 10.2 mg/dL    Protein Total 7.5 6.0 - 8.5 g/dL    Albumin 4.6 3.8 - 4.8 g/dL    Globulin, Total 2.9 1.5 - 4.5 g/dL    A/G Ratio 1.6 1.2 - 2.2    Bilirubin Total 0.7 0.0 - 1.2 mg/dL    Alkaline Phosphatase 86 44 - 121 IU/L    AST 20 0 - 40 IU/L    ALT 23 0 - 44 IU/L    Narrative    Performed at:  01 - [facility] [address], [place], [place]  [id number]  : Alex Aguirre MD, Phone:  [phone]   PSA Serum ([facility])     Status: None   Result Value Ref Range    PSA NG/ML 0.3 0.0 - 4.0 ng/mL    Narrative    Performed at:  01 - [facility] [address], [place], [place]  [id number]  : Alex Aguirre MD, Phone:  [phone]   Lipid Profile ([facility])     Status: None   Result Value Ref Range    CHOLESTEROL 162 100 - 199 mg/dl    HDL 73 40 mg/dl    TRIGLYCERIDES ([facility]) 94 0 - 149 mg/dl    LDL CALCULATED ([facility]) 71 0 - 130 mg/dl    CHOL/HDL RATIO ([facility]) 2.2 0.0 -  4.5 mg/dl

## 2023-03-27 NOTE — PATIENT INSTRUCTIONS
Patient Education   Personalized Prevention Plan  You are due for the preventive services outlined below.  Your care team is available to assist you in scheduling these services.  If you have already completed any of these items, please share that information with your care team to update in your medical record.  Health Maintenance Due   Topic Date Due    Colorectal Cancer Screening  Never done    Zoster (Shingles) Vaccine (1 of 2) Never done    Flu Vaccine (1) Never done    FALL RISK ASSESSMENT  12/14/2022    PHQ-2 (once per calendar year)  01/01/2023    Pneumococcal Vaccine (2 - PCV) 12/14/2022

## 2023-03-27 NOTE — PROGRESS NOTES
"SUBJECTIVE:   Sanjeev Blackmon is a 67 year old male who presents for Preventive Visit.    Patient has been advised of split billing requirements and indicates understanding: Yes  Are you in the first 12 months of your Medicare Part B coverage?  No    CAD: calcium score <100 1/2022.  On Atorvastatin 20.  Not yet on asa 81.  No chest pain, SOB.    Had sepsis 2019 unknown origin and resultant afib with RVR.  Resolved with CV and did not recur.  Monitor clear.  Completed 30 days eliquis.  Also UE DVT from IV.     Physical Health:    In general, how would you rate your overall physical health? excellent    Outside of work, how many days during the week do you exercise? 6-7 days/week    Outside of work, approximately how many minutes a day do you exercise?30-45 minutes    If you drink alcohol do you typically have >3 drinks per day or >7 drinks per week? No    Do you usually eat at least 4 servings of fruit and vegetables a day, include whole grains & fiber and avoid regularly eating high fat or \"junk\" foods? NO    Do you have any problems taking medications regularly?  No    Do you have any side effects from medications? none    Needs assistance for the following daily activities: no assistance needed    Which of the following safety concerns are present in your home?  none identified     Hearing impairment: No    In the past 6 months, have you been bothered by leaking of urine? no    Hearing Screening:  Right Ear  4000Hz: pass  2000Hz: pass  1000Hz: pass  500Hz: pass    Left Ear  4000Hz: pass  2000Hz: pass  1000Hz: pass  500Hz: pass    Mental Health:    In general, how would you rate your overall mental or emotional health? excellent  PHQ-2 Score:      Do you feel safe in your environment? Yes    Have you ever done Advance Care Planning? (For example, a Health Directive, POLST, or a discussion with a medical provider or your loved ones about your wishes): Yes, patient states has an Advance Care Planning document and " will bring a copy to the clinic.    Additional concerns to address?  No    Fall risk:  Fallen 2 or more times in the past year?: No  Any fall with injury in the past year?: No    Cognitive Screenin) Repeat 3 items (Leader, Season, Table)    2) Clock draw: NORMAL  3) 3 item recall: Recalls 3 objects  Results: 3 items recalled: COGNITIVE IMPAIRMENT LESS LIKELY  Mini-CogTM Copyright S Lennie. Licensed by the author for use in [facility]; reprinted with permission (mode@[facility]). All rights reserved.      Do you have sleep apnea, excessive snoring or daytime drowsiness?: no        -------------------------------------    Reviewed and updated as needed this visit by clinical staff   Tobacco  Allergies  Meds              Reviewed and updated as needed this visit by Provider                 Social History     Tobacco Use     Smoking status: Never     Smokeless tobacco: Never   Substance Use Topics     Alcohol use: Not Currently     Alcohol/week: 3.3 - 5.0 standard drinks     Types: 4 - 6 Standard drinks or equivalent per week       No flowsheet data found.No flowsheet data found.  Do you have a current opioid prescription? No  Do you use any other controlled substances or medications that are not prescribed by a provider? None                      Current providers sharing in care for this patient include:   Patient Care Team:  Zion Rachel MD as PCP - General (Family Medicine)  Zion Rachel MD as Assigned PCP    The following health maintenance items are reviewed in Epic and correct as of today:  Health Maintenance   Topic Date Due     COLORECTAL CANCER SCREENING  Never done     ZOSTER IMMUNIZATION (1 of 2) Never done     INFLUENZA VACCINE (1) Never done     Pneumococcal Vaccine: 65+ Years (2 - PCV) 2022     MEDICARE ANNUAL WELLNESS VISIT  2024     FALL RISK ASSESSMENT  2024     DTAP/TDAP/TD IMMUNIZATION (3 - Td or Tdap) 2024     LIPID  [id number]      "ADVANCE CARE PLANNING  03/27/2028     HEPATITIS C SCREENING  Completed     PHQ-2 (once per calendar year)  Completed     AORTIC ANEURYSM SCREENING (SYSTEM ASSIGNED)  Completed     COVID-19 Vaccine  Completed     IPV IMMUNIZATION  Aged Out     MENINGITIS IMMUNIZATION  Aged Out     Lab work is in process      ROS:  Constitutional, HEENT, cardiovascular, pulmonary, GI, , musculoskeletal, neuro, skin, endocrine and psych systems are negative, except as otherwise noted.    OBJECTIVE:   BP (!) 144/86   Pulse 63   Ht 1.746 m (5' 8.75\")   Wt 95.4 kg (210 lb 6.4 oz)   SpO2 97%   BMI 31.30 kg/m   Estimated body mass index is 31.3 kg/m  as calculated from the following:    Height as of this encounter: 1.746 m (5' 8.75\").    Weight as of this encounter: 95.4 kg (210 lb 6.4 oz).  EXAM:   GENERAL: healthy, alert and no distress  EYES: Eyes grossly normal to inspection, PERRL and conjunctivae and sclerae normal  HENT: ear canals and TM's normal, nose and mouth without ulcers or lesions  NECK: no adenopathy, no asymmetry, masses, or scars and thyroid normal to palpation  RESP: lungs clear to auscultation - no rales, rhonchi or wheezes  CV: regular rate and rhythm, normal S1 S2, no S3 or S4, no murmur, click or rub, no peripheral edema and peripheral pulses strong  ABDOMEN: soft, nontender, no hepatosplenomegaly, no masses and bowel sounds normal  MS: no gross musculoskeletal defects noted, no edema  SKIN: no suspicious lesions or rashes and keratoses - actinic # 2 - frozen  NEURO: Normal strength and tone, mentation intact and speech normal  PSYCH: mentation appears normal, affect normal/bright    Diagnostic Test Results:  Labs reviewed in Epic  No results found for this or any previous visit (from the past 24 hour(s)).     PROCEDURE:    After the potential risks were discussed, verbal consent was obtained.  Approximately 15 seconds of freeze time was applied to #2 lesions on right face using open spray technique with a 1 mm " margin using 3 freeze-thaw cycles.  There were no immediate complications.  The patient tolerated the procedure well.  Frequent use of vaseline was recommended to promote good healing.  Signs of infection or complications were discussed and understood.      ASSESSMENT / PLAN:   Encounter for Medicare annual wellness exam  - discussed preventative guidelines, healthy diet, exercise and weight management  - VENOUS COLLECTION    Agatston coronary artery calcium score less than 100  On statin.  Add asa.    - atorvastatin (LIPITOR) 20 MG tablet; Take 1 tablet (20 mg) by mouth daily  - VENOUS COLLECTION    Coronary artery disease involving native coronary artery of native heart without angina pectoris  Asymptomatic, see above.  - Comp. Metabolic Panel (14) ([facility])  - Lipid Profile ([facility])  - aspirin (ASA) 81 MG EC tablet; Take 1 tablet (81 mg) by mouth daily  - VENOUS COLLECTION    Abdominal aorta atherosclerosis:  - noted on CT March 2019.  On statin and aspirin    Encounter for immunization  - PNEUMOCOCCAL VACCINE ADMIN    Prostate cancer screening  The natural history of prostate cancer and ongoing controversy regarding screening and potential treatment outcomes of prostate cancer has been discussed with the patient. The meaning of a false positive PSA and a false negative PSA has been discussed, as well as the concept of overdiagnosis.  He indicates understanding of the limitations of this screening test and wishes to proceed with screening PSA testing.   - PSA Serum ([facility])  - VENOUS COLLECTION    Actinic keratosis  Cryo as above  - DESTRUCT PREMALIGNANT LESION, FIRST  - DESTRUCT PREMALIGNANT LESION, 2-14    Elevated blood pressure reading without diagnosis of hypertension  Would like to focus on dietary changes (reduce salt, red meat, alcohol).  If not improved will consider medication.  Will monitor at home        Patient has been advised of split billing requirements and indicates understanding:  "Yes    COUNSELING:  Reviewed preventive health counseling, as reflected in patient instructions       Regular exercise       Healthy diet/nutrition       Prostate cancer screening    Estimated body mass index is 31.3 kg/m  as calculated from the following:    Height as of this encounter: 1.746 m (5' 8.75\").    Weight as of this encounter: 95.4 kg (210 lb 6.4 oz).    Weight management plan: Discussed healthy diet and exercise guidelines    He reports that he has never smoked. He has never used smokeless tobacco.      I have reviewed Opioid Use Disorder and Substance Use Disorder risk factors and made any needed referrals.     Appropriate preventive services were discussed with this patient, including applicable screening as appropriate for cardiovascular disease, diabetes, osteopenia/osteoporosis, and glaucoma.  As appropriate for age/gender, discussed screening for colorectal cancer, prostate cancer, breast cancer, and cervical cancer. Checklist reviewing preventive services available has been given to the patient.    Reviewed patients plan of care and provided an AVS. The Basic Care Plan (routine screening as documented in Health Maintenance) for Sanjeev meets the Care Plan requirement. This Care Plan has been established and reviewed with the Patient.    Counseling Resources:  ATP IV Guidelines  Pooled Cohorts Equation Calculator  Breast Cancer Risk Calculator  BRCA-Related Cancer Risk Assessment: FHS-7 Tool  FRAX Risk Assessment  ICSI Preventive Guidelines  Dietary Guidelines for Americans, 2010  USDA's MyPlate  ASA Prophylaxis  Lung CA Screening    Zion Rachel MD  [facility]  "

## 2023-03-28 LAB
ALBUMIN SERPL-MCNC: 4.6 G/DL (ref 3.8–4.8)
ALBUMIN/GLOB SERPL: 1.6 {RATIO} (ref 1.2–2.2)
ALP SERPL-CCNC: 86 IU/L (ref 44–121)
ALT SERPL-CCNC: 23 IU/L (ref 0–44)
AST SERPL-CCNC: 20 IU/L (ref 0–40)
BILIRUB SERPL-MCNC: 0.7 MG/DL (ref 0–1.2)
BUN SERPL-MCNC: 22 MG/DL (ref 8–27)
BUN/CREATININE RATIO: 18 (ref 10–24)
CALCIUM SERPL-MCNC: 9.9 MG/DL (ref 8.6–10.2)
CHLORIDE SERPLBLD-SCNC: 104 MMOL/L (ref 96–106)
CREAT SERPL-MCNC: 1.19 MG/DL (ref 0.76–1.27)
EGFR: 66 ML/MIN/1.73
GLOBULIN, TOTAL: 2.9 G/DL (ref 1.5–4.5)
GLUCOSE SERPL-MCNC: 104 MG/DL (ref 70–99)
POTASSIUM SERPL-SCNC: 5.3 MMOL/L (ref 3.5–5.2)
PROT SERPL-MCNC: 7.5 G/DL (ref 6–8.5)
PSA NG/ML: 0.3 NG/ML (ref 0–4)
SODIUM SERPL-SCNC: 142 MMOL/L (ref 134–144)
TOTAL CO2: 23 MMOL/L (ref 20–29)

## 2023-05-12 ENCOUNTER — TELEPHONE (OUTPATIENT)
Dept: FAMILY MEDICINE | Facility: CLINIC | Age: 68
End: 2023-05-12

## 2023-05-12 DIAGNOSIS — E87.5 SERUM POTASSIUM ELEVATED: ICD-10-CM

## 2023-05-12 PROCEDURE — 36415 COLL VENOUS BLD VENIPUNCTURE: CPT | Performed by: FAMILY MEDICINE

## 2023-05-12 NOTE — NURSING NOTE
Patient had nova celia shots in nose for procedure this am  Anisha Fernandes MA May 12, 2023 12:53 PM

## 2023-05-12 NOTE — TELEPHONE ENCOUNTER
Left message to call back [facility]  Needs potassium recheck - was elevated  May 12, 2023  Anisha Fernandes MA

## 2023-05-12 NOTE — LETTER
May 15, 2023      Sanjeev Blackmon  [address] [place] [id number]        Dear Sanjeev,     Your potassium is back in the normal range.     If you have any questions, please contact our office.         Sincerely,     Zion Rachel MD     Resulted Orders   Potassium  Serum ([facility])   Result Value Ref Range    Potassium 4.9 3.5 - 5.2 mmol/L    Narrative    Performed at:  01 - [facility] [address], [place], [place]  [id number]  : Alex Aguirre MD, Phone:  [phone]       If you have any questions or concerns, please call the clinic at the number listed above.       Sincerely,      Zion Rachel MD

## 2023-05-13 LAB — POTASSIUM SERPL-SCNC: 4.9 MMOL/L (ref 3.5–5.2)

## 2024-04-09 ENCOUNTER — OFFICE VISIT (OUTPATIENT)
Dept: FAMILY MEDICINE | Facility: CLINIC | Age: 69
End: 2024-04-09

## 2024-04-09 VITALS
BODY MASS INDEX: 31.16 KG/M2 | DIASTOLIC BLOOD PRESSURE: 104 MMHG | WEIGHT: 210.4 LBS | HEART RATE: 75 BPM | HEIGHT: 69 IN | OXYGEN SATURATION: 99 % | SYSTOLIC BLOOD PRESSURE: 167 MMHG

## 2024-04-09 DIAGNOSIS — I70.0 ATHEROSCLEROSIS OF ABDOMINAL AORTA (H): ICD-10-CM

## 2024-04-09 DIAGNOSIS — I25.10 CORONARY ARTERY DISEASE INVOLVING NATIVE CORONARY ARTERY OF NATIVE HEART WITHOUT ANGINA PECTORIS: ICD-10-CM

## 2024-04-09 DIAGNOSIS — R93.1 AGATSTON CORONARY ARTERY CALCIUM SCORE LESS THAN 100: ICD-10-CM

## 2024-04-09 DIAGNOSIS — L57.0 ACTINIC KERATOSIS: ICD-10-CM

## 2024-04-09 DIAGNOSIS — R03.0 ELEVATED BLOOD PRESSURE READING WITHOUT DIAGNOSIS OF HYPERTENSION: ICD-10-CM

## 2024-04-09 DIAGNOSIS — Z00.00 ENCOUNTER FOR MEDICARE ANNUAL WELLNESS EXAM: Primary | ICD-10-CM

## 2024-04-09 DIAGNOSIS — Z12.5 PROSTATE CANCER SCREENING: ICD-10-CM

## 2024-04-09 LAB
ALBUMIN SERPL BCG-MCNC: 4.2 G/DL (ref 3.5–5.2)
ALP SERPL-CCNC: 90 U/L (ref 40–150)
ALT SERPL W P-5'-P-CCNC: 32 U/L (ref 0–70)
ANION GAP SERPL CALCULATED.3IONS-SCNC: 12 MMOL/L (ref 7–15)
AST SERPL W P-5'-P-CCNC: 26 U/L (ref 0–45)
BILIRUB SERPL-MCNC: 0.7 MG/DL
BUN SERPL-MCNC: 29.2 MG/DL (ref 8–23)
CALCIUM SERPL-MCNC: 9.8 MG/DL (ref 8.8–10.2)
CHLORIDE SERPL-SCNC: 105 MMOL/L (ref 98–107)
CHOLESTEROL: 170 MG/DL (ref 100–199)
CREAT SERPL-MCNC: 1.3 MG/DL (ref 0.67–1.17)
DEPRECATED HCO3 PLAS-SCNC: 23 MMOL/L (ref 22–29)
EGFRCR SERPLBLD CKD-EPI 2021: 59 ML/MIN/1.73M2
FASTING?: YES
GLUCOSE SERPL-MCNC: 94 MG/DL (ref 70–99)
HDL (RMG): 66 MG/DL (ref 40–?)
LDL CALCULATED (RMG): 92 MG/DL (ref 0–130)
POTASSIUM SERPL-SCNC: 4.7 MMOL/L (ref 3.4–5.3)
PROT SERPL-MCNC: 8.1 G/DL (ref 6.4–8.3)
PSA SERPL DL<=0.01 NG/ML-MCNC: 0.37 NG/ML (ref 0–6.5)
SODIUM SERPL-SCNC: 140 MMOL/L (ref 135–145)
TRIGLYCERIDES (RMG): 58 MG/DL (ref 0–149)

## 2024-04-09 PROCEDURE — G0103 PSA SCREENING: HCPCS | Mod: 90 | Performed by: FAMILY MEDICINE

## 2024-04-09 PROCEDURE — 17003 DESTRUCT PREMALG LES 2-14: CPT | Mod: 59 | Performed by: FAMILY MEDICINE

## 2024-04-09 PROCEDURE — 36415 COLL VENOUS BLD VENIPUNCTURE: CPT | Performed by: FAMILY MEDICINE

## 2024-04-09 PROCEDURE — G0439 PPPS, SUBSEQ VISIT: HCPCS | Mod: 25 | Performed by: FAMILY MEDICINE

## 2024-04-09 PROCEDURE — 99214 OFFICE O/P EST MOD 30 MIN: CPT | Mod: 25 | Performed by: FAMILY MEDICINE

## 2024-04-09 PROCEDURE — 17000 DESTRUCT PREMALG LESION: CPT | Performed by: FAMILY MEDICINE

## 2024-04-09 PROCEDURE — 80061 LIPID PANEL: CPT | Mod: QW | Performed by: FAMILY MEDICINE

## 2024-04-09 PROCEDURE — 80053 COMPREHEN METABOLIC PANEL: CPT | Mod: 90 | Performed by: FAMILY MEDICINE

## 2024-04-09 RX ORDER — ROSUVASTATIN CALCIUM 20 MG/1
20 TABLET, COATED ORAL DAILY
Qty: 90 TABLET | Refills: 3 | Status: SHIPPED | OUTPATIENT
Start: 2024-04-09 | End: 2024-04-17

## 2024-04-09 SDOH — HEALTH STABILITY: PHYSICAL HEALTH: ON AVERAGE, HOW MANY MINUTES DO YOU ENGAGE IN EXERCISE AT THIS LEVEL?: 40 MIN

## 2024-04-09 SDOH — HEALTH STABILITY: PHYSICAL HEALTH: ON AVERAGE, HOW MANY DAYS PER WEEK DO YOU ENGAGE IN MODERATE TO STRENUOUS EXERCISE (LIKE A BRISK WALK)?: 7 DAYS

## 2024-04-09 ASSESSMENT — SOCIAL DETERMINANTS OF HEALTH (SDOH): HOW OFTEN DO YOU GET TOGETHER WITH FRIENDS OR RELATIVES?: THREE TIMES A WEEK

## 2024-04-09 NOTE — PROGRESS NOTES
"Preventive Care Visit  [facility]  Zion Rachel MD, Family Medicine  Apr 9, 2024      Assessment & Plan     Encounter for Medicare annual wellness exam  - discussed preventative guidelines, healthy diet, exercise and weight management    Atherosclerosis of abdominal aorta (H24)  Noted on CT 2019.  On statin and asa.  Asymptomatic.    Coronary artery disease involving native coronary artery of native heart without angina pectoris  Asymptomatic.    --optimize weight and BP  --LDL not at goal.  Will adjust statin and recheck in 3 months  - Lipid Profile ([facility])    Agatston coronary artery calcium score less than 100  See above  - Lipid Profile ([facility])    Prostate cancer screening  The natural history of prostate cancer and ongoing controversy regarding screening and potential treatment outcomes of prostate cancer has been discussed with the patient. The meaning of a false positive PSA and a false negative PSA has been discussed, as well as the concept of overdiagnosis.  He indicates understanding of the limitations of this screening test and wishes to proceed with screening PSA testing.   - VENOUS COLLECTION  - PSA, screen; Future  - PSA, screen    Elevated blood pressure reading without diagnosis of hypertension  Reports home readings 130s/80s.  Discussed optimal BP  --lifestyle changes. Recheck if not improving  --low threshold to start med  --decrease alcohol and salt  - VENOUS COLLECTION  - Comprehensive metabolic panel; Future  - Comprehensive metabolic panel    Actinic keratosis  See procedure note  Recommend topical Efudex next winter  - DESTRUCT PREMALIGNANT LESION, FIRST  - DESTRUCT PREMALIGNANT LESION, 2-14    Patient has been advised of split billing requirements and indicates understanding: Yes          BMI  Estimated body mass index is 31.3 kg/m  as calculated from the following:    Height as of this encounter: 1.746 m (5' 8.75\").    Weight as of this encounter: 95.4 kg (210 lb 6.4 oz). "   Weight management plan: Discussed healthy diet and exercise guidelines    Counseling  Appropriate preventive services were discussed with this patient, including applicable screening as appropriate for fall prevention, nutrition, physical activity, Tobacco-use cessation, weight loss and cognition.  Checklist reviewing preventive services available has been given to the patient.  Reviewed patient's diet, addressing concerns and/or questions.       See Patient Instructions    No follow-ups on file.    Gage Pace is a 68 year old, presenting for the following:          Health Care Directive  Patient does not have a Health Care Directive or Living Will: Discussed advance care planning with patient; information given to patient to review.    HPI    CAD: calcium score <100 1/2022.  On Atorvastatin 20 and ASA. No chest pain, SOB.     Had sepsis 2019 unknown origin and resultant afib with RVR.  Resolved with CV and did not recur.  Monitor clear.  Completed 30 days eliquis.  Also UE DVT from IV.             4/9/2024   General Health   How would you rate your overall physical health? Good   Feel stress (tense, anxious, or unable to sleep) Not at all         4/9/2024   Nutrition   Diet: Regular (no restrictions)         4/9/2024   Exercise   Days per week of moderate/strenous exercise 7 days   Average minutes spent exercising at this level 40 min         4/9/2024   Social Factors   Frequency of gathering with friends or relatives Three times a week   Worry food won't last until get money to buy more No   Food not last or not have enough money for food? No   Do you have housing?  Yes   Are you worried about losing your housing? No   Lack of transportation? No   Unable to get utilities (heat,electricity)? No         4/9/2024   Fall Risk   Fallen 2 or more times in the past year? No   Trouble with walking or balance? No          4/9/2024   Activities of Daily Living- Home Safety   Needs help with the following daily  activites None of the above   Safety concerns in the home None of the above         4/9/2024   Dental   Dentist two times every year? Yes         4/9/2024   Hearing Screening   Hearing concerns? None of the above     Hearing Screening:  Right Ear  4000Hz: pass  2000Hz: pass  1000Hz: pass  500Hz: pass    Left Ear  4000Hz: pass  2000Hz: pass  1000Hz: pass  500Hz: pass         4/9/2024   Driving Risk Screening   Patient/family members have concerns about driving No         4/9/2024   General Alertness/Fatigue Screening   Have you been more tired than usual lately? No         4/9/2024   Urinary Incontinence Screening   Bothered by leaking urine in past 6 months No         4/9/2024   TB Screening   Were you born outside of the [place]? No     Today's PHQ-2 Score:       4/9/2024     8:50 AM   PHQ-2 ( 1999 [employer])   Q1: Little interest or pleasure in doing things 0   Q2: Feeling down, depressed or hopeless 0   PHQ-2 Score 0         4/9/2024   Substance Use   Alcohol more than 3/day or more than 7/wk No   Do you have a current opioid prescription? No   How severe/bad is pain from 1 to 10? 0/10 (No Pain)   Do you use any other substances recreationally? (!) ALCOHOL     Social History     Tobacco Use    Smoking status: Never    Smokeless tobacco: Never   Substance Use Topics    Alcohol use: Not Currently     Alcohol/week: 3.3 - 5.0 standard drinks of alcohol     Types: 4 - 6 Standard drinks or equivalent per week           4/9/2024   AAA Screening   Family history of Abdominal Aortic Aneurysm (AAA)? Unsure   ASCVD Risk   The 10-year ASCVD risk score (Kandi STEVENS, et al., 2019) is: 18.8%    Values used to calculate the score:      Age: 68 years      Sex: Male      Is Non- : No      Diabetic: No      Tobacco smoker: No      Systolic Blood Pressure: 155 mmHg      Is BP treated: No      HDL Cholesterol: 73 mg/dl      Total Cholesterol: 162 mg/dl            Reviewed and updated as needed this visit by  "Provider                    Lab work is in process  Current providers sharing in care for this patient include:  Patient Care Team:  Zion Rachel MD as PCP - General (Family Medicine)  Zion Rachel MD as Assigned PCP    The following health maintenance items are reviewed in Epic and correct as of today:  Health Maintenance   Topic Date Due    ZOSTER IMMUNIZATION (1 of 2) Never done    RSV VACCINE (Pregnancy & 60+) (1 - 1-dose 60+ series) Never done    INFLUENZA VACCINE (1) Never done    LIPID  03/27/2024    DTAP/TDAP/TD IMMUNIZATION (3 - Td or Tdap) 05/30/2024    COLORECTAL CANCER SCREENING  01/02/2025    MEDICARE ANNUAL WELLNESS VISIT  04/09/2025    FALL RISK ASSESSMENT  04/09/2025    GLUCOSE  03/27/2026    ADVANCE CARE PLANNING  03/27/2028    HEPATITIS C SCREENING  Completed    PHQ-2 (once per calendar year)  Completed    Pneumococcal Vaccine: 65+ Years  Completed    COVID-19 Vaccine  Completed    IPV IMMUNIZATION  Aged Out    HPV IMMUNIZATION  Aged Out    MENINGITIS IMMUNIZATION  Aged Out    RSV MONOCLONAL ANTIBODY  Aged Out         Review of Systems  Constitutional, HEENT, cardiovascular, pulmonary, GI, , musculoskeletal, neuro, skin, endocrine and psych systems are negative, except as otherwise noted.     Objective    Exam  BP (!) 155/104   Pulse 75   Ht 1.746 m (5' 8.75\")   Wt 95.4 kg (210 lb 6.4 oz)   SpO2 99%   BMI 31.30 kg/m     Estimated body mass index is 31.3 kg/m  as calculated from the following:    Height as of this encounter: 1.746 m (5' 8.75\").    Weight as of this encounter: 95.4 kg (210 lb 6.4 oz).    Physical Exam  GENERAL: alert and no distress  EYES: Eyes grossly normal to inspection, PERRL and conjunctivae and sclerae normal  HENT: ear canals and TM's normal, nose and mouth without ulcers or lesions  NECK: no adenopathy, no asymmetry, masses, or scars  RESP: lungs clear to auscultation - no rales, rhonchi or wheezes  CV: regular rate and rhythm, normal S1 S2, no S3 " or S4, no murmur, click or rub, no peripheral edema  ABDOMEN: soft, nontender, no hepatosplenomegaly, no masses and bowel sounds normal  MS: no gross musculoskeletal defects noted, no edema  SKIN: keratoses - actinic # 6 - frozen on face  NEURO: Normal strength and tone, mentation intact and speech normal  PSYCH: mentation appears normal, affect normal/bright        4/9/2024   Mini Cog   Clock Draw Score 2 Normal   3 Item Recall 3 objects recalled   Mini Cog Total Score 5              Signed Electronically by: Zion Rachel MD

## 2024-04-09 NOTE — PATIENT INSTRUCTIONS
Preventive Care Advice   This is general advice given by our system to help you stay healthy. However, your care team may have specific advice just for you. Please talk to your care team about your preventive care needs.  Nutrition  Eat 5 or more servings of fruits and vegetables each day.  Try wheat bread, brown rice and whole grain pasta (instead of white bread, rice, and pasta).  Get enough calcium and vitamin D. Check the label on foods and aim for 100% of the RDA (recommended daily allowance).  Lifestyle  Exercise at least 150 minutes each week   (30 minutes a day, 5 days a week).  Do muscle strengthening activities 2 days a week. These help control your weight and prevent disease.  No smoking.  Wear sunscreen to prevent skin cancer.  Have a dental exam and cleaning every 6 months.  Yearly exams  See your health care team every year to talk about:  Any changes in your health.  Any medicines your care team has prescribed.  Preventive care, family planning, and ways to prevent chronic diseases.  Shots (vaccines)   HPV shots (up to age 26), if you've never had them before.  Hepatitis B shots (up to age 59), if you've never had them before.  COVID-19 shot: Get this shot when it's due.  Flu shot: Get a flu shot every year.  Tetanus shot: Get a tetanus shot every 10 years.  Pneumococcal, hepatitis A, and RSV shots: Ask your care team if you need these based on your risk.  Shingles shot (for age 50 and up).  General health tests  Diabetes screening:  Starting at age 35, Get screened for diabetes at least every 3 years.  If you are younger than age 35, ask your care team if you should be screened for diabetes.  Cholesterol test: At age 39, start having a cholesterol test every 5 years, or more often if advised.  Bone density scan (DEXA): At age 50, ask your care team if you should have this scan for osteoporosis (brittle bones).  Hepatitis C: Get tested at least once in your life.  STIs (sexually transmitted  infections)  Before age 24: Ask your care team if you should be screened for STIs.  After age 24: Get screened for STIs if you're at risk. You are at risk for STIs (including HIV) if:  You are sexually active with more than one person.  You don't use condoms every time.  You or a partner was diagnosed with a sexually transmitted infection.  If you are at risk for HIV, ask about PrEP medicine to prevent HIV.  Get tested for HIV at least once in your life, whether you are at risk for HIV or not.  Cancer screening tests  Cervical cancer screening: If you have a cervix, begin getting regular cervical cancer screening tests at age 21. Most people who have regular screenings with normal results can stop after age 65. Talk about this with your provider.  Breast cancer scan (mammogram): If you've ever had breasts, begin having regular mammograms starting at age 40. This is a scan to check for breast cancer.  Colon cancer screening: It is important to start screening for colon cancer at age 45.  Have a colonoscopy test every 10 years (or more often if you're at risk) Or, ask your provider about stool tests like a FIT test every year or Cologuard test every 3 years.  To learn more about your testing options, visit: https://www.[employer]/344851.pdf.  For help making a decision, visit: https://bit.ly/vd91738.  Prostate cancer screening test: If you have a prostate and are age 55 to 69, ask your provider if you would benefit from a yearly prostate cancer screening test.  Lung cancer screening: If you are a current or former smoker age 50 to 80, ask your care team if ongoing lung cancer screenings are right for you.  For informational purposes only. Not to replace the advice of your health care provider. Copyright   2023 [employer]. All rights reserved. Clinically reviewed by the [facility]. Attributor [id number] - REV 01/24.

## 2024-04-09 NOTE — LETTER
April 17, 2024        Sanjeev Blackmon  [address] [facility] [address]        Dear Sanjeev,     Your liver function and electrolytes are normal.  Your kidney function is very mildly reduced.  This is not a significant concern but we will continue to monitor this.  It is important maintain a normal BP.  Please follow up to ensure normal readings as we discussed.     Your lipids are fairly normal; however, with known coronary artery disease it is ideal to drive your LDL below 70.  I would like you to increase your Rosuvastatin dose.  I will send a new prescription and we should recheck this in 3 months.     Your PSA (prostate cancer blood test)  is normal.       It was very nice seeing you.  If you have any questions, please contact our office.        Sincerely,     Zion Rachel MD     Resulted Orders   Lipid Profile ([facility])   Result Value Ref Range    Cholesterol 170 100 - 199 mg/dL    HDL 66 40 mg/dL    Triglycerides 58 0 - 149 mg/dL    LDL CALCULATED ([facility]) 92 0 - 130 mg/dL    Patient Fasting? Yes    PSA, screen   Result Value Ref Range    Prostate Specific Antigen Screen 0.37 0.00 - 6.50 ng/mL    Narrative    This result is obtained using the Roche Elecsys total PSA method on the aylin e801 immunoassay analyzer. Results obtained with different assay methods or kits cannot be used interchangeably.   Comprehensive metabolic panel   Result Value Ref Range    Sodium 140 135 - 145 mmol/L      Comment:      Reference intervals for this test were updated on 09/26/2023 to more accurately reflect our healthy population. There may be differences in the flagging of prior results with similar values performed with this method. Interpretation of those prior results can be made in the context of the updated reference intervals.     Potassium 4.7 3.4 - 5.3 mmol/L    Carbon Dioxide (CO2) 23 22 - 29 mmol/L    Anion Gap 12 7 - 15 mmol/L    Urea Nitrogen 29.2 (H) 8.0 - 23.0 mg/dL    Creatinine 1.30 (H) 0.67 - 1.17 mg/dL     GFR Estimate 59 (L) >60 mL/min/1.73m2    Calcium 9.8 8.8 - 10.2 mg/dL    Chloride 105 98 - 107 mmol/L    Glucose 94 70 - 99 mg/dL    Alkaline Phosphatase 90 40 - 150 U/L      Comment:      Reference intervals for this test were updated on 11/14/2023 to more accurately reflect our healthy population. There may be differences in the flagging of prior results with similar values performed with this method. Interpretation of those prior results can be made in the context of the updated reference intervals.    AST 26 0 - 45 U/L      Comment:      Reference intervals for this test were updated on 6/12/2023 to more accurately reflect our healthy population. There may be differences in the flagging of prior results with similar values performed with this method. Interpretation of those prior results can be made in the context of the updated reference intervals.    ALT 32 0 - 70 U/L      Comment:      Reference intervals for this test were updated on 6/12/2023 to more accurately reflect our healthy population. There may be differences in the flagging of prior results with similar values performed with this method. Interpretation of those prior results can be made in the context of the updated reference intervals.      Protein Total 8.1 6.4 - 8.3 g/dL    Albumin 4.2 3.5 - 5.2 g/dL    Bilirubin Total 0.7 <=1.2 mg/dL       If you have any questions or concerns, please call the clinic at the number listed above.       Sincerely,      Zion Rachel MD

## 2024-04-17 DIAGNOSIS — R93.1 AGATSTON CORONARY ARTERY CALCIUM SCORE LESS THAN 100: ICD-10-CM

## 2024-04-17 DIAGNOSIS — I25.10 CORONARY ARTERY DISEASE INVOLVING NATIVE CORONARY ARTERY OF NATIVE HEART WITHOUT ANGINA PECTORIS: ICD-10-CM

## 2024-04-17 DIAGNOSIS — I70.0 ATHEROSCLEROSIS OF ABDOMINAL AORTA (H): ICD-10-CM

## 2024-04-17 RX ORDER — ROSUVASTATIN CALCIUM 40 MG/1
40 TABLET, COATED ORAL DAILY
Qty: 90 TABLET | Refills: 3 | Status: SHIPPED | OUTPATIENT
Start: 2024-04-17

## 2024-05-07 ENCOUNTER — TELEPHONE (OUTPATIENT)
Dept: FAMILY MEDICINE | Facility: CLINIC | Age: 69
End: 2024-05-07

## 2024-05-07 DIAGNOSIS — R03.0 ELEVATED BLOOD PRESSURE READING WITHOUT DIAGNOSIS OF HYPERTENSION: Primary | ICD-10-CM

## 2024-05-07 RX ORDER — IRBESARTAN 75 MG/1
75 TABLET ORAL AT BEDTIME
Qty: 30 TABLET | Refills: 0 | Status: SHIPPED | OUTPATIENT
Start: 2024-05-07 | End: 2024-06-04

## 2024-05-07 NOTE — TELEPHONE ENCOUNTER
----- Message from Zion Rachel MD sent at 5/7/2024  8:05 AM CDT -----  Yes, I think a low dose will be enough to get him into an optimal range.  Recommend Irbesartan 75 once daily and follow up in 3-4 weeks for recheck with BMP  ----- Message -----  From: Denita Nicholas LPN  Sent: 5/6/2024   5:30 PM CDT  To: MD Alexx Wilkins called with an update on his BP readings at home. He saw you last month 4/9 for a CPX. He has been taking his BP in the mornings between 6a-8a and they have run 128//93. I am happy to call and talk with him.  Denita

## 2024-05-07 NOTE — TELEPHONE ENCOUNTER
Called and spoke with patient wife regarding BP medication. Prescription sent to pharmacy. Asked that patient call and schedule office visit in 3-4 weeks for BP check and labs. Advised that patient/wife call clinic with questions or concerns. Denita Clarke

## 2024-06-04 DIAGNOSIS — R03.0 ELEVATED BLOOD PRESSURE READING WITHOUT DIAGNOSIS OF HYPERTENSION: ICD-10-CM

## 2024-06-04 RX ORDER — IRBESARTAN 75 MG/1
TABLET ORAL
Qty: 30 TABLET | Refills: 0 | Status: SHIPPED | OUTPATIENT
Start: 2024-06-04 | End: 2024-06-05

## 2024-06-04 NOTE — TELEPHONE ENCOUNTER
Med: Irbesartan         LOV (related): 4/9/24 last cpx    Last Lab: 4/9/24      Due for F/U around: 3 months bp check    Next Appt: 6/5/24 (bp/labs) with Zion Rachel           BP Readings from Last 3 Encounters:   04/09/24 (!) 167/104   03/27/23 136/86   01/11/22 132/82       Last Comprehensive Metabolic Panel:  Lab Results   Component Value Date     04/09/2024    POTASSIUM 4.7 04/09/2024    CHLORIDE 105 04/09/2024    CO2 23 04/09/2024    ANIONGAP 12 04/09/2024    GLC 94 04/09/2024    BUN 29.2 (H) 04/09/2024    CR 1.30 (H) 04/09/2024    GFRESTIMATED 59 (L) 04/09/2024    LEANNA 9.8 04/09/2024

## 2024-06-05 ENCOUNTER — OFFICE VISIT (OUTPATIENT)
Dept: FAMILY MEDICINE | Facility: CLINIC | Age: 69
End: 2024-06-05

## 2024-06-05 VITALS
BODY MASS INDEX: 29.93 KG/M2 | WEIGHT: 201.2 LBS | OXYGEN SATURATION: 95 % | HEART RATE: 72 BPM | DIASTOLIC BLOOD PRESSURE: 80 MMHG | SYSTOLIC BLOOD PRESSURE: 121 MMHG

## 2024-06-05 DIAGNOSIS — I10 PRIMARY HYPERTENSION: Primary | ICD-10-CM

## 2024-06-05 LAB
% GRANULOCYTES: 64.3 % (ref 42.2–75.2)
ANION GAP SERPL CALCULATED.3IONS-SCNC: 10 MMOL/L (ref 7–15)
BUN SERPL-MCNC: 43.7 MG/DL (ref 8–23)
CALCIUM SERPL-MCNC: 9.7 MG/DL (ref 8.8–10.2)
CHLORIDE SERPL-SCNC: 108 MMOL/L (ref 98–107)
CREAT SERPL-MCNC: 1.33 MG/DL (ref 0.67–1.17)
DEPRECATED HCO3 PLAS-SCNC: 23 MMOL/L (ref 22–29)
EGFRCR SERPLBLD CKD-EPI 2021: 58 ML/MIN/1.73M2
FASTING STATUS PATIENT QL REPORTED: ABNORMAL
GLUCOSE SERPL-MCNC: 108 MG/DL (ref 70–99)
HCT VFR BLD AUTO: 42.6 % (ref 39–51)
HEMOGLOBIN: 14.2 G/DL (ref 13.4–17.5)
LYMPHOCYTES NFR BLD AUTO: 26.9 % (ref 20.5–51.1)
MCH RBC QN AUTO: 28.9 PG (ref 27–31)
MCHC RBC AUTO-ENTMCNC: 33.5 G/DL (ref 33–37)
MCV RBC AUTO: 86.4 FL (ref 80–100)
MONOCYTES NFR BLD AUTO: 8.8 % (ref 1.7–9.3)
PLATELET # BLD AUTO: 190 K/UL (ref 140–450)
POTASSIUM SERPL-SCNC: 4.3 MMOL/L (ref 3.4–5.3)
RBC # BLD AUTO: 4.93 X10/CMM (ref 4.2–5.9)
SODIUM SERPL-SCNC: 141 MMOL/L (ref 135–145)
WBC # BLD AUTO: 6.9 X10/CMM (ref 3.8–11)

## 2024-06-05 PROCEDURE — G2211 COMPLEX E/M VISIT ADD ON: HCPCS | Performed by: FAMILY MEDICINE

## 2024-06-05 PROCEDURE — 80048 BASIC METABOLIC PNL TOTAL CA: CPT | Mod: 90 | Performed by: FAMILY MEDICINE

## 2024-06-05 PROCEDURE — 99213 OFFICE O/P EST LOW 20 MIN: CPT | Performed by: FAMILY MEDICINE

## 2024-06-05 PROCEDURE — 36415 COLL VENOUS BLD VENIPUNCTURE: CPT | Performed by: FAMILY MEDICINE

## 2024-06-05 PROCEDURE — 85025 COMPLETE CBC W/AUTO DIFF WBC: CPT | Performed by: FAMILY MEDICINE

## 2024-06-05 RX ORDER — IRBESARTAN 75 MG/1
75 TABLET ORAL DAILY
Qty: 90 TABLET | Refills: 3 | Status: SHIPPED | OUTPATIENT
Start: 2024-06-05

## 2024-06-05 NOTE — PROGRESS NOTES
Gage Pace is a 68 year old patient who presents to clinic for follow up.  Started irbesartan last month.  Tolerating well.  Brings home readings and about 95% are at goal.  No other concerns.        Review of Systems   Constitutional, HEENT, cardiovascular, pulmonary, GI, , musculoskeletal, neuro, skin, endocrine and psych systems are negative, except as otherwise noted.      Objective    /80   Pulse 72   Wt 91.3 kg (201 lb 3.2 oz)   SpO2 95%   BMI 29.93 kg/m      General: Well appearing, NAD  Psych: normal mood and affect        Results for orders placed or performed in visit on 06/05/24 (from the past 24 hour(s))   CBC with Diff/Plt ([facility])   Result Value Ref Range    WBC x10/cmm 6.9 3.8 - 11.0 x10/cmm    % Lymphocytes 26.9 20.5 - 51.1 %    % Monocytes 8.8 1.7 - 9.3 %    % Granulocytes 64.3 42.2 - 75.2 %    RBC x10/cmm 4.93 4.2 - 5.9 x10/cmm    Hemoglobin 14.2 13.4 - 17.5 g/dl    Hematocrit 42.6 39 - 51 %    MCV 86.4 80 - 100 fL    MCH 28.9 27.0 - 31.0 pg    MCHC 33.5 33.0 - 37.0 g/dL    Platelet Count 190 140 - 450 K/uL       Primary hypertension  stable/controlled. Cont current medication(s) and treatment    - Basic metabolic panel; Future  - CBC with Diff/Plt ([facility])  - VENOUS COLLECTION  - Basic metabolic panel

## 2024-06-05 NOTE — LETTER
June 14, 2024      Sanjeev Blackmon  [address] [facility] [address]        Dear Sanjeev,     Your kidney function remains mildly reduced but is stable.  We will continue to monitor this.  It is important to optimize your blood pressure and blood sugars to preserve kidney function.  In addition, stay well hydrated and avoid NSAIDs (Ibuprofen, Naproxen, etc).     Your blood counts (white blood cells, red blood cells and platelets) are all normal.       It was very nice seeing you.  If you have any questions, please contact our office.      Sincerely,     Zion Rachel MD     Resulted Orders   CBC with Diff/Plt ([facility])   Result Value Ref Range    WBC x10/cmm 6.9 3.8 - 11.0 x10/cmm    % Lymphocytes 26.9 20.5 - 51.1 %    % Monocytes 8.8 1.7 - 9.3 %    % Granulocytes 64.3 42.2 - 75.2 %    RBC x10/cmm 4.93 4.2 - 5.9 x10/cmm    Hemoglobin 14.2 13.4 - 17.5 g/dl    Hematocrit 42.6 39 - 51 %    MCV 86.4 80 - 100 fL    MCH 28.9 27.0 - 31.0 pg    MCHC 33.5 33.0 - 37.0 g/dL    Platelet Count 190 140 - 450 K/uL   Basic metabolic panel   Result Value Ref Range    Sodium 141 135 - 145 mmol/L      Comment:      Reference intervals for this test were updated on 09/26/2023 to more accurately reflect our healthy population. There may be differences in the flagging of prior results with similar values performed with this method. Interpretation of those prior results can be made in the context of the updated reference intervals.     Potassium 4.3 3.4 - 5.3 mmol/L    Chloride 108 (H) 98 - 107 mmol/L    Carbon Dioxide (CO2) 23 22 - 29 mmol/L    Anion Gap 10 7 - 15 mmol/L    Urea Nitrogen 43.7 (H) 8.0 - 23.0 mg/dL    Creatinine 1.33 (H) 0.67 - 1.17 mg/dL    GFR Estimate 58 (L) >60 mL/min/1.73m2    Calcium 9.7 8.8 - 10.2 mg/dL    Glucose 108 (H) 70 - 99 mg/dL    Patient Fasting > 8hrs? Unknown

## 2024-09-20 PROCEDURE — 91320 SARSCV2 VAC 30MCG TRS-SUC IM: CPT

## 2024-09-20 PROCEDURE — 90480 ADMN SARSCOV2 VAC 1/ONLY CMP: CPT

## 2025-01-20 DIAGNOSIS — L57.0 ACTINIC KERATOSIS: Primary | ICD-10-CM

## 2025-01-20 RX ORDER — FLUOROURACIL 50 MG/G
CREAM TOPICAL
Qty: 40 G | Refills: 0 | Status: SHIPPED | OUTPATIENT
Start: 2025-01-20

## 2025-01-23 NOTE — PROGRESS NOTES
Patient called 1/20/25 following up on conversation with Dr. Rachel at his 4/2024 annual visit recommending patient call this winter to get cream prescribed to treat actinic keratosis.   Plan: Dr. Rachel sent rx to pharmacy for generic Efudex to use BID for 2-3 weeks. Nurse called patient and explained use and mailed hand out to patient with detailed instructions on use. Patient to call if questions or concerns during treatment.   Also reminded patient due for AWV in April. Patient states will call tomorrow to schedule.  Silvia Abbott RN    [email or web address]    no

## 2025-04-06 DIAGNOSIS — I25.10 CORONARY ARTERY DISEASE INVOLVING NATIVE CORONARY ARTERY OF NATIVE HEART WITHOUT ANGINA PECTORIS: ICD-10-CM

## 2025-04-06 DIAGNOSIS — R93.1 AGATSTON CORONARY ARTERY CALCIUM SCORE LESS THAN 100: ICD-10-CM

## 2025-04-06 DIAGNOSIS — I70.0 ATHEROSCLEROSIS OF ABDOMINAL AORTA: ICD-10-CM

## 2025-04-07 RX ORDER — ROSUVASTATIN CALCIUM 40 MG/1
40 TABLET, COATED ORAL DAILY
Qty: 90 TABLET | Refills: 3 | Status: SHIPPED | OUTPATIENT
Start: 2025-04-07

## 2025-04-07 NOTE — TELEPHONE ENCOUNTER
"Med: Rosuvastatin      LOV (related): 4/9/24 (AWV)    Last Lab: 6/5/24      Due for F/U around: due now for CPX/AWV    Next Appt: 4/23/25 (AWV) with Zion Rachel           BP Readings from Last 3 Encounters:   06/05/24 121/80   04/09/24 (!) 167/104   03/27/23 136/86       Last Comprehensive Metabolic Panel:  Lab Results   Component Value Date     06/05/2024    POTASSIUM 4.3 06/05/2024    CHLORIDE 108 (H) 06/05/2024    CO2 23 06/05/2024    ANIONGAP 10 06/05/2024     (H) 06/05/2024    BUN 43.7 (H) 06/05/2024    CR 1.33 (H) 06/05/2024    GFRESTIMATED 58 (L) 06/05/2024    LEANNA 9.7 06/05/2024             Cholesterol   Date Value Ref Range Status   04/09/2024 170 100 - 199 mg/dL Final   03/27/2023 162 100 - 199 mg/dl Final   12/14/2021 222 (H) 100 - 199 mg/dL Final   02/27/2015 233 (H) 100 - 199 mg/dL Final     HDL Cholesterol   Date Value Ref Range Status   12/14/2021 69 >39 mg/dL Final   02/27/2015 78 >39 mg/dL Final     Comment:     According to ATP-III Guidelines, HDL-C >59 mg/dL is considered a  negative risk factor for CHD.       HDL   Date Value Ref Range Status   04/09/2024 66 >=40 mg/dL Final   03/27/2023 73 >=40 mg/dl Final     LDL Cholesterol Calculated   Date Value Ref Range Status   12/14/2021 138 (H) 0 - 99 mg/dL Final   02/27/2015 142 (H) 0 - 99 mg/dL Final     LDL CALCULATED ([facility])   Date Value Ref Range Status   04/09/2024 92 0 - 130 mg/dL Final   03/27/2023 71 0 - 130 mg/dl Final     Triglycerides   Date Value Ref Range Status   04/09/2024 58 0 - 149 mg/dL Final   03/27/2023 94 0 - 149 mg/dl Final   12/14/2021 85 0 - 149 mg/dL Final   02/27/2015 66 0 - 149 mg/dL Final     No results found for: \"CHOLHDLRATIO\"    "

## 2025-04-23 ENCOUNTER — OFFICE VISIT (OUTPATIENT)
Dept: FAMILY MEDICINE | Facility: CLINIC | Age: 70
End: 2025-04-23

## 2025-04-23 VITALS
HEIGHT: 69 IN | OXYGEN SATURATION: 100 % | HEART RATE: 69 BPM | DIASTOLIC BLOOD PRESSURE: 100 MMHG | WEIGHT: 211.4 LBS | SYSTOLIC BLOOD PRESSURE: 150 MMHG | BODY MASS INDEX: 31.31 KG/M2

## 2025-04-23 DIAGNOSIS — Z00.00 ENCOUNTER FOR PREVENTIVE HEALTH EXAMINATION: Primary | ICD-10-CM

## 2025-04-23 DIAGNOSIS — Z12.5 PROSTATE CANCER SCREENING: ICD-10-CM

## 2025-04-23 DIAGNOSIS — I50.22 CHRONIC HEART FAILURE WITH MILDLY REDUCED EJECTION FRACTION (HFMREF, 41-49%) (H): ICD-10-CM

## 2025-04-23 DIAGNOSIS — Z00.00 ENCOUNTER FOR MEDICARE ANNUAL WELLNESS EXAM: ICD-10-CM

## 2025-04-23 DIAGNOSIS — I25.10 CORONARY ARTERY DISEASE INVOLVING NATIVE CORONARY ARTERY OF NATIVE HEART WITHOUT ANGINA PECTORIS: ICD-10-CM

## 2025-04-23 DIAGNOSIS — R93.1 AGATSTON CORONARY ARTERY CALCIUM SCORE LESS THAN 100: ICD-10-CM

## 2025-04-23 DIAGNOSIS — I10 PRIMARY HYPERTENSION: ICD-10-CM

## 2025-04-23 LAB
% GRANULOCYTES: 64.6 % (ref 42.2–75.2)
ALBUMIN SERPL BCG-MCNC: 4.2 G/DL (ref 3.5–5.2)
ALP SERPL-CCNC: 73 U/L (ref 40–150)
ALT SERPL W P-5'-P-CCNC: 38 U/L (ref 0–70)
ANION GAP SERPL CALCULATED.3IONS-SCNC: 11 MMOL/L (ref 7–15)
AST SERPL W P-5'-P-CCNC: 36 U/L (ref 0–45)
BILIRUB SERPL-MCNC: 0.7 MG/DL
BUN SERPL-MCNC: 30.1 MG/DL (ref 8–23)
CALCIUM SERPL-MCNC: 9.5 MG/DL (ref 8.8–10.4)
CHLORIDE SERPL-SCNC: 105 MMOL/L (ref 98–107)
CHOLESTEROL: 147 MG/DL (ref 100–199)
CREAT SERPL-MCNC: 1.13 MG/DL (ref 0.67–1.17)
EGFRCR SERPLBLD CKD-EPI 2021: 69 ML/MIN/1.73M2
FASTING STATUS PATIENT QL REPORTED: YES
FASTING?: YES
GLUCOSE SERPL-MCNC: 104 MG/DL (ref 70–99)
HCO3 SERPL-SCNC: 24 MMOL/L (ref 22–29)
HCT VFR BLD AUTO: 43.7 % (ref 39–51)
HDL (RMG): 74 MG/DL (ref 40–?)
HEMOGLOBIN: 14.9 G/DL (ref 13.4–17.5)
LDL CALCULATED (RMG): 57 MG/DL (ref 0–130)
LYMPHOCYTES NFR BLD AUTO: 26.7 % (ref 20.5–51.1)
MCH RBC QN AUTO: 29.1 PG (ref 27–31)
MCHC RBC AUTO-ENTMCNC: 34 G/DL (ref 33–37)
MCV RBC AUTO: 85.5 FL (ref 80–100)
MONOCYTES NFR BLD AUTO: 8.7 % (ref 1.7–9.3)
PLATELET # BLD AUTO: 164 K/UL (ref 140–450)
POTASSIUM SERPL-SCNC: 4.2 MMOL/L (ref 3.4–5.3)
PROT SERPL-MCNC: 7.8 G/DL (ref 6.4–8.3)
RBC # BLD AUTO: 5.11 X10/CMM (ref 4.2–5.9)
SODIUM SERPL-SCNC: 140 MMOL/L (ref 135–145)
TRIGLYCERIDES (RMG): 81 MG/DL (ref 0–149)
WBC # BLD AUTO: 6.6 X10/CMM (ref 3.8–11)

## 2025-04-23 PROCEDURE — 85025 COMPLETE CBC W/AUTO DIFF WBC: CPT | Performed by: FAMILY MEDICINE

## 2025-04-23 PROCEDURE — 99214 OFFICE O/P EST MOD 30 MIN: CPT | Mod: 25 | Performed by: FAMILY MEDICINE

## 2025-04-23 PROCEDURE — G0439 PPPS, SUBSEQ VISIT: HCPCS | Performed by: FAMILY MEDICINE

## 2025-04-23 PROCEDURE — 36415 COLL VENOUS BLD VENIPUNCTURE: CPT | Performed by: FAMILY MEDICINE

## 2025-04-23 PROCEDURE — 80053 COMPREHEN METABOLIC PANEL: CPT | Performed by: FAMILY MEDICINE

## 2025-04-23 PROCEDURE — 80061 LIPID PANEL: CPT | Mod: QW | Performed by: FAMILY MEDICINE

## 2025-04-23 PROCEDURE — G0103 PSA SCREENING: HCPCS | Mod: 90 | Performed by: FAMILY MEDICINE

## 2025-04-23 PROCEDURE — 99397 PER PM REEVAL EST PAT 65+ YR: CPT | Mod: 25 | Performed by: FAMILY MEDICINE

## 2025-04-23 PROCEDURE — 3080F DIAST BP >= 90 MM HG: CPT | Performed by: FAMILY MEDICINE

## 2025-04-23 PROCEDURE — 3077F SYST BP >= 140 MM HG: CPT | Performed by: FAMILY MEDICINE

## 2025-04-23 RX ORDER — IRBESARTAN 150 MG/1
150 TABLET ORAL AT BEDTIME
Qty: 90 TABLET | Refills: 3 | Status: SHIPPED | OUTPATIENT
Start: 2025-04-23

## 2025-04-23 SDOH — HEALTH STABILITY: PHYSICAL HEALTH: ON AVERAGE, HOW MANY DAYS PER WEEK DO YOU ENGAGE IN MODERATE TO STRENUOUS EXERCISE (LIKE A BRISK WALK)?: 6 DAYS

## 2025-04-23 ASSESSMENT — SOCIAL DETERMINANTS OF HEALTH (SDOH): HOW OFTEN DO YOU GET TOGETHER WITH FRIENDS OR RELATIVES?: MORE THAN THREE TIMES A WEEK

## 2025-04-23 NOTE — PATIENT INSTRUCTIONS
Patient Education   Preventive Care Advice   This is general advice given by our system to help you stay healthy. However, your care team may have specific advice just for you. Please talk to your care team about your preventive care needs.  Nutrition  Eat 5 or more servings of fruits and vegetables each day.  Try wheat bread, brown rice and whole grain pasta (instead of white bread, rice, and pasta).  Get enough calcium and vitamin D. Check the label on foods and aim for 100% of the RDA (recommended daily allowance).  Lifestyle  Exercise at least 150 minutes each week  (30 minutes a day, 5 days a week).  Do muscle strengthening activities 2 days a week. These help control your weight and prevent disease.  No smoking.  Wear sunscreen to prevent skin cancer.  Have a dental exam and cleaning every 6 months.  Yearly exams  See your health care team every year to talk about:  Any changes in your health.  Any medicines your care team has prescribed.  Preventive care, family planning, and ways to prevent chronic diseases.  Shots (vaccines)   HPV shots (up to age 26), if you've never had them before.  Hepatitis B shots (up to age 59), if you've never had them before.  COVID-19 shot: Get this shot when it's due.  Flu shot: Get a flu shot every year.  Tetanus shot: Get a tetanus shot every 10 years.  Pneumococcal, hepatitis A, and RSV shots: Ask your care team if you need these based on your risk.  Shingles shot (for age 50 and up)  General health tests  Diabetes screening:  Starting at age 35, Get screened for diabetes at least every 3 years.  If you are younger than age 35, ask your care team if you should be screened for diabetes.  Cholesterol test: At age 39, start having a cholesterol test every 5 years, or more often if advised.  Bone density scan (DEXA): At age 50, ask your care team if you should have this scan for osteoporosis (brittle bones).  Hepatitis C: Get tested at least once in your life.  STIs (sexually  transmitted infections)  Before age 24: Ask your care team if you should be screened for STIs.  After age 24: Get screened for STIs if you're at risk. You are at risk for STIs (including HIV) if:  You are sexually active with more than one person.  You don't use condoms every time.  You or a partner was diagnosed with a sexually transmitted infection.  If you are at risk for HIV, ask about PrEP medicine to prevent HIV.  Get tested for HIV at least once in your life, whether you are at risk for HIV or not.  Cancer screening tests  Cervical cancer screening: If you have a cervix, begin getting regular cervical cancer screening tests starting at age 21.  Breast cancer scan (mammogram): If you've ever had breasts, begin having regular mammograms starting at age 40. This is a scan to check for breast cancer.  Colon cancer screening: It is important to start screening for colon cancer at age 45.  Have a colonoscopy test every 10 years (or more often if you're at risk) Or, ask your provider about stool tests like a FIT test every year or Cologuard test every 3 years.  To learn more about your testing options, visit:   .  For help making a decision, visit:   https://bit.ly/fz64483.  Prostate cancer screening test: If you have a prostate, ask your care team if a prostate cancer screening test (PSA) at age 55 is right for you.  Lung cancer screening: If you are a current or former smoker ages 50 to 80, ask your care team if ongoing lung cancer screenings are right for you.  For informational purposes only. Not to replace the advice of your health care provider. Copyright   2023 [employer]. All rights reserved. Clinically reviewed by the [facility]itions [facility]. [employer] [id number] - REV 01/24.

## 2025-04-23 NOTE — PROGRESS NOTES
"Preventive Care Visit  [facility]  Zion Rachel MD, Family Medicine  Apr 23, 2025      Assessment & Plan     Encounter for Medicare annual wellness exam  - discussed preventative guidelines, healthy diet, exercise and weight management    Chronic heart failure with mildly reduced ejection fraction (HFmrEF, 41-49%) (H)  Appears euvolemic  Not on GDMT, recommend repeat echo to re-evaluate  asymptomatic  - VENOUS COLLECTION  - Echocardiogram Complete; Future    Coronary artery disease involving native coronary artery of native heart without angina pectoris  Asymptomatic, cont statin and asa  - Lipid Profile ([facility])  - Comprehensive metabolic panel; Future  - Comprehensive metabolic panel    Primary hypertension  Not at goal, increase irbesartan and follow up in one month  - Comprehensive metabolic panel; Future  - CBC with Diff/Plt ([facility])  - irbesartan (AVAPRO) 150 MG tablet; Take 1 tablet (150 mg) by mouth at bedtime.  - Comprehensive metabolic panel    Agatston coronary artery calcium score less than 100  Asymptomatic, cont statin    Prostate cancer screening  The natural history of prostate cancer and ongoing controversy regarding screening and potential treatment outcomes of prostate cancer has been discussed with the patient. The meaning of a false positive PSA and a false negative PSA has been discussed, as well as the concept of overdiagnosis.  He indicates understanding of the limitations of this screening test and wishes to proceed with screening PSA testing.   - Prostate Specific Antigen Screen; Future  - Prostate Specific Antigen Screen    Patient has been advised of split billing requirements and indicates understanding: Yes        BMI  Estimated body mass index is 31.22 kg/m  as calculated from the following:    Height as of this encounter: 1.753 m (5' 9\").    Weight as of this encounter: 95.9 kg (211 lb 6.4 oz).   Weight management plan: Discussed healthy diet and exercise " guidelines    Counseling  Appropriate preventive services were addressed with this patient via screening, questionnaire, or discussion as appropriate for fall prevention, nutrition, physical activity, Tobacco-use cessation, social engagement, weight loss and cognition.  Checklist reviewing preventive services available has been given to the patient.  Reviewed patient's diet, addressing concerns and/or questions.           Gage Pace is a 69 year old, presenting for the following:  Physical (Fasting. ) and Health Maintenance (Colonoscopy: Last 01/2022/Immunizations: covid, flu, tdap, zoster, )            HPI    CAD: calcium score <100 1/2022.  On Atorvastatin 20 and ASA. No chest pain, SOB.     Had sepsis 2019 unknown origin and resultant afib with RVR.  Resolved with CV and did not recur.  Monitor clear.  Completed 30 days eliquis.  Also UE DVT from IV.    HFmrEF: echo 2019 with EF 40-45% with mildly dilated LA.  Denies dyspnea, orthopnea, LE edema      Advance Care Planning            4/23/2025   General Health   How would you rate your overall physical health? Excellent   Feel stress (tense, anxious, or unable to sleep) Not at all         4/23/2025   Nutrition   Diet: I don't know         4/23/2025   Exercise   Days per week of moderate/strenous exercise 6 days         4/23/2025   Social Factors   Frequency of gathering with friends or relatives More than three times a week   Worry food won't last until get money to buy more No   Food not last or not have enough money for food? No   Do you have housing? (Housing is defined as stable permanent housing and does not include staying ouside in a car, in a tent, in an abandoned building, in an overnight shelter, or couch-surfing.) Yes   Are you worried about losing your housing? No   Lack of transportation? No   Unable to get utilities (heat,electricity)? No         4/23/2025   Fall Risk   Fallen 2 or more times in the past year? No   Trouble with walking or  balance? No          4/23/2025   Activities of Daily Living- Home Safety   Needs help with the following daily activites None of the above   Safety concerns in the home None of the above         4/23/2025   Dental   Dentist two times every year? Yes         4/23/2025   Hearing Screening   Hearing concerns? None of the above     Hearing Screen  Left ear:  500Hz - Pass  1000Hz - Pass  2000Hz - Pass  4000Hz - Fail    Right ear:  500Hz - Pass  1000Hz - Pass  2000Hz - Pass  4000Hz - Pass          4/23/2025   Driving Risk Screening   Patient/family members have concerns about driving No         4/23/2025   General Alertness/Fatigue Screening   Have you been more tired than usual lately? No         4/23/2025   Urinary Incontinence Screening   Bothered by leaking urine in past 6 months No         Today's PHQ-2 Score:       4/23/2025     8:06 AM   PHQ-2 ( 1999 [employer])   Q1: Little interest or pleasure in doing things 0   Q2: Feeling down, depressed or hopeless 0   PHQ-2 Score 0    Q1: Little interest or pleasure in doing things Not at all   Q2: Feeling down, depressed or hopeless Not at all   PHQ-2 Score 0       Patient-reported           4/23/2025   Substance Use   Alcohol more than 3/day or more than 7/wk No   Do you have a current opioid prescription? No   How severe/bad is pain from 1 to 10? 0/10 (No Pain)   Do you use any other substances recreationally? No     Social History     Tobacco Use    Smoking status: Never    Smokeless tobacco: Never   Substance Use Topics    Alcohol use: Not Currently     Alcohol/week: 3.3 - 5.0 standard drinks of alcohol     Types: 4 - 6 Standard drinks or equivalent per week           4/23/2025   AAA Screening   Family history of Abdominal Aortic Aneurysm (AAA)? No   ASCVD Risk   The 10-year ASCVD risk score (Kandi STEVENS, et al., 2019) is: 23.9%    Values used to calculate the score:      Age: 69 years      Sex: Male      Is Non- : No      Diabetic: No       "Tobacco smoker: No      Systolic Blood Pressure: 150 mmHg      Is BP treated: Yes      HDL Cholesterol: 66 mg/dL      Total Cholesterol: 170 mg/dL            Reviewed and updated as needed this visit by Provider   Tobacco  Allergies  Meds  Problems  Med Hx  Surg Hx  Fam Hx            Lab work is in process  Current providers sharing in care for this patient include:  Patient Care Team:  Zion Rachel MD as PCP - General (Family Medicine)  Zion Rachel MD as Assigned PCP    The following health maintenance items are reviewed in Epic and correct as of today:  Health Maintenance   Topic Date Due    ZOSTER IMMUNIZATION (1 of 2) Never done    RSV VACCINE (1 - Risk 60-74 years 1-dose series) Never done    DTAP/TDAP/TD IMMUNIZATION (3 - Td or Tdap) 05/30/2024    INFLUENZA VACCINE (1) Never done    COLORECTAL CANCER SCREENING  01/02/2025    COVID-19 Vaccine (8 - 2024-25 season) 03/20/2025    LIPID  04/09/2025    BMP  06/05/2025    MEDICARE ANNUAL WELLNESS VISIT  04/23/2026    FALL RISK ASSESSMENT  04/23/2026    DIABETES SCREENING  06/05/2027    ADVANCE CARE PLANNING  04/09/2029    HEPATITIS C SCREENING  Completed    PHQ-2 (once per calendar year)  Completed    Pneumococcal Vaccine: 50+ Years  Completed    HPV IMMUNIZATION  Aged Out    MENINGITIS IMMUNIZATION  Aged Out         Review of Systems  Constitutional, HEENT, cardiovascular, pulmonary, GI, , musculoskeletal, neuro, skin, endocrine and psych systems are negative, except as otherwise noted.     Objective    Exam  BP (!) 150/100   Pulse 69   Ht 1.753 m (5' 9\")   Wt 95.9 kg (211 lb 6.4 oz)   SpO2 100%   BMI 31.22 kg/m     Estimated body mass index is 31.22 kg/m  as calculated from the following:    Height as of this encounter: 1.753 m (5' 9\").    Weight as of this encounter: 95.9 kg (211 lb 6.4 oz).    Physical Exam  GENERAL: alert and no distress  EYES: Eyes grossly normal to inspection, PERRL and conjunctivae and sclerae normal  HENT: " ear canals and TM's normal, nose and mouth without ulcers or lesions  NECK: no adenopathy, no asymmetry, masses, or scars  RESP: lungs clear to auscultation - no rales, rhonchi or wheezes  CV: regular rate and rhythm, normal S1 S2, no S3 or S4, no murmur, click or rub, no peripheral edema  ABDOMEN: soft, nontender, no hepatosplenomegaly, no masses and bowel sounds normal  MS: no gross musculoskeletal defects noted, no edema  SKIN: no suspicious lesions or rashes  NEURO: Normal strength and tone, mentation intact and speech normal  PSYCH: mentation appears normal, affect normal/bright         4/23/2025   Mini Cog   Clock Draw Score 2 Normal   3 Item Recall 2 objects recalled   Mini Cog Total Score 4              Signed Electronically by: Zion Rachel MD

## 2025-04-24 LAB — PSA SERPL DL<=0.01 NG/ML-MCNC: 0.59 NG/ML (ref 0–6.5)

## 2025-05-14 ENCOUNTER — HOSPITAL ENCOUNTER (OUTPATIENT)
Dept: CARDIOLOGY | Facility: CLINIC | Age: 70
Discharge: HOME | End: 2025-05-14
Attending: FAMILY MEDICINE
Payer: COMMERCIAL

## 2025-05-14 DIAGNOSIS — I50.22 CHRONIC HEART FAILURE WITH MILDLY REDUCED EJECTION FRACTION (HFMREF, 41-49%) (H): ICD-10-CM

## 2025-05-14 LAB — LVEF ECHO: NORMAL

## 2025-05-14 PROCEDURE — 255N000002 HC RX 255 OP 636: Performed by: FAMILY MEDICINE

## 2025-05-14 PROCEDURE — C8929 TTE W OR WO FOL WCON,DOPPLER: HCPCS

## 2025-05-14 RX ADMIN — HUMAN ALBUMIN MICROSPHERES AND PERFLUTREN 3 ML: 10; .22 INJECTION, SOLUTION INTRAVENOUS at 12:47

## 2025-05-21 ENCOUNTER — RESULTS FOLLOW-UP (OUTPATIENT)
Dept: FAMILY MEDICINE | Facility: CLINIC | Age: 70
End: 2025-05-21

## 2025-05-21 DIAGNOSIS — I77.810 DILATATION OF THORACIC AORTA: Primary | ICD-10-CM

## 2025-05-22 DIAGNOSIS — I10 PRIMARY HYPERTENSION: ICD-10-CM

## 2025-05-27 RX ORDER — IRBESARTAN 75 MG/1
75 TABLET ORAL DAILY
Qty: 90 TABLET | Refills: 3 | OUTPATIENT
Start: 2025-05-27

## 2025-05-28 ENCOUNTER — OFFICE VISIT (OUTPATIENT)
Dept: FAMILY MEDICINE | Facility: CLINIC | Age: 70
End: 2025-05-28

## 2025-05-28 VITALS
HEART RATE: 67 BPM | SYSTOLIC BLOOD PRESSURE: 156 MMHG | WEIGHT: 211.8 LBS | DIASTOLIC BLOOD PRESSURE: 90 MMHG | BODY MASS INDEX: 31.28 KG/M2 | OXYGEN SATURATION: 96 %

## 2025-05-28 DIAGNOSIS — I77.810 ASCENDING AORTA DILATATION: ICD-10-CM

## 2025-05-28 DIAGNOSIS — I10 PRIMARY HYPERTENSION: Primary | ICD-10-CM

## 2025-05-28 DIAGNOSIS — I50.32 CHRONIC HEART FAILURE WITH PRESERVED EJECTION FRACTION (H): ICD-10-CM

## 2025-05-28 LAB
ANION GAP SERPL CALCULATED.3IONS-SCNC: 12 MMOL/L (ref 7–15)
BUN SERPL-MCNC: 26.5 MG/DL (ref 8–23)
CALCIUM SERPL-MCNC: 9.1 MG/DL (ref 8.8–10.4)
CHLORIDE SERPL-SCNC: 105 MMOL/L (ref 98–107)
CREAT SERPL-MCNC: 1.07 MG/DL (ref 0.67–1.17)
EGFRCR SERPLBLD CKD-EPI 2021: 74 ML/MIN/1.73M2
FASTING STATUS PATIENT QL REPORTED: NO
GLUCOSE SERPL-MCNC: 154 MG/DL (ref 70–99)
HCO3 SERPL-SCNC: 22 MMOL/L (ref 22–29)
POTASSIUM SERPL-SCNC: 4 MMOL/L (ref 3.4–5.3)
SODIUM SERPL-SCNC: 139 MMOL/L (ref 135–145)

## 2025-05-28 PROCEDURE — 80048 BASIC METABOLIC PNL TOTAL CA: CPT | Mod: 90 | Performed by: FAMILY MEDICINE

## 2025-05-28 PROCEDURE — G2211 COMPLEX E/M VISIT ADD ON: HCPCS | Performed by: FAMILY MEDICINE

## 2025-05-28 PROCEDURE — 3077F SYST BP >= 140 MM HG: CPT | Performed by: FAMILY MEDICINE

## 2025-05-28 PROCEDURE — 3080F DIAST BP >= 90 MM HG: CPT | Performed by: FAMILY MEDICINE

## 2025-05-28 PROCEDURE — 36415 COLL VENOUS BLD VENIPUNCTURE: CPT | Performed by: FAMILY MEDICINE

## 2025-05-28 PROCEDURE — 99214 OFFICE O/P EST MOD 30 MIN: CPT | Performed by: FAMILY MEDICINE

## 2025-05-28 RX ORDER — IRBESARTAN 300 MG/1
300 TABLET ORAL AT BEDTIME
COMMUNITY
Start: 2025-05-28

## 2025-05-28 NOTE — PROGRESS NOTES
Answers submitted by the patient for this visit:  Hypertension Visit (Submitted on 5/28/2025)  Chief Complaint: Chronic problems general questions HPI Form  Do you check your blood pressure regularly outside of the clinic?: No  Are your blood pressures ever more than 140 on the top number (systolic) OR more than 90 on the bottom number (diastolic)? (For example, greater than 140/90): Yes  Are you following a low salt diet?: Yes  General Questionnaire (Submitted on 5/28/2025)  Chief Complaint: Chronic problems general questions HPI Form  How many servings of fruits and vegetables do you eat daily?: 2-3  On average, how many sweetened beverages do you drink each day (Examples: soda, juice, sweet tea, etc.  Do NOT count diet or artificially sweetened beverages)?: 0  How many minutes a day do you exercise enough to make your heart beat faster?: 20 to 29  How many days a week do you exercise enough to make your heart beat faster?: 6  How many days per week do you miss taking your medication?: 0  Questionnaire about: Chronic problems general questions HPI Form (Submitted on 5/28/2025)  Chief Complaint: Chronic problems general questions HPI Form      Subjective     Sanjeev is a 69 year old patient who presents to clinic for follow up.    HTN: started on Irbesartan, tolerating well.  Home readings 140s/90s.  No side effects.    CAD: calcium score <100 1/2022.  On Atorvastatin 20 and ASA. No chest pain, SOB.     Had sepsis 2019 unknown origin and resultant afib with RVR.  Resolved with CV and did not recur.  Monitor clear.  Completed 30 days eliquis.  Also UE DVT from IV.     HFmrEF: echo 2019 with EF 40-45% with mildly dilated LA.  Recent repeat echo with EF 55-60% and grade 1 diastolic dysfunction.  Denies dyspnea, orthopnea, LE edema    Review of Systems   Constitutional, HEENT, cardiovascular, pulmonary, GI, , musculoskeletal, neuro, skin, endocrine and psych systems are negative, except as otherwise noted.       Objective    BP (!) 156/90   Pulse 67   Wt 96.1 kg (211 lb 12.8 oz)   SpO2 96%   BMI 31.28 kg/m      General: Well appearing, NAD    Psych: normal mood and affect        Primary hypertension  Not at goal, increase to 300 mg and recheck in one month  - irbesartan (AVAPRO) 300 MG tablet; Take 1 tablet (300 mg) by mouth at bedtime.  - Basic metabolic panel; Future  - Basic metabolic panel  - VENOUS COLLECTION    Ascending aorta dilatation  Mild on echo, recheck in one year  Optimize BP as above  - VENOUS COLLECTION    Chronic heart failure with preserved ejection fraction (H)  Appears euvolemic, EF improved  Risk factor optimization  - VENOUS COLLECTION    Follow up in 1 month

## 2025-06-17 DIAGNOSIS — I10 PRIMARY HYPERTENSION: ICD-10-CM

## 2025-06-17 DIAGNOSIS — L57.0 ACTINIC KERATOSIS: ICD-10-CM

## 2025-06-17 RX ORDER — FLUOROURACIL 50 MG/G
CREAM TOPICAL
Qty: 40 G | Refills: 0 | Status: SHIPPED | OUTPATIENT
Start: 2025-06-17

## 2025-06-17 RX ORDER — IRBESARTAN 300 MG/1
300 TABLET ORAL AT BEDTIME
Qty: 90 TABLET | Refills: 0 | Status: SHIPPED | OUTPATIENT
Start: 2025-06-17

## 2025-06-17 NOTE — TELEPHONE ENCOUNTER
Patient is calling and needs prescription.  Medication was changed at last appointment.    Irbesartan 300mg    Kerri

## 2025-06-17 NOTE — TELEPHONE ENCOUNTER
Med: Efudex    LOV (related): 4/23/2025    Due for F/U around: 4/23/2026 for AWV    Next Appt: 6/25/2025

## 2025-06-25 ENCOUNTER — OFFICE VISIT (OUTPATIENT)
Dept: FAMILY MEDICINE | Facility: CLINIC | Age: 70
End: 2025-06-25

## 2025-06-25 VITALS
WEIGHT: 207.6 LBS | HEART RATE: 64 BPM | BODY MASS INDEX: 30.66 KG/M2 | DIASTOLIC BLOOD PRESSURE: 80 MMHG | SYSTOLIC BLOOD PRESSURE: 124 MMHG | OXYGEN SATURATION: 97 %

## 2025-06-25 DIAGNOSIS — I10 PRIMARY HYPERTENSION: Primary | ICD-10-CM

## 2025-06-25 LAB
ANION GAP SERPL CALCULATED.3IONS-SCNC: 11 MMOL/L (ref 7–15)
BUN SERPL-MCNC: 35 MG/DL (ref 8–23)
CALCIUM SERPL-MCNC: 9.3 MG/DL (ref 8.8–10.4)
CHLORIDE SERPL-SCNC: 108 MMOL/L (ref 98–107)
CREAT SERPL-MCNC: 1.17 MG/DL (ref 0.67–1.17)
EGFRCR SERPLBLD CKD-EPI 2021: 67 ML/MIN/1.73M2
FASTING STATUS PATIENT QL REPORTED: NO
GLUCOSE SERPL-MCNC: 101 MG/DL (ref 70–99)
HCO3 SERPL-SCNC: 23 MMOL/L (ref 22–29)
POTASSIUM SERPL-SCNC: 4.6 MMOL/L (ref 3.4–5.3)
SODIUM SERPL-SCNC: 142 MMOL/L (ref 135–145)

## 2025-06-25 PROCEDURE — 3079F DIAST BP 80-89 MM HG: CPT | Performed by: FAMILY MEDICINE

## 2025-06-25 PROCEDURE — 99213 OFFICE O/P EST LOW 20 MIN: CPT | Performed by: FAMILY MEDICINE

## 2025-06-25 PROCEDURE — 3074F SYST BP LT 130 MM HG: CPT | Performed by: FAMILY MEDICINE

## 2025-06-25 PROCEDURE — 36415 COLL VENOUS BLD VENIPUNCTURE: CPT | Performed by: FAMILY MEDICINE

## 2025-06-25 PROCEDURE — G2211 COMPLEX E/M VISIT ADD ON: HCPCS | Performed by: FAMILY MEDICINE

## 2025-06-25 NOTE — PROGRESS NOTES
Gage Pace is a 69 year old patient who presents to clinic for follow up.    HTN: irbesartan increased to 300 mg last visit.  Not checking at home.  Tolerating well.        Review of Systems   Constitutional, HEENT, cardiovascular, pulmonary, GI, , musculoskeletal, neuro, skin, endocrine and psych systems are negative, except as otherwise noted.      Objective    /80   Pulse 64   Wt 94.2 kg (207 lb 9.6 oz)   SpO2 97%   BMI 30.66 kg/m      General: Well appearing, NAD  Psych: normal mood and affect        Primary hypertension  At goal on higher dose  Recheck BMP  Monitor at home  Weight loss  - Basic metabolic panel; Future  - Basic metabolic panel

## 2025-06-26 ENCOUNTER — RESULTS FOLLOW-UP (OUTPATIENT)
Dept: FAMILY MEDICINE | Facility: CLINIC | Age: 70
End: 2025-06-26

## (undated) RX ORDER — LIDOCAINE HYDROCHLORIDE 40 MG/ML
SOLUTION TOPICAL
Status: DISPENSED
Start: 2019-03-25

## (undated) RX ORDER — GLYCOPYRROLATE 0.2 MG/ML
INJECTION, SOLUTION INTRAMUSCULAR; INTRAVENOUS
Status: DISPENSED
Start: 2019-03-25

## (undated) RX ORDER — ALBUMIN, HUMAN INJ 5% 5 %
SOLUTION INTRAVENOUS
Status: DISPENSED
Start: 2019-03-22

## (undated) RX ORDER — FENTANYL CITRATE 50 UG/ML
INJECTION, SOLUTION INTRAMUSCULAR; INTRAVENOUS
Status: DISPENSED
Start: 2019-03-22

## (undated) RX ORDER — FENTANYL CITRATE 50 UG/ML
INJECTION, SOLUTION INTRAMUSCULAR; INTRAVENOUS
Status: DISPENSED
Start: 2019-03-25

## (undated) RX ORDER — NALOXONE HYDROCHLORIDE 0.4 MG/ML
INJECTION, SOLUTION INTRAMUSCULAR; INTRAVENOUS; SUBCUTANEOUS
Status: DISPENSED
Start: 2019-03-25

## (undated) RX ORDER — FLUMAZENIL 0.1 MG/ML
INJECTION, SOLUTION INTRAVENOUS
Status: DISPENSED
Start: 2019-03-25

## (undated) RX ORDER — POTASSIUM CHLORIDE 1500 MG/1
TABLET, EXTENDED RELEASE ORAL
Status: DISPENSED
Start: 2019-03-25